# Patient Record
Sex: FEMALE | Race: WHITE | NOT HISPANIC OR LATINO | Employment: FULL TIME | URBAN - METROPOLITAN AREA
[De-identification: names, ages, dates, MRNs, and addresses within clinical notes are randomized per-mention and may not be internally consistent; named-entity substitution may affect disease eponyms.]

---

## 2017-01-31 ENCOUNTER — ALLSCRIPTS OFFICE VISIT (OUTPATIENT)
Dept: OTHER | Facility: OTHER | Age: 42
End: 2017-01-31

## 2017-10-06 ENCOUNTER — GENERIC CONVERSION - ENCOUNTER (OUTPATIENT)
Dept: OTHER | Facility: OTHER | Age: 42
End: 2017-10-06

## 2017-11-08 ENCOUNTER — GENERIC CONVERSION - ENCOUNTER (OUTPATIENT)
Dept: OTHER | Facility: OTHER | Age: 42
End: 2017-11-08

## 2017-11-10 ENCOUNTER — APPOINTMENT (OUTPATIENT)
Dept: LAB | Facility: MEDICAL CENTER | Age: 42
End: 2017-11-10
Attending: FAMILY MEDICINE

## 2017-11-10 ENCOUNTER — TRANSCRIBE ORDERS (OUTPATIENT)
Dept: URGENT CARE | Facility: MEDICAL CENTER | Age: 42
End: 2017-11-10

## 2017-11-10 ENCOUNTER — APPOINTMENT (OUTPATIENT)
Dept: URGENT CARE | Facility: MEDICAL CENTER | Age: 42
End: 2017-11-10

## 2017-11-10 DIAGNOSIS — Z02.1 PHYSICAL EXAM, PRE-EMPLOYMENT: ICD-10-CM

## 2017-11-10 DIAGNOSIS — Z02.1 PHYSICAL EXAM, PRE-EMPLOYMENT: Primary | ICD-10-CM

## 2017-11-10 PROCEDURE — 86762 RUBELLA ANTIBODY: CPT

## 2017-11-10 PROCEDURE — 36415 COLL VENOUS BLD VENIPUNCTURE: CPT

## 2017-11-10 PROCEDURE — 86480 TB TEST CELL IMMUN MEASURE: CPT

## 2017-11-10 PROCEDURE — 86765 RUBEOLA ANTIBODY: CPT

## 2017-11-10 PROCEDURE — 86735 MUMPS ANTIBODY: CPT

## 2017-11-10 PROCEDURE — 86787 VARICELLA-ZOSTER ANTIBODY: CPT

## 2017-11-11 LAB — RUBV IGG SERPL IA-ACNC: 73.2 IU/ML

## 2017-11-12 LAB
ANNOTATION COMMENT IMP: NORMAL
GAMMA INTERFERON BACKGROUND BLD IA-ACNC: 0.07 IU/ML
M TB IFN-G BLD-IMP: NEGATIVE
M TB IFN-G CD4+ BCKGRND COR BLD-ACNC: 0.01 IU/ML
M TB IFN-G CD4+ T-CELLS BLD-ACNC: 0.08 IU/ML
MITOGEN IGNF BLD-ACNC: >10 IU/ML
QUANTIFERON-TB GOLD IN TUBE: NORMAL
SERVICE CMNT-IMP: NORMAL

## 2017-11-14 LAB
MEV IGG SER QL: NORMAL
MUV IGG SER QL: NORMAL
VZV IGG SER IA-ACNC: NORMAL

## 2018-01-10 NOTE — PROGRESS NOTES
Assessment   1  Encounter for preventive health examination (V70 0) (Z00 00)  2  Never a smoker    Plan  Health Maintenance    · Start: Melatonin 10 MG Oral Capsule; uses 1 at bedtime  Migraine headache    · (1) COMPREHENSIVE METABOLIC PANEL; Status:Active; Requested for:[id number];    · *1 - [facility] Physician Referral  consult re migraines  Status: Active  Requested  for: [id number]  () Care Summary provided  : Yes    Discussion/Summary  health maintenance visit cervical cancer screening is current Breast cancer screening: mammogram is current  She has been feeling well since last here  She is up-to-date with her immunizations although declines flu shot today - reconsider getting (rcvd TDaP 2010)    - she does get headaches as before- no change in pattern - waqas perimenstrual - uses Imitrex which helps  Discussed using trial Topamax or Elavil BP may not support using CaChBlkr/ BBlkr - can get on list to see Neurology  After discussion - will use as is  --She was seen at urgent care back in November with bronchitis, if this recurs I feel she should try an inhaler such as Pro Air with chest tightness  Try to avoid antibiotics    -- uses Xanax sparingly - discussed- try to avoid    -- see gyn as is Continue w exercise Watch healthy diet    do fasting BW    Check here at least yearly  Chief Complaint  PHYSICAL      History of Present Illness  HPI: She is in today for regular check, she relates overall she has been feeling well  She does continue with migraine headaches routinely especially perimenstrual with the same pattern as before  She does use Imitrex with relief, averages approximately 4 pills a month  Excedrin also gives mild relief  She continues to exercise routinely, tries to watch a healthy diet      Does use Xanax on occasion sparingly for acute anxiety      Review of Systems    Constitutional: no fever, not feeling poorly, no recent weight gain, no chills, not feeling tired and no recent weight loss  Eyes: no eyesight problems  ENT: no complaints of earache, no loss of hearing, no nose bleeds, no nasal discharge, no sore throat, no hoarseness  Cardiovascular: No complaints of slow heart rate, no fast heart rate, no chest pain, no palpitations, no leg claudication, no lower extremity edema  Respiratory: No complaints of shortness of breath, no wheezing, no cough, no SOB on exertion, no orthopnea, no PND  Gastrointestinal: No complaints of abdominal pain, no constipation, no nausea or vomiting, no diarrhea, no bloody stools  Genitourinary: sees gyn, but no dysuria  Musculoskeletal: No complaints of arthralgias, no myalgias, no joint swelling or stiffness, no limb pain or swelling  Integumentary: No complaints of skin rash or lesions, no itching, no skin wounds, no breast pain or lump  Psychiatric: Not suicidal, no sleep disturbance, no anxiety or depression, no change in personality, no emotional problems  Endocrine: No complaints of proptosis, no hot flashes, no muscle weakness, no deepening of the voice, no feelings of weakness  Hematologic/Lymphatic: No complaints of swollen glands, no swollen glands in the neck, does not bleed easily, does not bruise easily  Active Problems   1  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 (795 01) (R87 610)  2  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)  3  Migraine headache (346 90) (G43 909)  4   Situational anxiety (300 09) (F41 8)    Past Medical History    · History of hypercholesterolemia (V12 29) (Z86 39)    Surgical History    · History of Elbow Arthroplasty   · History of Primary Repair Of Ruptured Achilles Tendon    Family History  Mother    · Family history of Pure Hypercholesterolemia  Father    · Family history of Aortic Aneurysm   · Family history of Osteoarthritis (V17 7)  Paternal Grandfather    · Family history of Cancer   · Family history of Parkinson Disease    Social History    · Alcohol Use (History)   · socially beer and wine   · Full-time employment   · Never a smoker   ·  (V61 03) (Z63 5)    Current Meds  1  ALPRAZolam 0 25 MG Oral Tablet; TAKE 1/2 to 1 TABLET  2 or 3 times a day as need for   anxiety -try to avoid; Therapy: [id number] to (Evaluate:[id number]); Last Rx:10Aug2016 Ordered  2  SUMAtriptan Succinate 50 MG Oral Tablet; TAKE 1 TABLET FOR MIGRAINE RELIEF    MAY REPEAT ONCE  in 2 HOURS  Requested for: 13Sep2016; Last Rx:13Sep2016   Ordered    Allergies   1  No Known Drug Allergies    Vitals   Recorded: 95RIN0224 02:35PM   Heart Rate 68   Systolic 022   Diastolic 70   Height 5 ft 0 6 in   Weight 119 lb    BMI Calculated 22 78   BSA Calculated 1 51     Physical Exam    Constitutional   General appearance: No acute distress, well appearing and well nourished  Head and Face   Head and face: Normal     Eyes   Conjunctiva and lids: No swelling, erythema or discharge  Ears, Nose, Mouth, and Throat   Otoscopic examination: Tympanic membranes translucent with normal light reflex  Canals patent without erythema  Lips, teeth, and gums: Normal, good dentition  Oropharynx: Normal with no erythema, edema, exudate or lesions  Neck   Thyroid: Normal, no thyromegaly  Pulmonary   Auscultation of lungs: Clear to auscultation  Cardiovascular   Auscultation of heart: Normal rate and rhythm, normal S1 and S2, no murmurs  Examination of extremities for edema and/or varicosities: Normal     Lymphatic   Palpation of lymph nodes in neck: No lymphadenopathy  Neurologic   Cortical function: Normal mental status  Coordination: Normal finger to nose and heel to shin  Psychiatric   Judgment and insight: Normal     Orientation to person, place, and time: Normal     Recent and remote memory: Intact      Mood and affect: Normal        Signatures   Electronically signed by : Misty Kanner, DO; Dec 16 2016  4:42PM EST                       (Author)

## 2018-01-13 VITALS
TEMPERATURE: 98.6 F | SYSTOLIC BLOOD PRESSURE: 116 MMHG | BODY MASS INDEX: 22.7 KG/M2 | HEIGHT: 61 IN | WEIGHT: 120.25 LBS | DIASTOLIC BLOOD PRESSURE: 62 MMHG

## 2018-01-13 NOTE — RESULT NOTES
Verified Results  (1) COMPREHENSIVE METABOLIC PANEL 22TIM7575 70:71IJ Gina Machado Order Number: [id number]     Test Name Result Flag Reference   GLUCOSE,RANDM 90 mg/dL     If the patient is fasting, the ADA then defines impaired fasting glucose as > 100 mg/dL and diabetes as > or equal to 123 mg/dL  SODIUM 140 mmol/L  136-145   POTASSIUM 4 4 mmol/L  3 5-5 3   CHLORIDE 106 mmol/L  100-108   CARBON DIOXIDE 29 mmol/L  21-32   ANION GAP (CALC) 5 mmol/L  4-13   BLOOD UREA NITROGEN 11 mg/dL  5-25   CREATININE 0 85 mg/dL  0 60-1 30   Standardized to IDMS reference method   CALCIUM 8 9 mg/dL  8 3-10 1   BILI, TOTAL 0 56 mg/dL  0 20-1 00   ALK PHOSPHATAS 40 U/L L    ALT (SGPT) 16 U/L  12-78   AST(SGOT) 12 U/L  5-45   ALBUMIN 3 9 g/dL  3 5-5 0   TOTAL PROTEIN 7 5 g/dL  6 4-8 2   eGFR Non-African American      >60 0 ml/min/1 73sq [facility] Disease Education Program recommendations are as follows:  GFR calculation is accurate only with a steady state creatinine  Chronic Kidney disease less than 60 ml/min/1 73 sq  meters  Kidney failure less than 15 ml/min/1 73 sq  meters

## 2018-01-22 VITALS
RESPIRATION RATE: 12 BRPM | WEIGHT: 123.31 LBS | HEIGHT: 61 IN | BODY MASS INDEX: 23.28 KG/M2 | SYSTOLIC BLOOD PRESSURE: 111 MMHG | DIASTOLIC BLOOD PRESSURE: 64 MMHG | HEART RATE: 57 BPM

## 2018-02-02 ENCOUNTER — OFFICE VISIT (OUTPATIENT)
Dept: FAMILY MEDICINE CLINIC | Facility: CLINIC | Age: 43
End: 2018-02-02
Payer: COMMERCIAL

## 2018-02-02 VITALS
TEMPERATURE: 98.5 F | DIASTOLIC BLOOD PRESSURE: 74 MMHG | WEIGHT: 122.6 LBS | HEIGHT: 61 IN | SYSTOLIC BLOOD PRESSURE: 106 MMHG | HEART RATE: 74 BPM | BODY MASS INDEX: 23.15 KG/M2

## 2018-02-02 DIAGNOSIS — F41.9 ANXIETY: Primary | ICD-10-CM

## 2018-02-02 DIAGNOSIS — L72.3 SEBACEOUS CYST: ICD-10-CM

## 2018-02-02 PROCEDURE — 99213 OFFICE O/P EST LOW 20 MIN: CPT | Performed by: FAMILY MEDICINE

## 2018-02-02 RX ORDER — ALBUTEROL SULFATE 90 UG/1
1-2 AEROSOL, METERED RESPIRATORY (INHALATION)
COMMUNITY
Start: 2017-01-31 | End: 2018-02-02 | Stop reason: ALTCHOICE

## 2018-02-02 RX ORDER — ALPRAZOLAM 0.25 MG/1
TABLET ORAL
COMMUNITY
Start: 2014-07-11 | End: 2018-02-02 | Stop reason: SDUPTHER

## 2018-02-02 RX ORDER — ALPRAZOLAM 0.25 MG/1
TABLET ORAL
Qty: 20 TABLET | Refills: 0 | Status: SHIPPED | OUTPATIENT
Start: 2018-02-02 | End: 2018-09-14 | Stop reason: SDUPTHER

## 2018-02-02 RX ORDER — SUMATRIPTAN 50 MG/1
1 TABLET, FILM COATED ORAL
COMMUNITY
End: 2018-05-10 | Stop reason: SDUPTHER

## 2018-02-02 NOTE — PROGRESS NOTES
FAMILY PRACTICE OFFICE VISIT       NAME: Álvaro Noriega  AGE: 36 y o  SEX: female       : 1975        MRN: [id number]    DATE: 2018  TIME: 10:06 AM    Assessment and Plan     Problem List Items Addressed This Visit     None      Visit Diagnoses     Anxiety    -  Primary    Relevant Medications    ALPRAZolam (XANAX) 0 25 mg tablet    Sebaceous cyst                Patient Instructions    We discussed sebaceous cyst right suboccipital / posterior neck  With small size and minimal inflammation today I would not do incision and drainage, just observe, if this would enlarge with fluctuance we can do I and D, she also does see Dermatology on occasion, can discuss this with them     She uses Xanax very sparingly , 1/2 pill every few weeks, I did refill 20 pills for her, previously received 20 pills in September  Also - continue to see Neurology as is regarding headaches          Chief Complaint     Chief Complaint   Patient presents with    Cyst     back of the neck more inflamed in the last week or so        History of Present Illness   Roxanne Stoner is a 36y o -year-old female who  Has noted about 1 week of inflamed cyst sub acceptable on the right, she did express thick white material from it, recurred  Cyst has been present for quite some time but enlarged, now back down  Otherwise she has been feeling well, she is seeing Neurology regarding her headaches  She does use Xanax very sparingly, 1/2 pill every few weeks  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     Review of Systems   Review of Systems   Constitutional: Negative for appetite change, chills, diaphoresis, fatigue, fever and unexpected weight change  HENT: Negative for congestion, ear pain, hearing loss, mouth sores, postnasal drip, sinus pain, sinus pressure, sore throat and voice change  Eyes: Negative for pain and visual disturbance     Respiratory: Negative for apnea, cough, chest tightness, shortness of breath and wheezing (Had bronchitis last year, used rescue inhaler few times, no need since, no history of asthma)  Cardiovascular: Negative for chest pain, palpitations and leg swelling  Gastrointestinal: Negative for abdominal distention, abdominal pain, blood in stool, constipation, diarrhea, nausea and vomiting  Endocrine: Negative for polydipsia and polyuria  Genitourinary: Negative for difficulty urinating, dysuria and hematuria  Musculoskeletal: Negative for arthralgias, joint swelling and myalgias  Skin: Negative for rash and wound  Neurological: Negative for dizziness, tremors, seizures, syncope, speech difficulty, weakness, light-headedness and headaches (Sees Neurology)  Hematological: Negative for adenopathy  Does not bruise/bleed easily  Psychiatric/Behavioral: Negative for behavioral problems, confusion and sleep disturbance         Active Problem List     Patient Active Problem List   Diagnosis    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    HPV in female    Menstrual migraine    Migraine headache    Situational anxiety       Past Medical History:  Past Medical History:   Diagnosis Date    Hypercholesterolemia     200/75/119       Past Surgical History:  Past Surgical History:   Procedure Laterality Date    ACHILLES TENDON REPAIR Left     Repair of Ruptured achilles tendon left    ELBOW ARTHROPLASTY Left        Family History:  Family History   Problem Relation Age of Onset    Hyperlipidemia Mother      Pure hypocholesterolemia    Aortic aneurysm Father     Hypertension Father     Osteoarthritis Father     Cancer Paternal Grandfather     Parkinsonism Paternal Grandfather        Social History:  Social History     Social History    Marital status: /Civil Union     Spouse name: N/A    Number of children: 3    Years of education: N/A     Occupational History    Full-time employment      Social History Main Topics  Smoking status: Never Smoker    Smokeless tobacco: Not on file    Alcohol use Yes      Comment: socially beer and wine    Drug use: No    Sexual activity: Not on file     Other Topics Concern    Not on file     Social History Narrative    Caffeine use               Objective     Vitals:    02/02/18 0942   BP: 106/74   Pulse: 74   Temp: 98 5 °F (36 9 °C)   Weight: 55 6 kg (122 lb 9 6 oz)   Height: 5' 0 6" (1 539 m)     Wt Readings from Last 3 Encounters:   02/02/18 55 6 kg (122 lb 9 6 oz)   11/08/17 55 9 kg (123 lb 4 9 oz)   01/31/17 54 5 kg (120 lb 4 oz)       Physical Exam   Constitutional: She appears well-developed and well-nourished  Neck: Normal range of motion  Neck supple  No thyromegaly present  Has small cystic minimally tender mass right suboccipital, no fluctuance  No redness   Lymphadenopathy:     She has no cervical adenopathy  Pertinent Laboratory/Diagnostic Studies:          ALLERGIES:  No Known Allergies    Current Medications     Current Outpatient Prescriptions   Medication Sig Dispense Refill    ALPRAZolam (XANAX) 0 25 mg tablet Use 1/2 pill daily as needed, use sparingly 20 tablet 0    SUMAtriptan (IMITREX) 50 mg tablet Take 1 tablet by mouth       No current facility-administered medications for this visit            Health Maintenance       Edwina Dasilva DO

## 2018-05-10 DIAGNOSIS — G43.709 CHRONIC MIGRAINE WITHOUT AURA WITHOUT STATUS MIGRAINOSUS, NOT INTRACTABLE: Primary | ICD-10-CM

## 2018-05-11 RX ORDER — SUMATRIPTAN 50 MG/1
50 TABLET, FILM COATED ORAL ONCE AS NEEDED
Qty: 9 TABLET | Refills: 0 | Status: SHIPPED | OUTPATIENT
Start: 2018-05-11 | End: 2018-07-27 | Stop reason: SDUPTHER

## 2018-06-06 ENCOUNTER — OFFICE VISIT (OUTPATIENT)
Dept: FAMILY MEDICINE CLINIC | Facility: CLINIC | Age: 43
End: 2018-06-06
Payer: COMMERCIAL

## 2018-06-06 VITALS
WEIGHT: 121.5 LBS | HEIGHT: 61 IN | BODY MASS INDEX: 22.94 KG/M2 | TEMPERATURE: 98 F | OXYGEN SATURATION: 99 % | DIASTOLIC BLOOD PRESSURE: 70 MMHG | HEART RATE: 68 BPM | SYSTOLIC BLOOD PRESSURE: 98 MMHG

## 2018-06-06 DIAGNOSIS — J06.9 VIRAL UPPER RESPIRATORY TRACT INFECTION: Primary | ICD-10-CM

## 2018-06-06 PROCEDURE — 99213 OFFICE O/P EST LOW 20 MIN: CPT | Performed by: FAMILY MEDICINE

## 2018-06-06 RX ORDER — ALBUTEROL SULFATE 90 UG/1
2 AEROSOL, METERED RESPIRATORY (INHALATION) EVERY 6 HOURS PRN
Qty: 1 INHALER | Refills: 0 | Status: SHIPPED | OUTPATIENT
Start: 2018-06-06 | End: 2019-10-04 | Stop reason: SDUPTHER

## 2018-06-06 NOTE — PROGRESS NOTES
Assessment/Plan:     Diagnoses and all orders for this visit:    Viral upper respiratory tract infection  -     albuterol (PROVENTIL HFA,VENTOLIN HFA) 90 mcg/act inhaler; Inhale 2 puffs every 6 (six) hours as needed for wheezing          Subjective:      Patient ID: Jeovanny Pool is a 36 y o  female  Cold sx began 5 days ago  She had eye drainage right side now left  Also laryngitis  Cough and chest tightness at night  She had inhaler in past which helped      URI    This is a new problem  The current episode started in the past 7 days  There has been no fever  Associated symptoms include congestion, coughing and a sore throat  The following portions of the patient's history were reviewed and updated as appropriate: allergies, current medications, past family history, past medical history, past social history, past surgical history and problem list     Review of Systems   Constitutional: Positive for fatigue  Negative for fever  HENT: Positive for congestion and sore throat  Respiratory: Positive for cough  Objective:      BP 98/70   Pulse 68   Temp 98 °F (36 7 °C)   Ht 5' 0 6" (1 539 m)   Wt 55 1 kg (121 lb 8 oz)   SpO2 99%   BMI 23 26 kg/m²          Physical Exam   Constitutional: She appears well-developed and well-nourished  HENT:   Head: Normocephalic and atraumatic  Right Ear: External ear normal    Left Ear: External ear normal    Nose congested   Eyes: Conjunctivae are normal  Pupils are equal, round, and reactive to light  Mild conj injection right eye   Neck: Normal range of motion  Neck supple  No thyromegaly present  Cardiovascular: Normal rate, regular rhythm and normal heart sounds  Pulmonary/Chest: Effort normal and breath sounds normal    Lymphadenopathy:     She has no cervical adenopathy  Skin: Skin is warm and dry  Psychiatric: She has a normal mood and affect  Her behavior is normal    Nursing note and vitals reviewed

## 2018-06-06 NOTE — LETTER
June 6, 2018     Patient: Olga Lidia Pradhan   YOB: 1975   Date of Visit: 6/6/2018       To Whom it May Concern:    Nano Winters is under my professional care  She was seen in my office on 6/6/2018  She may return to work on 06/11/2018  Patient has been out of work since 6/4/18    If you have any questions or concerns, please don't hesitate to call           Sincerely,          Marielos Boss MD        CC: No Recipients

## 2018-06-09 ENCOUNTER — OFFICE VISIT (OUTPATIENT)
Dept: URGENT CARE | Age: 43
End: 2018-06-09
Payer: COMMERCIAL

## 2018-06-09 VITALS
SYSTOLIC BLOOD PRESSURE: 127 MMHG | HEIGHT: 62 IN | BODY MASS INDEX: 22.08 KG/M2 | RESPIRATION RATE: 16 BRPM | WEIGHT: 120 LBS | DIASTOLIC BLOOD PRESSURE: 76 MMHG | OXYGEN SATURATION: 100 % | HEART RATE: 56 BPM | TEMPERATURE: 97.5 F

## 2018-06-09 DIAGNOSIS — J01.90 ACUTE NON-RECURRENT SINUSITIS, UNSPECIFIED LOCATION: Primary | ICD-10-CM

## 2018-06-09 PROCEDURE — 99213 OFFICE O/P EST LOW 20 MIN: CPT | Performed by: PHYSICIAN ASSISTANT

## 2018-06-09 PROCEDURE — S9088 SERVICES PROVIDED IN URGENT: HCPCS | Performed by: PHYSICIAN ASSISTANT

## 2018-06-09 RX ORDER — BENZONATATE 100 MG/1
100 CAPSULE ORAL 3 TIMES DAILY PRN
Qty: 15 CAPSULE | Refills: 0 | Status: SHIPPED | OUTPATIENT
Start: 2018-06-09 | End: 2018-09-14 | Stop reason: ALTCHOICE

## 2018-06-09 RX ORDER — LORATADINE 10 MG/1
10 TABLET ORAL DAILY
Qty: 30 TABLET | Refills: 0 | Status: SHIPPED | OUTPATIENT
Start: 2018-06-09 | End: 2018-12-28

## 2018-06-09 RX ORDER — AMOXICILLIN AND CLAVULANATE POTASSIUM 875; 125 MG/1; MG/1
1 TABLET, FILM COATED ORAL EVERY 12 HOURS SCHEDULED
Qty: 14 TABLET | Refills: 0 | Status: SHIPPED | OUTPATIENT
Start: 2018-06-09 | End: 2018-06-16

## 2018-06-09 RX ORDER — FLUTICASONE PROPIONATE 50 MCG
2 SPRAY, SUSPENSION (ML) NASAL DAILY
Qty: 16 G | Refills: 0 | Status: SHIPPED | OUTPATIENT
Start: 2018-06-09 | End: 2019-03-01 | Stop reason: SDUPTHER

## 2018-06-09 NOTE — PATIENT INSTRUCTIONS
Follow up with PCP in 3-5 days  Proceed to  ER if symptoms worsen  Sinusitis   AMBULATORY CARE:   Sinusitis  is inflammation or infection of your sinuses  It is most often caused by a virus  Acute sinusitis may last up to 12 weeks  Chronic sinusitis lasts longer than 12 weeks  Recurrent sinusitis means you have 4 or more times in 1 year  Common symptoms include the following:   · Fever    · Pain, pressure, redness, or swelling around the forehead, cheeks, or eyes    · Thick yellow or green discharge from your nose    · Tenderness when you touch your face over your sinuses    · Dry cough that happens mostly at night or when you lie down    · Headache and face pain that is worse when you lean forward    · Tooth pain, or pain when you chew  Seek care immediately if:   · Your eye and eyelid are red, swollen, and painful  · You cannot open your eye  · You have vision changes, such as double vision  · Your eyeball bulges out or you cannot move your eye  · You are more sleepy than normal, or you notice changes in your ability to think, move, or talk  · You have a stiff neck, a fever, or a bad headache  · You have swelling of your forehead or scalp  Contact your healthcare provider if:   · Your symptoms do not improve after 3 days  · Your symptoms do not go away after 10 days  · You have nausea and are vomiting  · Your nose is bleeding  · You have questions or concerns about your condition or care  Treatment for sinusitis:  Your symptoms may go away on their own  Your healthcare provider may recommend watchful waiting for up to 10 days before starting antibiotics  You may  need any of the following:  · Acetaminophen  decreases pain and fever  It is available without a doctor's order  Ask how much to take and how often to take it  Follow directions   Read the labels of all other medicines you are using to see if they also contain acetaminophen, or ask your doctor or pharmacist  Acetaminophen can cause liver damage if not taken correctly  Do not use more than 4 grams (4,000 milligrams) total of acetaminophen in one day  · NSAIDs , such as ibuprofen, help decrease swelling, pain, and fever  This medicine is available with or without a doctor's order  NSAIDs can cause stomach bleeding or kidney problems in certain people  If you take blood thinner medicine, always ask your healthcare provider if NSAIDs are safe for you  Always read the medicine label and follow directions  · Nasal steroid sprays  may help decrease inflammation in your nose and sinuses  · Decongestants  help reduce swelling and drain mucus in the nose and sinuses  They may help you breathe easier  · Antihistamines  help dry mucus in the nose and relieve sneezing  · Antibiotics  help treat or prevent a bacterial infection  · Take your medicine as directed  Contact your healthcare provider if you think your medicine is not helping or if you have side effects  Tell him or her if you are allergic to any medicine  Keep a list of the medicines, vitamins, and herbs you take  Include the amounts, and when and why you take them  Bring the list or the pill bottles to follow-up visits  Carry your medicine list with you in case of an emergency  Self-care:   · Rinse your sinuses  Use a sinus rinse device to rinse your nasal passages with a saline (salt water) solution or distilled water  Do not use tap water  This will help thin the mucus in your nose and rinse away pollen and dirt  It will also help reduce swelling so you can breathe normally  Ask your healthcare provider how often to do this  · Breathe in steam   Heat a bowl of water until you see steam  Lean over the bowl and make a tent over your head with a large towel  Breathe deeply for about 20 minutes  Be careful not to get too close to the steam or burn yourself  Do this 3 times a day  You can also breathe deeply when you take a hot shower       · Sleep with your head elevated  Place an extra pillow under your head before you go to sleep to help your sinuses drain  · Drink liquids as directed  Ask your healthcare provider how much liquid to drink each day and which liquids are best for you  Liquids will thin the mucus in your nose and help it drain  Avoid drinks that contain alcohol or caffeine  · Do not smoke, and avoid secondhand smoke  Nicotine and other chemicals in cigarettes and cigars can make your symptoms worse  Ask your healthcare provider for information if you currently smoke and need help to quit  E-cigarettes or smokeless tobacco still contain nicotine  Talk to your healthcare provider before you use these products  Prevent the spread of germs that cause sinusitis:  Wash your hands often with soap and water  Wash your hands after you use the bathroom, change a child's diaper, or sneeze  Wash your hands before you prepare or eat food  Follow up with your healthcare provider as directed: You may be referred to an ear, nose, and throat specialist  Write down your questions so you remember to ask them during your visits  © 2017 [facility] Information is for End User's use only and may not be sold, redistributed or otherwise used for commercial purposes  All illustrations and images included in CareNotes® are the copyrighted property of A D [employer]  or [employer]  The above information is an  only  It is not intended as medical advice for individual conditions or treatments  Talk to your doctor, nurse or pharmacist before following any medical regimen to see if it is safe and effective for you

## 2018-06-09 NOTE — PROGRESS NOTES
[address]        NAME: Moshe Jones is a 36 y o  female  : 1975    MRN: [id number]  DATE: 2018  TIME: 10:41 AM    Assessment and Plan   Acute non-recurrent sinusitis, unspecified location [J01 90]  1  Acute non-recurrent sinusitis, unspecified location  amoxicillin-clavulanate (AUGMENTIN) 875-125 mg per tablet    fluticasone (FLONASE) 50 mcg/act nasal spray    benzonatate (TESSALON PERLES) 100 mg capsule    loratadine (CLARITIN) 10 mg tablet         Patient Instructions       Follow up with PCP in 3-5 days  Proceed to  ER if symptoms worsen  Chief Complaint     Chief Complaint   Patient presents with    Facial Pain     for 8 day with cough and chest congestion  would like something for cough; keeping her awake         History of Present Illness       For the past 8 days sinus congestion, cough, chest congestion  Been using OTC medications without the relief      URI    This is a new problem  The current episode started 1 to 4 weeks ago  The problem has been waxing and waning  There has been no fever  Associated symptoms include congestion, coughing, rhinorrhea, sinus pain and a sore throat  Pertinent negatives include no abdominal pain, chest pain, diarrhea, dysuria, ear pain, joint pain, joint swelling, neck pain, swollen glands, vomiting or wheezing  She has tried nothing for the symptoms  Review of Systems   Review of Systems   Constitutional: Negative  HENT: Positive for congestion, rhinorrhea, sinus pain and sore throat  Negative for ear pain  Eyes: Negative  Respiratory: Positive for cough  Negative for wheezing  Cardiovascular: Negative  Negative for chest pain  Gastrointestinal: Negative  Negative for abdominal pain, diarrhea and vomiting  Genitourinary: Negative for dysuria  Musculoskeletal: Negative  Negative for joint pain and neck pain           Current Medications       Current Outpatient Prescriptions:     albuterol (PROVENTIL HFA,VENTOLIN HFA) 90 mcg/act inhaler, Inhale 2 puffs every 6 (six) hours as needed for wheezing, Disp: 1 Inhaler, Rfl: 0    ALPRAZolam (XANAX) 0 25 mg tablet, Use 1/2 pill daily as needed, use sparingly, Disp: 20 tablet, Rfl: 0    SUMAtriptan (IMITREX) 50 mg tablet, Take 1 tablet (50 mg total) by mouth once as needed for migraine for up to 1 dose, Disp: 9 tablet, Rfl: 0    amoxicillin-clavulanate (AUGMENTIN) 875-125 mg per tablet, Take 1 tablet by mouth every 12 (twelve) hours for 7 days, Disp: 14 tablet, Rfl: 0    benzonatate (TESSALON PERLES) 100 mg capsule, Take 1 capsule (100 mg total) by mouth 3 (three) times a day as needed for cough, Disp: 15 capsule, Rfl: 0    fluticasone (FLONASE) 50 mcg/act nasal spray, 2 sprays into each nostril daily, Disp: 16 g, Rfl: 0    loratadine (CLARITIN) 10 mg tablet, Take 1 tablet (10 mg total) by mouth daily, Disp: 30 tablet, Rfl: 0    Current Allergies     Allergies as of 06/09/2018    (No Known Allergies)            The following portions of the patient's history were reviewed and updated as appropriate: allergies, current medications, past family history, past medical history, past social history, past surgical history and problem list      Past Medical History:   Diagnosis Date    Hypercholesterolemia     200/75/119       Past Surgical History:   Procedure Laterality Date    ACHILLES TENDON REPAIR Left     Repair of Ruptured achilles tendon left    ELBOW ARTHROPLASTY Left        Family History   Problem Relation Age of Onset    Hyperlipidemia Mother      Pure hypocholesterolemia    Aortic aneurysm Father     Hypertension Father     Osteoarthritis Father     Cancer Paternal Grandfather     Parkinsonism Paternal Grandfather          Medications have been verified          Objective   /76   Pulse 56   Temp 97 5 °F (36 4 °C)   Resp 16   Ht 5' 2" (1 575 m)   Wt 54 4 kg (120 lb)   SpO2 100%   BMI 21 95 kg/m²        Physical Exam     Physical Exam Constitutional: She is oriented to person, place, and time  She appears well-developed and well-nourished  No distress  HENT:   Head: Normocephalic and atraumatic  Right Ear: External ear normal    Left Ear: External ear normal    Nose: Right sinus exhibits maxillary sinus tenderness and frontal sinus tenderness  Left sinus exhibits maxillary sinus tenderness and frontal sinus tenderness  Mouth/Throat: Oropharynx is clear and moist  No oropharyngeal exudate  Eyes: Conjunctivae are normal    Neck: Normal range of motion  Neck supple  Cardiovascular: Normal rate, regular rhythm, normal heart sounds and intact distal pulses  No murmur heard  Pulmonary/Chest: Effort normal and breath sounds normal  No respiratory distress  She has no decreased breath sounds  She has no wheezes  She has no rhonchi  She has no rales  Abdominal: Soft  Bowel sounds are normal  There is no tenderness  Musculoskeletal: Normal range of motion  Lymphadenopathy:     She has no cervical adenopathy  Neurological: She is alert and oriented to person, place, and time  Skin: Skin is warm and dry  Psychiatric: She has a normal mood and affect  Nursing note and vitals reviewed

## 2018-06-12 ENCOUNTER — TELEPHONE (OUTPATIENT)
Dept: FAMILY MEDICINE CLINIC | Facility: CLINIC | Age: 43
End: 2018-06-12

## 2018-06-12 DIAGNOSIS — R05.9 COUGH: Primary | ICD-10-CM

## 2018-06-12 RX ORDER — BENZONATATE 100 MG/1
100 CAPSULE ORAL 3 TIMES DAILY PRN
Qty: 30 CAPSULE | Refills: 0 | Status: SHIPPED | OUTPATIENT
Start: 2018-06-12 | End: 2018-09-14 | Stop reason: ALTCHOICE

## 2018-06-12 NOTE — TELEPHONE ENCOUNTER
Pt called stating that she went to urgent care over the weekend because her URI was not getting any better  Pt was given tessalon perles and will be going out of town this week  Pt would like to know if we can send a refill for her to last her through the weekend

## 2018-07-27 DIAGNOSIS — G43.709 CHRONIC MIGRAINE WITHOUT AURA WITHOUT STATUS MIGRAINOSUS, NOT INTRACTABLE: ICD-10-CM

## 2018-07-27 RX ORDER — SUMATRIPTAN 50 MG/1
50 TABLET, FILM COATED ORAL ONCE AS NEEDED
Qty: 9 TABLET | Refills: 0 | Status: SHIPPED | OUTPATIENT
Start: 2018-07-27 | End: 2018-09-14 | Stop reason: SDUPTHER

## 2018-08-03 ENCOUNTER — APPOINTMENT (OUTPATIENT)
Dept: LAB | Facility: CLINIC | Age: 43
End: 2018-08-03

## 2018-08-03 ENCOUNTER — TRANSCRIBE ORDERS (OUTPATIENT)
Dept: LAB | Facility: CLINIC | Age: 43
End: 2018-08-03

## 2018-08-03 DIAGNOSIS — Z00.8 HEALTH EXAMINATION IN POPULATION SURVEY: ICD-10-CM

## 2018-08-03 DIAGNOSIS — Z00.8 HEALTH EXAMINATION IN POPULATION SURVEY: Primary | ICD-10-CM

## 2018-08-03 LAB
CHOLEST SERPL-MCNC: 179 MG/DL (ref 50–200)
EST. AVERAGE GLUCOSE BLD GHB EST-MCNC: 114 MG/DL
HBA1C MFR BLD: 5.6 % (ref 4.2–6.3)
HDLC SERPL-MCNC: 87 MG/DL (ref 40–60)
LDLC SERPL CALC-MCNC: 87 MG/DL (ref 0–100)
NONHDLC SERPL-MCNC: 92 MG/DL
TRIGL SERPL-MCNC: 27 MG/DL

## 2018-08-03 PROCEDURE — 80061 LIPID PANEL: CPT

## 2018-08-03 PROCEDURE — 83036 HEMOGLOBIN GLYCOSYLATED A1C: CPT

## 2018-08-03 PROCEDURE — 36415 COLL VENOUS BLD VENIPUNCTURE: CPT

## 2018-09-13 PROCEDURE — 88305 TISSUE EXAM BY PATHOLOGIST: CPT | Performed by: PATHOLOGY

## 2018-09-14 ENCOUNTER — OFFICE VISIT (OUTPATIENT)
Dept: INTERNAL MEDICINE CLINIC | Facility: CLINIC | Age: 43
End: 2018-09-14
Payer: COMMERCIAL

## 2018-09-14 VITALS
DIASTOLIC BLOOD PRESSURE: 64 MMHG | OXYGEN SATURATION: 98 % | RESPIRATION RATE: 18 BRPM | WEIGHT: 120.9 LBS | SYSTOLIC BLOOD PRESSURE: 110 MMHG | HEIGHT: 62 IN | HEART RATE: 57 BPM | BODY MASS INDEX: 22.25 KG/M2 | TEMPERATURE: 97.9 F

## 2018-09-14 DIAGNOSIS — Z00.00 HEALTH MAINTENANCE EXAMINATION: ICD-10-CM

## 2018-09-14 DIAGNOSIS — J30.89 ALLERGIC RHINITIS DUE TO OTHER ALLERGIC TRIGGER, UNSPECIFIED SEASONALITY: Primary | ICD-10-CM

## 2018-09-14 DIAGNOSIS — Z12.31 ENCOUNTER FOR SCREENING MAMMOGRAM FOR BREAST CANCER: ICD-10-CM

## 2018-09-14 DIAGNOSIS — R73.03 PREDIABETES: ICD-10-CM

## 2018-09-14 DIAGNOSIS — G43.709 CHRONIC MIGRAINE WITHOUT AURA WITHOUT STATUS MIGRAINOSUS, NOT INTRACTABLE: ICD-10-CM

## 2018-09-14 DIAGNOSIS — F41.9 ANXIETY: ICD-10-CM

## 2018-09-14 DIAGNOSIS — Z71.9 HEALTH COUNSELING: ICD-10-CM

## 2018-09-14 DIAGNOSIS — F41.8 SITUATIONAL ANXIETY: ICD-10-CM

## 2018-09-14 PROBLEM — J30.9 ALLERGIC RHINITIS: Status: ACTIVE | Noted: 2018-09-14

## 2018-09-14 PROCEDURE — 1036F TOBACCO NON-USER: CPT | Performed by: INTERNAL MEDICINE

## 2018-09-14 PROCEDURE — 3008F BODY MASS INDEX DOCD: CPT | Performed by: INTERNAL MEDICINE

## 2018-09-14 PROCEDURE — 99214 OFFICE O/P EST MOD 30 MIN: CPT | Performed by: INTERNAL MEDICINE

## 2018-09-14 RX ORDER — SUMATRIPTAN 50 MG/1
TABLET, FILM COATED ORAL
Qty: 9 TABLET | Refills: 1 | Status: SHIPPED | OUTPATIENT
Start: 2018-09-14 | End: 2019-03-01 | Stop reason: SDUPTHER

## 2018-09-14 RX ORDER — ALPRAZOLAM 0.25 MG/1
TABLET ORAL
Qty: 20 TABLET | Refills: 0 | Status: SHIPPED | OUTPATIENT
Start: 2018-09-14 | End: 2019-05-31 | Stop reason: SDUPTHER

## 2018-09-14 NOTE — PROGRESS NOTES
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  PAPs due, refer to gynecology  Situational anxiety  Takes alprazolam rarely, as needed  PDMP reviewed  Prediabetes  Discussed low carb diet, limit junk food, sweet  Diagnoses and all orders for this visit:    Allergic rhinitis due to other allergic trigger, unspecified seasonality    Chronic migraine without aura without status migrainosus, not intractable  -     SUMAtriptan (IMITREX) 50 mg tablet; Take 1/2 to 1 tablet as needed for headache    Anxiety  -     ALPRAZolam (XANAX) 0 25 mg tablet; Use 1/2 pill daily as needed, use sparingly    Health maintenance examination  -     Ambulatory referral to Obstetrics / Gynecology; Future    Prediabetes    Situational anxiety    Health counseling  Comments:  Mammogram and PAPs due  Flu vaccine at work  Encounter for screening mammogram for breast cancer  -     Mammo screening bilateral w cad; Future      Follow up in 1 year or as needed  Subjective:      Patient ID: Cathleen Barraza is a 36 y o  female  Moris Kajla complains of frequent sinus pressure  She reports that symptoms worse recently, complains of sinus pain and pressure occasionally  She denies any postnasal drip, sore throat, cough or wheezing  She has Flonase which she does not use regularly  She is also worried about her health, took about a month for her to recover from a cold  She was given a steroid inhaler to use then  She suffers from migraine headaches, saw Neurology  She takes Imitrex as needed  She would take this about 4 to 8 times a month, 25 mg at a time    She does have a mouth guard which she does not use at night, she clenches her jaw frequently  She reports that if she does not eat or sleep well, this can trigger a headache  Migraine headaches would occur about 3 to 4 times a month  The following portions of the patient's history were reviewed and updated as appropriate: allergies, current medications, past family history, past medical history, past social history, past surgical history and problem list     Review of Systems   Constitutional: Negative for appetite change and fatigue  HENT: Positive for congestion, postnasal drip, rhinorrhea and sinus pressure  Negative for ear pain  Eyes: Negative for visual disturbance  Respiratory: Negative for cough and shortness of breath  Cardiovascular: Negative for chest pain and leg swelling  Gastrointestinal: Negative for abdominal pain, constipation and diarrhea  Genitourinary: Negative for dysuria, frequency and urgency  Musculoskeletal: Negative for arthralgias and myalgias  Skin: Negative for rash and wound  Neurological: Negative for dizziness, numbness and headaches  Hematological: Does not bruise/bleed easily  Psychiatric/Behavioral: Negative for confusion  The patient is not nervous/anxious  Objective:      /64   Pulse 57   Temp 97 9 °F (36 6 °C) (Oral)   Resp 18   Ht 5' 1 5" (1 562 m)   Wt 54 8 kg (120 lb 14 4 oz)   SpO2 98%   BMI 22 47 kg/m²          Physical Exam   Constitutional: She is oriented to person, place, and time  She appears well-developed and well-nourished  HENT:   Head: Normocephalic and atraumatic  Right Ear: Tympanic membrane, external ear and ear canal normal  No decreased hearing is noted  Left Ear: Tympanic membrane, external ear and ear canal normal  No decreased hearing is noted  Nose: Rhinorrhea present  No mucosal edema  Right sinus exhibits no maxillary sinus tenderness and no frontal sinus tenderness   Left sinus exhibits no maxillary sinus tenderness and no frontal sinus tenderness  Mouth/Throat: Oropharynx is clear and moist and mucous membranes are normal    Eyes: Conjunctivae are normal  Pupils are equal, round, and reactive to light  Neck: Neck supple  Cardiovascular: Normal rate, regular rhythm and normal heart sounds  No edema  Pulmonary/Chest: Effort normal and breath sounds normal  She has no wheezes  She has no rales  Abdominal: Soft  Bowel sounds are normal    Neurological: She is alert and oriented to person, place, and time  Skin: Skin is warm  No rash noted  Psychiatric: She has a normal mood and affect  Her behavior is normal    Nursing note and vitals reviewed  Reviewed available records

## 2018-09-14 NOTE — ASSESSMENT & PLAN NOTE
Takes Imitrex as needed  If sinus symptoms do not improve and headaches continue, recommend to keep appointment with neurology

## 2018-09-14 NOTE — PATIENT INSTRUCTIONS
Use steroid nasal spray every morning, saline spray as needed  Start saline rinse at least every other day  Limit use of decongestant  You can start taking magnesium and B2 supplements to help with migraine headaches

## 2018-09-14 NOTE — ASSESSMENT & PLAN NOTE
Start steroid nasal spray every morning, use saline spray as needed  Can start saline rinse at least every other day

## 2018-09-17 ENCOUNTER — LAB REQUISITION (OUTPATIENT)
Dept: LAB | Facility: HOSPITAL | Age: 43
End: 2018-09-17
Payer: COMMERCIAL

## 2018-09-17 DIAGNOSIS — D48.5 NEOPLASM OF UNCERTAIN BEHAVIOR OF SKIN: ICD-10-CM

## 2018-10-18 ENCOUNTER — TELEPHONE (OUTPATIENT)
Dept: NEUROLOGY | Facility: CLINIC | Age: 43
End: 2018-10-18

## 2018-12-28 ENCOUNTER — OFFICE VISIT (OUTPATIENT)
Dept: OBGYN CLINIC | Facility: CLINIC | Age: 43
End: 2018-12-28
Payer: COMMERCIAL

## 2018-12-28 VITALS
BODY MASS INDEX: 22.77 KG/M2 | WEIGHT: 120.6 LBS | DIASTOLIC BLOOD PRESSURE: 64 MMHG | HEIGHT: 61 IN | SYSTOLIC BLOOD PRESSURE: 108 MMHG

## 2018-12-28 DIAGNOSIS — Z12.4 CERVICAL CANCER SCREENING: ICD-10-CM

## 2018-12-28 DIAGNOSIS — Z01.419 WELL WOMAN EXAM WITH ROUTINE GYNECOLOGICAL EXAM: Primary | ICD-10-CM

## 2018-12-28 DIAGNOSIS — Z11.51 SCREENING FOR HPV (HUMAN PAPILLOMAVIRUS): ICD-10-CM

## 2018-12-28 DIAGNOSIS — Z00.00 HEALTH MAINTENANCE EXAMINATION: ICD-10-CM

## 2018-12-28 PROCEDURE — 87624 HPV HI-RISK TYP POOLED RSLT: CPT | Performed by: OBSTETRICS & GYNECOLOGY

## 2018-12-28 PROCEDURE — 99386 PREV VISIT NEW AGE 40-64: CPT | Performed by: OBSTETRICS & GYNECOLOGY

## 2018-12-28 PROCEDURE — G0145 SCR C/V CYTO,THINLAYER,RESCR: HCPCS | Performed by: OBSTETRICS & GYNECOLOGY

## 2018-12-28 NOTE — PROGRESS NOTES
ASSESSMENT & PLAN: Barak Bocanegra is a 39 y o  [id number] with normal gynecologic exam     1   Routine well woman exam done today  2   Pap and HPV:Pap with HPV was done today  Current ASCCP Guidelines reviewed  3   Mammogram ordered  Recommend yearly mammography  4   The patient declined STD testing  No testing performed  Safe sex practices have been discussed  5  The patient is sexually active  She relies on her partners vasectomy contraception and options have been discussed  6  The following were reviewed in today's visit: breast self exam, mammography screening ordered, exercise, healthy diet and HPV  7  Patient to return to office in 12 months for annual exam      All questions have been answered to her satisfaction  CC:  Annual Gynecologic Examination    HPI: Barak Bocanegra is a 39 y o  [id number] who presents for annual gynecologic examination  She has the following concerns:  No concerns    Health Maintenance:    She exercises 3 days per week  She wears her seatbelt routinely  She does not perform regular monthly self breast exams  She feels safe at home  Patients does try to follow a balanced diet  Past Medical History:   Diagnosis Date    Allergic     Anxiety     Hypercholesterolemia     200/75/119       Past Surgical History:   Procedure Laterality Date    ACHILLES TENDON REPAIR Left     Repair of Ruptured achilles tendon left    ACHILLES TENDON REPAIR Left     teen    ELBOW ARTHROPLASTY Left     ELBOW SURGERY      teen       Past OB/Gyn History:  Period Cycle (Days): 28  Period Duration (Days): 4-5  Period Pattern: Regular  Menstrual Flow: Moderate  Dysmenorrhea: NonePatient's last menstrual period was 2018 (exact date)  Menstrual History:  OB History      Para Term  AB Living    3 3 3     3    SAB TAB Ectopic Multiple Live Births            3           Patient's last menstrual period was 2018 (exact date)    Period Cycle (Days): 28  Period Duration (Days): 4-5  Period Pattern: Regular  Menstrual Flow: Moderate  Dysmenorrhea: None      History of sexually transmitted infection: hpv  Patient is currently sexually active: heterosexual  Birth control: vasectomy  Pap history - abnormal 2016 --> colpo  Normal pap in 2017    Family History:  Family History   Problem Relation Age of Onset    Hyperlipidemia Mother         Pure hypocholesterolemia    Aortic aneurysm Father     Hypertension Father     Osteoarthritis Father     Cancer Paternal Grandfather     Parkinsonism Paternal Grandfather     Hypertension Paternal Grandfather     Hypertension Paternal Grandmother        Social History:  Social History     Social History    Marital status: /Civil Union     Spouse name: N/A    Number of children: 3    Years of education: N/A     Occupational History    Full-time employment      Social History Main Topics    Smoking status: Never Smoker    Smokeless tobacco: Never Used    Alcohol use Yes      Comment: socially beer and wine    Drug use: No    Sexual activity: Yes     Partners: Male     Birth control/ protection: Male Sterilization     Other Topics Concern    Not on file     Social History Narrative    Caffeine use 2-3 cups/day        3 children 12, 23, 24    Working - OT assistant for [employer]         Presently lives with self  Patient is monogamous relationship  Patient is currently employed      Allergies:  No Known Allergies    Medications:    Current Outpatient Prescriptions:     albuterol (PROVENTIL HFA,VENTOLIN HFA) 90 mcg/act inhaler, Inhale 2 puffs every 6 (six) hours as needed for wheezing, Disp: 1 Inhaler, Rfl: 0    ALPRAZolam (XANAX) 0 25 mg tablet, Use 1/2 pill daily as needed, use sparingly, Disp: 20 tablet, Rfl: 0    fluticasone (FLONASE) 50 mcg/act nasal spray, 2 sprays into each nostril daily, Disp: 16 g, Rfl: 0    SUMAtriptan (IMITREX) 50 mg tablet, Take 1/2 to 1 tablet as needed for headache, Disp: 9 tablet, Rfl: 1    Review of Systems:  Review of Systems   Constitutional: Negative for activity change, appetite change, chills, fatigue, fever and unexpected weight change  HENT: Negative for hearing loss, mouth sores and nosebleeds  Eyes: Negative for visual disturbance  Respiratory: Negative for chest tightness, shortness of breath and wheezing  Cardiovascular: Negative for chest pain, palpitations and leg swelling  Gastrointestinal: Negative for abdominal distention, abdominal pain, blood in stool, constipation, diarrhea, nausea and vomiting  Endocrine: Negative for cold intolerance and heat intolerance  Genitourinary: Negative for difficulty urinating, dyspareunia, dysuria, flank pain, genital sores, hematuria, menstrual problem, pelvic pain, urgency, vaginal bleeding, vaginal discharge and vaginal pain  Musculoskeletal: Negative for back pain, myalgias and neck pain  Allergic/Immunologic: Negative for immunocompromised state  Neurological: Negative for dizziness, seizures, syncope, weakness, light-headedness and headaches  Hematological: Negative for adenopathy  Does not bruise/bleed easily  Psychiatric/Behavioral: Negative for agitation, behavioral problems, confusion, self-injury, sleep disturbance and suicidal ideas  The patient is not nervous/anxious  Physical Exam:  /64   Ht 5' 0 5" (1 537 m)   Wt 54 7 kg (120 lb 9 6 oz)   LMP 12/20/2018 (Exact Date)   Breastfeeding? No   BMI 23 17 kg/m²      Physical Exam   Constitutional: Vital signs are normal  She appears well-developed and well-nourished  Genitourinary: Vagina normal and uterus normal  Pelvic exam was performed with patient supine  There is no rash, tenderness or lesion on the right labia  There is no rash, tenderness or lesion on the left labia  Vagina exhibits rugosity  No tenderness or bleeding in the vagina  No vaginal discharge found   Right adnexum does not display mass, does not display tenderness and does not display fullness  Left adnexum does not display mass, does not display tenderness and does not display fullness  Cervix is parous  Cervix does not exhibit motion tenderness, lesion, discharge or polyp  Uterus is mobile and anteverted  Uterus is not enlarged or irregular (is regular)  HENT:   Head: Normocephalic  Neck: No thyromegaly present  Cardiovascular: Normal rate, regular rhythm and normal heart sounds  Pulmonary/Chest: Effort normal and breath sounds normal  No respiratory distress  She has no wheezes  Right breast exhibits no inverted nipple, no mass, no nipple discharge, no skin change and no tenderness  Left breast exhibits no inverted nipple, no mass, no nipple discharge, no skin change and no tenderness  Abdominal: Soft  She exhibits no distension  There is no tenderness  There is no guarding  Neurological: She is alert  Skin: Skin is warm and dry  Psychiatric: She has a normal mood and affect  Her behavior is normal    Vitals reviewed

## 2019-01-01 LAB
HPV HR 12 DNA CVX QL NAA+PROBE: NEGATIVE
HPV16 DNA CVX QL NAA+PROBE: NEGATIVE
HPV18 DNA CVX QL NAA+PROBE: NEGATIVE

## 2019-01-02 LAB
LAB AP GYN PRIMARY INTERPRETATION: NORMAL
Lab: NORMAL

## 2019-03-01 DIAGNOSIS — J01.90 ACUTE NON-RECURRENT SINUSITIS, UNSPECIFIED LOCATION: ICD-10-CM

## 2019-03-01 DIAGNOSIS — G43.709 CHRONIC MIGRAINE WITHOUT AURA WITHOUT STATUS MIGRAINOSUS, NOT INTRACTABLE: ICD-10-CM

## 2019-03-01 RX ORDER — FLUTICASONE PROPIONATE 50 MCG
2 SPRAY, SUSPENSION (ML) NASAL DAILY
Qty: 16 G | Refills: 0 | Status: SHIPPED | OUTPATIENT
Start: 2019-03-01 | End: 2019-04-01 | Stop reason: SDUPTHER

## 2019-03-01 RX ORDER — SUMATRIPTAN 50 MG/1
TABLET, FILM COATED ORAL
Qty: 9 TABLET | Refills: 1 | Status: SHIPPED | OUTPATIENT
Start: 2019-03-01 | End: 2019-04-11 | Stop reason: SDUPTHER

## 2019-04-01 DIAGNOSIS — J01.90 ACUTE NON-RECURRENT SINUSITIS, UNSPECIFIED LOCATION: ICD-10-CM

## 2019-04-01 RX ORDER — FLUTICASONE PROPIONATE 50 MCG
2 SPRAY, SUSPENSION (ML) NASAL DAILY
Qty: 16 G | Refills: 0 | Status: SHIPPED | OUTPATIENT
Start: 2019-04-01 | End: 2019-04-11 | Stop reason: SDUPTHER

## 2019-04-09 ENCOUNTER — TELEPHONE (OUTPATIENT)
Dept: INTERNAL MEDICINE CLINIC | Facility: CLINIC | Age: 44
End: 2019-04-09

## 2019-04-09 DIAGNOSIS — J01.90 ACUTE NON-RECURRENT SINUSITIS, UNSPECIFIED LOCATION: ICD-10-CM

## 2019-04-11 DIAGNOSIS — J01.90 ACUTE NON-RECURRENT SINUSITIS, UNSPECIFIED LOCATION: ICD-10-CM

## 2019-04-11 DIAGNOSIS — G43.709 CHRONIC MIGRAINE WITHOUT AURA WITHOUT STATUS MIGRAINOSUS, NOT INTRACTABLE: ICD-10-CM

## 2019-04-11 RX ORDER — FLUTICASONE PROPIONATE 50 MCG
2 SPRAY, SUSPENSION (ML) NASAL DAILY
Qty: 16 G | Refills: 0 | Status: SHIPPED | OUTPATIENT
Start: 2019-04-11 | End: 2019-10-04 | Stop reason: SDUPTHER

## 2019-04-11 RX ORDER — SUMATRIPTAN 50 MG/1
TABLET, FILM COATED ORAL
Qty: 9 TABLET | Refills: 0 | Status: SHIPPED | OUTPATIENT
Start: 2019-04-11 | End: 2019-06-07 | Stop reason: SDUPTHER

## 2019-04-11 RX ORDER — FLUTICASONE PROPIONATE 50 MCG
2 SPRAY, SUSPENSION (ML) NASAL DAILY
Qty: 16 G | Refills: 0 | Status: CANCELLED | OUTPATIENT
Start: 2019-04-11

## 2019-05-06 ENCOUNTER — OFFICE VISIT (OUTPATIENT)
Dept: URGENT CARE | Age: 44
End: 2019-05-06
Payer: COMMERCIAL

## 2019-05-06 ENCOUNTER — OFFICE VISIT (OUTPATIENT)
Dept: NEUROLOGY | Facility: CLINIC | Age: 44
End: 2019-05-06
Payer: COMMERCIAL

## 2019-05-06 VITALS
WEIGHT: 119 LBS | HEART RATE: 57 BPM | BODY MASS INDEX: 21.9 KG/M2 | DIASTOLIC BLOOD PRESSURE: 68 MMHG | HEIGHT: 62 IN | TEMPERATURE: 98.4 F | RESPIRATION RATE: 18 BRPM | OXYGEN SATURATION: 100 % | SYSTOLIC BLOOD PRESSURE: 139 MMHG

## 2019-05-06 VITALS
DIASTOLIC BLOOD PRESSURE: 68 MMHG | HEIGHT: 61 IN | BODY MASS INDEX: 22.54 KG/M2 | WEIGHT: 119.4 LBS | SYSTOLIC BLOOD PRESSURE: 100 MMHG | HEART RATE: 74 BPM

## 2019-05-06 DIAGNOSIS — G43.709 CHRONIC MIGRAINE WITHOUT AURA WITHOUT STATUS MIGRAINOSUS, NOT INTRACTABLE: Primary | ICD-10-CM

## 2019-05-06 DIAGNOSIS — S86.911A MUSCLE STRAIN OF RIGHT LOWER LEG, INITIAL ENCOUNTER: Primary | ICD-10-CM

## 2019-05-06 DIAGNOSIS — G43.829 MENSTRUAL MIGRAINE WITHOUT STATUS MIGRAINOSUS, NOT INTRACTABLE: ICD-10-CM

## 2019-05-06 PROCEDURE — 99214 OFFICE O/P EST MOD 30 MIN: CPT | Performed by: PHYSICIAN ASSISTANT

## 2019-05-06 PROCEDURE — S9088 SERVICES PROVIDED IN URGENT: HCPCS | Performed by: FAMILY MEDICINE

## 2019-05-06 PROCEDURE — 99213 OFFICE O/P EST LOW 20 MIN: CPT | Performed by: FAMILY MEDICINE

## 2019-05-06 RX ORDER — IBUPROFEN 200 MG
400 TABLET ORAL EVERY 6 HOURS PRN
COMMUNITY

## 2019-05-31 DIAGNOSIS — F41.9 ANXIETY: ICD-10-CM

## 2019-05-31 RX ORDER — ALPRAZOLAM 0.25 MG/1
TABLET ORAL
Qty: 10 TABLET | Refills: 0 | Status: SHIPPED | OUTPATIENT
Start: 2019-05-31 | End: 2019-07-19 | Stop reason: SDUPTHER

## 2019-06-07 DIAGNOSIS — G43.709 CHRONIC MIGRAINE WITHOUT AURA WITHOUT STATUS MIGRAINOSUS, NOT INTRACTABLE: ICD-10-CM

## 2019-06-07 RX ORDER — SUMATRIPTAN 50 MG/1
TABLET, FILM COATED ORAL
Qty: 9 TABLET | Refills: 0 | Status: SHIPPED | OUTPATIENT
Start: 2019-06-07 | End: 2019-07-19 | Stop reason: SDUPTHER

## 2019-06-14 ENCOUNTER — EVALUATION (OUTPATIENT)
Dept: PHYSICAL THERAPY | Age: 44
End: 2019-06-14
Payer: COMMERCIAL

## 2019-06-14 DIAGNOSIS — S86.112D GASTROCNEMIUS MUSCLE STRAIN, LEFT, SUBSEQUENT ENCOUNTER: Primary | ICD-10-CM

## 2019-06-14 PROCEDURE — 97161 PT EVAL LOW COMPLEX 20 MIN: CPT | Performed by: PHYSICAL THERAPIST

## 2019-06-28 ENCOUNTER — OFFICE VISIT (OUTPATIENT)
Dept: PHYSICAL THERAPY | Age: 44
End: 2019-06-28
Payer: COMMERCIAL

## 2019-06-28 DIAGNOSIS — S86.112D GASTROCNEMIUS MUSCLE STRAIN, LEFT, SUBSEQUENT ENCOUNTER: Primary | ICD-10-CM

## 2019-06-28 PROCEDURE — 97110 THERAPEUTIC EXERCISES: CPT | Performed by: PHYSICAL THERAPIST

## 2019-06-28 PROCEDURE — 97140 MANUAL THERAPY 1/> REGIONS: CPT | Performed by: PHYSICAL THERAPIST

## 2019-07-09 ENCOUNTER — OFFICE VISIT (OUTPATIENT)
Dept: PHYSICAL THERAPY | Age: 44
End: 2019-07-09
Payer: COMMERCIAL

## 2019-07-09 DIAGNOSIS — S86.112D GASTROCNEMIUS MUSCLE STRAIN, LEFT, SUBSEQUENT ENCOUNTER: Primary | ICD-10-CM

## 2019-07-09 PROCEDURE — 97140 MANUAL THERAPY 1/> REGIONS: CPT | Performed by: PHYSICAL THERAPIST

## 2019-07-09 PROCEDURE — 97110 THERAPEUTIC EXERCISES: CPT | Performed by: PHYSICAL THERAPIST

## 2019-07-09 NOTE — PROGRESS NOTES
Daily Note     Today's date: 2019  Patient name: George Crandall  : 1975  MRN: [id number]  Referring provider: Supriya Jordan, PT  Dx:   Encounter Diagnosis     ICD-10-CM    1  Gastrocnemius muscle strain, left, subsequent encounter S86 112D                   Subjective: Some modest gains noted  Objective: See treatment diary below      Assessment: Tolerated treatment well  Patient would benefit from continued PT      Plan: Continue per plan of care  Precautions: None      Manual              LC 5 minutes            Manual DF stretching 5 reps hold 30 sec              MRE's DF,inver, ever 2x10            Graston/STM 15 minutes                             Exercise Diary                           Cybex single leg press 2 sets - 70#            Cybex eccentric PF 3 sets - 70#            Biodex - DF/PF 3 sets                                                                                                                                                                                                                                Modalities

## 2019-07-12 ENCOUNTER — OFFICE VISIT (OUTPATIENT)
Dept: PHYSICAL THERAPY | Age: 44
End: 2019-07-12
Payer: COMMERCIAL

## 2019-07-12 DIAGNOSIS — S86.112D GASTROCNEMIUS MUSCLE STRAIN, LEFT, SUBSEQUENT ENCOUNTER: Primary | ICD-10-CM

## 2019-07-12 PROCEDURE — 97110 THERAPEUTIC EXERCISES: CPT | Performed by: PHYSICAL THERAPIST

## 2019-07-12 PROCEDURE — 97140 MANUAL THERAPY 1/> REGIONS: CPT | Performed by: PHYSICAL THERAPIST

## 2019-07-12 NOTE — PROGRESS NOTES
Daily Note     Today's date: 2019  Patient name: Re Walden  : 1975  MRN: [id number]  Referring provider: Kajal Myers, PT  Dx:   Encounter Diagnosis     ICD-10-CM    1  Gastrocnemius muscle strain, left, subsequent encounter S86 112D                   Subjective: Better - modest gains noted  Objective: See treatment diary below      Assessment: Tolerated treatment well  Patient would benefit from continued PT      Plan: Continue per plan of care  Precautions: None      Manual             LC 5 minutes 10 minutes           Manual DF stretching 5 reps hold 30 sec  x5 reps hold 30 sec             MRE's DF,inver, ever 2x10 2x10           Graston/STM 15 minutes 15 minutes                            Exercise Diary                           Cybex single leg press 2 sets - 70# 2 sets - 70#           Cybex eccentric PF 3 sets - 70# 3 sets - 70#           Biodex - DF/PF 3 sets 3 sets                                                                                                                                                                                                                               Modalities

## 2019-07-16 ENCOUNTER — OFFICE VISIT (OUTPATIENT)
Dept: PHYSICAL THERAPY | Age: 44
End: 2019-07-16
Payer: COMMERCIAL

## 2019-07-16 DIAGNOSIS — S86.112D GASTROCNEMIUS MUSCLE STRAIN, LEFT, SUBSEQUENT ENCOUNTER: Primary | ICD-10-CM

## 2019-07-16 PROCEDURE — 97140 MANUAL THERAPY 1/> REGIONS: CPT | Performed by: PHYSICAL THERAPIST

## 2019-07-16 PROCEDURE — 97110 THERAPEUTIC EXERCISES: CPT | Performed by: PHYSICAL THERAPIST

## 2019-07-16 NOTE — PROGRESS NOTES
Daily Note     Today's date: 2019  Patient name: Nial Oconnor  : 1975  MRN: [id number]  Referring provider: Andres Quinones, PT  Dx:   Encounter Diagnosis     ICD-10-CM    1  Gastrocnemius muscle strain, left, subsequent encounter S86 214D                   Subjective: Steady functional gains noted  Objective: See treatment diary below      Assessment: Tolerated treatment well  Patient would benefit from continued PT      Plan: Continue per plan of care  Precautions: None      Manual            LC 5 minutes 10 minutes 10 minutes          Manual DF stretching 5 reps hold 30 sec  x5 reps hold 30 sec  x5 reps hold 30 sec            MRE's DF,inver, ever 2x10 2x10 2x12          Graston/STM 15 minutes 15 minutes 15 minutes                           Exercise Diary                           Cybex single leg press 2 sets - 70# 2 sets - 70# 2 sets          Cybex eccentric PF 3 sets - 70# 3 sets - 70# 2 sets          Biodex - DF/PF 3 sets 3 sets 3x1 min             squats on  roll - 2x15             SLS R on blue foam with L hip TB movement                                                                                                                                                                                                    Modalities

## 2019-07-19 ENCOUNTER — OFFICE VISIT (OUTPATIENT)
Dept: PHYSICAL THERAPY | Age: 44
End: 2019-07-19
Payer: COMMERCIAL

## 2019-07-19 DIAGNOSIS — G43.709 CHRONIC MIGRAINE WITHOUT AURA WITHOUT STATUS MIGRAINOSUS, NOT INTRACTABLE: ICD-10-CM

## 2019-07-19 DIAGNOSIS — F41.9 ANXIETY: ICD-10-CM

## 2019-07-19 DIAGNOSIS — S86.112D GASTROCNEMIUS MUSCLE STRAIN, LEFT, SUBSEQUENT ENCOUNTER: Primary | ICD-10-CM

## 2019-07-19 PROCEDURE — 97110 THERAPEUTIC EXERCISES: CPT | Performed by: PHYSICAL THERAPIST

## 2019-07-19 PROCEDURE — 97140 MANUAL THERAPY 1/> REGIONS: CPT | Performed by: PHYSICAL THERAPIST

## 2019-07-19 RX ORDER — SUMATRIPTAN 50 MG/1
TABLET, FILM COATED ORAL
Qty: 9 TABLET | Refills: 0 | Status: SHIPPED | OUTPATIENT
Start: 2019-07-19 | End: 2019-09-27 | Stop reason: SDUPTHER

## 2019-07-19 RX ORDER — ALPRAZOLAM 0.25 MG/1
TABLET ORAL
Qty: 10 TABLET | Refills: 0 | Status: SHIPPED | OUTPATIENT
Start: 2019-07-19 | End: 2019-11-08 | Stop reason: SDUPTHER

## 2019-07-19 NOTE — PROGRESS NOTES
Daily Note     Today's date: 2019  Patient name: Shanelle Gonzales  : 1975  MRN: [id number]  Referring provider: Julia Tapia, PT  Dx:   Encounter Diagnosis     ICD-10-CM    1  Gastrocnemius muscle strain, left, subsequent encounter S86 112D                   Subjective: Steady progress      Objective: See treatment diary below      Assessment: Tolerated treatment well  Patient would benefit from continued PT      Plan: Continue per plan of care  Precautions: None      Manual           LC 5 minutes 10 minutes 10 minutes 10 minutes         Manual DF stretching 5 reps hold 30 sec  x5 reps hold 30 sec  x5 reps hold 30 sec   x5         MRE's DF,inver, ever 2x10 2x10 2x12 2x12         Graston/STM 15 minutes 15 minutes 15 minutes 15 minutes                          Exercise Diary                           Cybex single leg press 2 sets - 70# 2 sets - 70# 2 sets 2 sets         Cybex eccentric PF 3 sets - 70# 3 sets - 70# 2 sets 2 sets         Biodex - DF/PF 3 sets 3 sets 3x1 min 3x1 min            squats on  roll - 2x15 2x15            SLS R on blue foam with L hip TB movement completed                                                                                                                                                                                                   Modalities

## 2019-07-23 ENCOUNTER — OFFICE VISIT (OUTPATIENT)
Dept: PHYSICAL THERAPY | Age: 44
End: 2019-07-23
Payer: COMMERCIAL

## 2019-07-23 DIAGNOSIS — S86.112D GASTROCNEMIUS MUSCLE STRAIN, LEFT, SUBSEQUENT ENCOUNTER: Primary | ICD-10-CM

## 2019-07-23 PROCEDURE — 97140 MANUAL THERAPY 1/> REGIONS: CPT | Performed by: PHYSICAL THERAPIST

## 2019-07-23 PROCEDURE — 97110 THERAPEUTIC EXERCISES: CPT | Performed by: PHYSICAL THERAPIST

## 2019-07-23 NOTE — PROGRESS NOTES
Daily Note     Today's date: 2019  Patient name: Mario Parker  : 1975  MRN: [id number]  Referring provider: Jeremi Patton, PT  Dx:   Encounter Diagnosis     ICD-10-CM    1  Gastrocnemius muscle strain, left, subsequent encounter S86 661D                   Subjective: Ambulating down stairs is only issue at this point - minor  Objective: See treatment diary below      Assessment: Tolerated treatment well  Patient would benefit from continued PT      Plan: Continue per plan of care  Precautions: None      Manual          LC 5 minutes 10 minutes 10 minutes 10 minutes 10 minutes        Manual DF stretching 5 reps hold 30 sec  x5 reps hold 30 sec   x5 reps hold 30 sec  x5 x5        MRE's DF,inver, ever 2x10 2x10 2x12 2x12 2x12        Graston/STM 15 minutes 15 minutes 15 minutes 15 minutes 15 minutes                         Exercise Diary                           Cybex single leg press 2 sets - 70# 2 sets - 70# 2 sets 2 sets competed        Cybex eccentric PF 3 sets - 70# 3 sets - 70# 2 sets 2 sets completed        Biodex - DF/PF 3 sets 3 sets 3x1 min 3x1 min [id number]           squats on  roll - 2x15 2x15 2x15           SLS R on blue foam with L hip TB movement completed completed                                                                                                                                                                                                  Modalities

## 2019-09-27 DIAGNOSIS — G43.709 CHRONIC MIGRAINE WITHOUT AURA WITHOUT STATUS MIGRAINOSUS, NOT INTRACTABLE: ICD-10-CM

## 2019-09-27 RX ORDER — SUMATRIPTAN 50 MG/1
TABLET, FILM COATED ORAL
Qty: 9 TABLET | Refills: 0 | Status: SHIPPED | OUTPATIENT
Start: 2019-09-27 | End: 2019-11-27 | Stop reason: SDUPTHER

## 2019-10-04 ENCOUNTER — OFFICE VISIT (OUTPATIENT)
Dept: INTERNAL MEDICINE CLINIC | Facility: CLINIC | Age: 44
End: 2019-10-04
Payer: COMMERCIAL

## 2019-10-04 ENCOUNTER — APPOINTMENT (OUTPATIENT)
Dept: LAB | Facility: CLINIC | Age: 44
End: 2019-10-04
Payer: COMMERCIAL

## 2019-10-04 ENCOUNTER — TRANSCRIBE ORDERS (OUTPATIENT)
Dept: LAB | Facility: CLINIC | Age: 44
End: 2019-10-04

## 2019-10-04 ENCOUNTER — TELEPHONE (OUTPATIENT)
Dept: INTERNAL MEDICINE CLINIC | Facility: CLINIC | Age: 44
End: 2019-10-04

## 2019-10-04 VITALS
HEIGHT: 62 IN | DIASTOLIC BLOOD PRESSURE: 74 MMHG | TEMPERATURE: 98.1 F | BODY MASS INDEX: 22.45 KG/M2 | WEIGHT: 122 LBS | RESPIRATION RATE: 16 BRPM | OXYGEN SATURATION: 97 % | SYSTOLIC BLOOD PRESSURE: 110 MMHG | HEART RATE: 64 BPM

## 2019-10-04 DIAGNOSIS — Z00.00 LABORATORY EXAMINATION ORDERED AS PART OF A ROUTINE GENERAL MEDICAL EXAMINATION: ICD-10-CM

## 2019-10-04 DIAGNOSIS — F41.8 SITUATIONAL ANXIETY: ICD-10-CM

## 2019-10-04 DIAGNOSIS — J06.9 VIRAL UPPER RESPIRATORY TRACT INFECTION: ICD-10-CM

## 2019-10-04 DIAGNOSIS — J01.90 ACUTE NON-RECURRENT SINUSITIS, UNSPECIFIED LOCATION: ICD-10-CM

## 2019-10-04 DIAGNOSIS — R87.610 ATYPICAL SQUAMOUS CELL OF UNDETERMINED SIGNIFICANCE OF CERVIX: ICD-10-CM

## 2019-10-04 DIAGNOSIS — J30.89 ALLERGIC RHINITIS DUE TO OTHER ALLERGIC TRIGGER, UNSPECIFIED SEASONALITY: ICD-10-CM

## 2019-10-04 DIAGNOSIS — Z00.00 HEALTH MAINTENANCE EXAMINATION: ICD-10-CM

## 2019-10-04 DIAGNOSIS — Z00.00 ANNUAL PHYSICAL EXAM: ICD-10-CM

## 2019-10-04 DIAGNOSIS — Z12.31 ENCOUNTER FOR SCREENING MAMMOGRAM FOR BREAST CANCER: ICD-10-CM

## 2019-10-04 DIAGNOSIS — R73.03 PREDIABETES: ICD-10-CM

## 2019-10-04 DIAGNOSIS — G43.709 CHRONIC MIGRAINE WITHOUT AURA WITHOUT STATUS MIGRAINOSUS, NOT INTRACTABLE: ICD-10-CM

## 2019-10-04 LAB
ALBUMIN SERPL BCP-MCNC: 3.9 G/DL (ref 3.5–5)
ALP SERPL-CCNC: 42 U/L (ref 46–116)
ALT SERPL W P-5'-P-CCNC: 17 U/L (ref 12–78)
ANION GAP SERPL CALCULATED.3IONS-SCNC: 6 MMOL/L (ref 4–13)
AST SERPL W P-5'-P-CCNC: 13 U/L (ref 5–45)
BASOPHILS # BLD AUTO: 0.03 THOUSANDS/ΜL (ref 0–0.1)
BASOPHILS NFR BLD AUTO: 1 % (ref 0–1)
BILIRUB SERPL-MCNC: 0.4 MG/DL (ref 0.2–1)
BUN SERPL-MCNC: 11 MG/DL (ref 5–25)
CALCIUM SERPL-MCNC: 9 MG/DL (ref 8.3–10.1)
CHLORIDE SERPL-SCNC: 104 MMOL/L (ref 100–108)
CO2 SERPL-SCNC: 28 MMOL/L (ref 21–32)
CREAT SERPL-MCNC: 0.82 MG/DL (ref 0.6–1.3)
EOSINOPHIL # BLD AUTO: 0.1 THOUSAND/ΜL (ref 0–0.61)
EOSINOPHIL NFR BLD AUTO: 2 % (ref 0–6)
ERYTHROCYTE [DISTWIDTH] IN BLOOD BY AUTOMATED COUNT: 12.5 % (ref 11.6–15.1)
EST. AVERAGE GLUCOSE BLD GHB EST-MCNC: 114 MG/DL
GFR SERPL CREATININE-BSD FRML MDRD: 87 ML/MIN/1.73SQ M
GLUCOSE P FAST SERPL-MCNC: 73 MG/DL (ref 65–99)
HBA1C MFR BLD: 5.6 % (ref 4.2–6.3)
HCT VFR BLD AUTO: 39.7 % (ref 34.8–46.1)
HGB BLD-MCNC: 12.9 G/DL (ref 11.5–15.4)
IMM GRANULOCYTES # BLD AUTO: 0.02 THOUSAND/UL (ref 0–0.2)
IMM GRANULOCYTES NFR BLD AUTO: 0 % (ref 0–2)
LYMPHOCYTES # BLD AUTO: 1.72 THOUSANDS/ΜL (ref 0.6–4.47)
LYMPHOCYTES NFR BLD AUTO: 36 % (ref 14–44)
MCH RBC QN AUTO: 31.1 PG (ref 26.8–34.3)
MCHC RBC AUTO-ENTMCNC: 32.5 G/DL (ref 31.4–37.4)
MCV RBC AUTO: 96 FL (ref 82–98)
MONOCYTES # BLD AUTO: 0.46 THOUSAND/ΜL (ref 0.17–1.22)
MONOCYTES NFR BLD AUTO: 10 % (ref 4–12)
NEUTROPHILS # BLD AUTO: 2.45 THOUSANDS/ΜL (ref 1.85–7.62)
NEUTS SEG NFR BLD AUTO: 51 % (ref 43–75)
NRBC BLD AUTO-RTO: 0 /100 WBCS
PLATELET # BLD AUTO: 205 THOUSANDS/UL (ref 149–390)
PMV BLD AUTO: 10.1 FL (ref 8.9–12.7)
POTASSIUM SERPL-SCNC: 4.2 MMOL/L (ref 3.5–5.3)
PROT SERPL-MCNC: 7.4 G/DL (ref 6.4–8.2)
RBC # BLD AUTO: 4.15 MILLION/UL (ref 3.81–5.12)
SODIUM SERPL-SCNC: 138 MMOL/L (ref 136–145)
TSH SERPL DL<=0.05 MIU/L-ACNC: 2.98 UIU/ML (ref 0.36–3.74)
WBC # BLD AUTO: 4.78 THOUSAND/UL (ref 4.31–10.16)

## 2019-10-04 PROCEDURE — 83036 HEMOGLOBIN GLYCOSYLATED A1C: CPT | Performed by: INTERNAL MEDICINE

## 2019-10-04 PROCEDURE — 99396 PREV VISIT EST AGE 40-64: CPT | Performed by: INTERNAL MEDICINE

## 2019-10-04 PROCEDURE — 85025 COMPLETE CBC W/AUTO DIFF WBC: CPT | Performed by: INTERNAL MEDICINE

## 2019-10-04 PROCEDURE — 36415 COLL VENOUS BLD VENIPUNCTURE: CPT | Performed by: INTERNAL MEDICINE

## 2019-10-04 PROCEDURE — 80053 COMPREHEN METABOLIC PANEL: CPT | Performed by: INTERNAL MEDICINE

## 2019-10-04 PROCEDURE — 84443 ASSAY THYROID STIM HORMONE: CPT | Performed by: INTERNAL MEDICINE

## 2019-10-04 RX ORDER — ALBUTEROL SULFATE 90 UG/1
2 AEROSOL, METERED RESPIRATORY (INHALATION) EVERY 6 HOURS PRN
Qty: 1 INHALER | Refills: 1 | Status: SHIPPED | OUTPATIENT
Start: 2019-10-04 | End: 2020-01-15 | Stop reason: SDUPTHER

## 2019-10-04 RX ORDER — FLUTICASONE PROPIONATE 50 MCG
1 SPRAY, SUSPENSION (ML) NASAL DAILY PRN
Qty: 16 G | Refills: 2 | Status: SHIPPED | OUTPATIENT
Start: 2019-10-04 | End: 2020-02-07 | Stop reason: SDUPTHER

## 2019-10-04 NOTE — ASSESSMENT & PLAN NOTE
Instructed to use steroid nasal spray daily for the next week then as needed  May use saline spray daily

## 2019-10-04 NOTE — ASSESSMENT & PLAN NOTE
Stable, did not take vitamin supplements  Instructed to take ibuprofen and acetaminophen at headache onset  She has Imitrex to take prn

## 2019-10-04 NOTE — PROGRESS NOTES
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     Schedule PAPs in January  Other    Prediabetes    Relevant Orders    Hemoglobin A1C    Situational anxiety     Stable, rare use of alprazolam            Other Visit Diagnoses     Health maintenance examination    -  Primary    Eye exam and mammogram due, schedule PAPs next year  Encounter for screening mammogram for breast cancer        Relevant Orders    Mammo screening bilateral w cad    Acute non-recurrent sinusitis, unspecified location        Relevant Medications    fluticasone (FLONASE) 50 mcg/act nasal spray    Viral upper respiratory tract infection        Relevant Medications    albuterol (PROVENTIL HFA,VENTOLIN HFA) 90 mcg/act inhaler    Laboratory examination ordered as part of a routine general medical examination        Relevant Orders    CBC and differential    Comprehensive metabolic panel    TSH, 3rd generation with Free T4 reflex    Hemoglobin A1C          Immunizations and preventive care screenings were discussed with patient today  Appropriate education was printed on patient's after visit summary  Counseling:  Dental Health: discussed importance of regular tooth brushing, flossing, and dental visits    · Exercise: the importance of regular exercise/physical activity was discussed  Recommend exercise 3-5 times per week for at least 30 minutes  Return in about 1 year (around 10/4/2020)  Chief Complaint:     Chief Complaint   Patient presents with    Annual Exam      History of Present Illness:     Adult Annual Physical   Patient here for a comprehensive physical exam  The patient reports new no problems  She still experiences headaches, usually with stress, certain foods or during menstrual period  She takes Tylenol or ibuprofen as needed, also takes Imitrex, half tablet as needed  She did see Urology last year, did not try any vitamin supplements  She continues to experience intermittent allergy symptoms  She has been recovering from a cold  She has Flonase and saline spray which she does not use regularly  She reports occasional wheezing if she has a bad cold, requests a refill of her albuterol  She rarely takes Xanax, anxiety usually related to work  Diet and Physical Activity  · Diet/Nutrition: well balanced diet  · Exercise: no formal exercise  Depression Screening  PHQ-9 Depression Screening    PHQ-9:    Frequency of the following problems over the past two weeks:            General Health  · Sleep: sleeps well  · Hearing: normal - bilateral   · Vision: most recent eye exam >1 year ago  · Dental: regular dental visits  /GYN Health  · Patient is: Connie ArcherJimmy · Last menstrual period:   · Contraceptive method:   [facility] Review of Systems:     Review of Systems   Constitutional: Negative for appetite change and fatigue  HENT: Positive for congestion and postnasal drip  Negative for ear pain  Eyes: Positive for visual disturbance  Respiratory: Negative for cough, shortness of breath and wheezing  Cardiovascular: Negative for chest pain and leg swelling  Gastrointestinal: Negative for abdominal pain, constipation and diarrhea     Genitourinary: Negative for dysuria, frequency, urgency and vaginal discharge  Musculoskeletal: Negative for arthralgias and myalgias  Skin: Negative for rash and wound  Neurological: Positive for headaches  Negative for dizziness and numbness  Psychiatric/Behavioral: Negative for sleep disturbance  The patient is not nervous/anxious         Past Medical History:     Past Medical History:   Diagnosis Date    Allergic     Anxiety     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     Bronchitis     HPV in female     Hypercholesterolemia     200/75/119    Menstrual migraine     Migraines       Past Surgical History:     Past Surgical History:   Procedure Laterality Date    ACHILLES TENDON REPAIR Left     Repair of Ruptured achilles tendon left    ACHILLES TENDON REPAIR Left     teen    ELBOW ARTHROPLASTY Left     ELBOW SURGERY      teen      Social History:     Social History     Socioeconomic History    Marital status: /Civil Union     Spouse name: None    Number of children: 3    Years of education: None    Highest education level: None   Occupational History    Occupation: Full-time employment   Social Needs    Financial resource strain: None    Food insecurity:     Worry: None     Inability: None    Transportation needs:     Medical: None     Non-medical: None   Tobacco Use    Smoking status: Never Smoker    Smokeless tobacco: Never Used   Substance and Sexual Activity    Alcohol use: Yes     Comment: socially beer and wine    Drug use: No    Sexual activity: Yes     Partners: Male     Birth control/protection: Male Sterilization   Lifestyle    Physical activity:     Days per week: None     Minutes per session: None    Stress: None   Relationships    Social connections:     Talks on phone: None     Gets together: None     Attends Christian service: None     Active member of club or organization: None     Attends meetings of clubs or organizations: None     Relationship status: None    Intimate partner violence: Fear of current or ex partner: None     Emotionally abused: None     Physically abused: None     Forced sexual activity: None   Other Topics Concern    None   Social History Narrative    Caffeine use 2-3 cups/day        3 children 12, 23, 24    Working - OT assistant for [employer]      Family History:     Family History   Problem Relation Age of Onset    Hyperlipidemia Mother         Pure hypocholesterolemia    Aortic aneurysm Father     Hypertension Father     Osteoarthritis Father     Cancer Paternal Grandfather     Parkinsonism Paternal Grandfather     Hypertension Paternal Grandfather     Hypertension Paternal Grandmother       Current Medications:     Current Outpatient Medications   Medication Sig Dispense Refill    albuterol (PROVENTIL HFA,VENTOLIN HFA) 90 mcg/act inhaler Inhale 2 puffs every 6 (six) hours as needed for wheezing 1 Inhaler 1    ALPRAZolam (XANAX) 0 25 mg tablet Use 1/2 pill daily as needed, use sparingly 10 tablet 0    fluticasone (FLONASE) 50 mcg/act nasal spray 1 spray into each nostril daily as needed for rhinitis 16 g 2    ibuprofen (MOTRIN) 200 mg tablet Take 400 mg by mouth every 6 (six) hours as needed for mild pain      SUMAtriptan (IMITREX) 50 mg tablet Take 1/2 to 1 tablet as needed for headache 9 tablet 0     No current facility-administered medications for this visit  Allergies:     No Known Allergies   Physical Exam:     /74   Pulse 64   Temp 98 1 °F (36 7 °C)   Resp 16   Ht 5' 2" (1 575 m)   Wt 55 3 kg (122 lb)   SpO2 97%   BMI 22 31 kg/m²     Physical Exam   Constitutional: She is oriented to person, place, and time  She appears well-developed and well-nourished  HENT:   Head: Normocephalic and atraumatic  Right Ear: Tympanic membrane, external ear and ear canal normal    Left Ear: Tympanic membrane, external ear and ear canal normal    Nose: Mucosal edema present  No rhinorrhea  Right sinus exhibits no maxillary sinus tenderness   Left sinus exhibits no maxillary sinus tenderness  Eyes: Pupils are equal, round, and reactive to light  Conjunctivae are normal    Neck: Neck supple  Cardiovascular: Normal rate, regular rhythm and normal heart sounds  Pulmonary/Chest: Effort normal and breath sounds normal  She has no wheezes  She has no rales  Abdominal: Soft  Bowel sounds are normal    Musculoskeletal: She exhibits no edema  Lymphadenopathy:     She has no cervical adenopathy  Neurological: She is alert and oriented to person, place, and time  Skin: Skin is warm  No rash noted  Psychiatric: She has a normal mood and affect  Her behavior is normal    Nursing note and vitals reviewed        Aiden Duran MD  [address] [facility] INTERNAL MEDICINE

## 2019-10-04 NOTE — PATIENT INSTRUCTIONS

## 2019-10-04 NOTE — LETTER
Herber Noriega  [address] [employer] [facility]  [place] [id number]    Limit your carbs, just decrease the amount you would normal eat  Basic Carbohydrate Counting   AMBULATORY CARE:     Carbohydrate counting  is a way to plan your meals by counting the amount of carbohydrate in foods  Carbohydrates are the sugars, starches, and fiber found in fruit, grains, vegetables, and milk products  Carbohydrates increase your blood sugar levels  Carbohydrate counting can help you eat the right amount of carbohydrate to keep your blood sugar levels under control  What you need to know about planning meals using carbohydrate counting:  · A dietitian or healthcare provider will help you develop a healthy meal plan that works best for you  You will be taught how much carbohydrate to eat or drink for each meal and snack  Your meal plan will be based on your age, weight, usual food intake, and physical activity level  If you have diabetes, it will also include your blood sugar levels and diabetes medicine  Once you know how much carbohydrate you should eat, you can decide what type of food you want to eat  · You will need to know what foods contain carbohydrate and how much they contain  Keep track of the amount of carbohydrate in meals and snacks in order to follow your meal plan  Do not avoid carbohydrates or skip meals  Your blood sugar may fall too low if you do not eat enough carbohydrate or you skip meals  Foods that contain carbohydrate:   · Breads:  Each serving of food listed below contains about 15 g of carbohydrate   ¨ 1 slice of bread (1 ounce) or 1 flour or corn tortilla (6 inch)    ¨ ½ of a hamburger bun or ¼ of a large bagel (about 1 ounce)    ¨ 1 pancake (about 4 inches across and ¼ inch thick)    · Cereals and grains:  Serving sizes of ready-to-eat cereals vary  Look at the serving size and the total carbohydrate amount listed on the food label   Each serving of food listed below contains about 15 g of carbohydrate   ¨ ¾ cup of dry, unsweetened, ready-to-eat cereal or ¼ cup of low-fat granola     ¨ ½ cup of oatmeal or other cooked cereal     ¨ ? cup of cooked rice or pasta    · Starchy vegetables and beans:  Each serving of food listed below contains about 15 g of carbohydrate   ¨ ½ cup of corn, green peas, sweet potatoes, or mashed potatoes    ¨ ¼ of a large baked potato    ¨ ½ cup of beans, lentils, and peas (garbanzo, stevens, kidney, white, split, black-eyed)    · Crackers and snacks:  Each serving of food listed below contains about 15 g of carbohydrate   ¨ 3 lyndsey cracker squares or 8 animal crackers     ¨ 6 saltine-type crackers    ¨ 3 cups of popcorn or ¾ ounce of pretzels, potato chips, or tortilla chips    · Fruit:  Each serving of food listed below contains about 15 g of carbohydrate   ¨ 1 small (4 ounce) piece of fresh fruit or ¾ to 1 cup of fresh fruit    ¨ ½ cup of canned or frozen fruit, packed in natural juice    ¨ ½ cup (4 ounces) of unsweetened fruit juice    ¨ 2 tablespoons of dried fruit    · Desserts or sugary foods:  Each serving of food listed below contains about 15 g of carbohydrate   ¨ 2-inch square unfrosted cake or brownie     ¨ 2 small cookies    ¨ ½ cup of ice cream, frozen yogurt, or nondairy frozen yogurt    ¨ ¼ cup of sherbet or sorbet    ¨ 1 tablespoon of regular syrup, jam, or jelly    ¨ 2 tablespoons of light syrup    · Milk and yogurt:  Foods from the milk group contain about 12 g of carbohydrate per serving  ¨ 1 cup of fat-free or low-fat milk    ¨ 1 cup of soy milk    ¨ ? cup of fat-free, yogurt sweetened with artificial sweetener    · Non-starchy vegetables:  Each serving contains about 5 g of carbohydrate   Three servings of non-starch vegetables count as 1 carbohydrate serving  ¨ ½ cup of cooked vegetables or 1 cup of raw vegetables   This includes beets, broccoli, cabbage, cauliflower, cucumber, mushrooms, tomatoes, and zucchini    ¨ ½ cup of vegetable juice    How to use carbohydrate counting to plan meals:   · Count carbohydrate amounts using serving sizes:      ¨ Pasta dinner example: You plan to have pasta, tossed salad, and an 8-ounce glass of milk  Your healthcare provider tells you that you may have 4 carbohydrate servings for dinner  One carbohydrate serving of pasta is ? cup  One cup of pasta will equal 3 carbohydrate servings  An 8-ounce glass of milk will count as 1 carbohydrate serving  These amounts of food would equal 4 carbohydrate servings  One cup of tossed salad does not count toward your carbohydrate servings  · Count carbohydrate amounts using food labels:  Find the total amount of carbohydrate in a packaged food by reading the food label  Food labels tell you the serving size of the food and the total carbohydrate amount in each serving  Find the serving size on the food label and then decide how many servings you will eat  Multiply the number of servings you plan to eat by the carbohydrate amount per serving  ¨ Granola bar snack example: Your meal plan allows you to have 2 carbohydrate servings (30 grams) of carbohydrate for a snack  You plan to eat 1 package of granola bars, which contains 2 bars  According to the food label, the serving size of food in this package is 1 bar  Each serving (1 bar) contains 25 grams of carbohydrate  The total amount of carbohydrate in this package of granola bars would be 50 g  Based on your meal plan, you should eat only 1 bar  Follow up with your healthcare provider as directed:  Write down your questions so you remember to ask them during your visits

## 2019-10-25 ENCOUNTER — HOSPITAL ENCOUNTER (OUTPATIENT)
Dept: MAMMOGRAPHY | Facility: HOSPITAL | Age: 44
Discharge: HOME | End: 2019-10-25
Payer: COMMERCIAL

## 2019-10-25 VITALS — BODY MASS INDEX: 22.45 KG/M2 | HEIGHT: 62 IN | WEIGHT: 122 LBS

## 2019-10-25 DIAGNOSIS — Z12.31 ENCOUNTER FOR SCREENING MAMMOGRAM FOR BREAST CANCER: ICD-10-CM

## 2019-10-25 PROCEDURE — 77067 SCR MAMMO BI INCL CAD: CPT

## 2019-11-08 DIAGNOSIS — F41.9 ANXIETY: ICD-10-CM

## 2019-11-08 RX ORDER — ALPRAZOLAM 0.25 MG/1
TABLET ORAL
Qty: 10 TABLET | Refills: 0 | Status: SHIPPED | OUTPATIENT
Start: 2019-11-08 | End: 2020-03-13 | Stop reason: SDUPTHER

## 2019-11-27 DIAGNOSIS — G43.709 CHRONIC MIGRAINE WITHOUT AURA WITHOUT STATUS MIGRAINOSUS, NOT INTRACTABLE: ICD-10-CM

## 2019-11-27 RX ORDER — SUMATRIPTAN 50 MG/1
TABLET, FILM COATED ORAL
Qty: 9 TABLET | Refills: 0 | Status: SHIPPED | OUTPATIENT
Start: 2019-11-27 | End: 2020-01-15 | Stop reason: SDUPTHER

## 2020-01-15 DIAGNOSIS — G43.709 CHRONIC MIGRAINE WITHOUT AURA WITHOUT STATUS MIGRAINOSUS, NOT INTRACTABLE: ICD-10-CM

## 2020-01-15 DIAGNOSIS — J06.9 VIRAL UPPER RESPIRATORY TRACT INFECTION: ICD-10-CM

## 2020-01-15 RX ORDER — ALBUTEROL SULFATE 90 UG/1
2 AEROSOL, METERED RESPIRATORY (INHALATION) EVERY 6 HOURS PRN
Qty: 1 INHALER | Refills: 1 | Status: SHIPPED | OUTPATIENT
Start: 2020-01-15 | End: 2021-11-10 | Stop reason: SDUPTHER

## 2020-01-15 RX ORDER — SUMATRIPTAN 50 MG/1
TABLET, FILM COATED ORAL
Qty: 9 TABLET | Refills: 0 | Status: SHIPPED | OUTPATIENT
Start: 2020-01-15 | End: 2020-03-13 | Stop reason: SDUPTHER

## 2020-02-07 DIAGNOSIS — J01.90 ACUTE NON-RECURRENT SINUSITIS, UNSPECIFIED LOCATION: ICD-10-CM

## 2020-02-07 RX ORDER — FLUTICASONE PROPIONATE 50 MCG
1 SPRAY, SUSPENSION (ML) NASAL DAILY PRN
Qty: 16 G | Refills: 2 | Status: SHIPPED | OUTPATIENT
Start: 2020-02-07 | End: 2020-04-13 | Stop reason: SDUPTHER

## 2020-03-13 ENCOUNTER — OFFICE VISIT (OUTPATIENT)
Dept: PHYSICAL THERAPY | Facility: REHABILITATION | Age: 45
End: 2020-03-13
Payer: COMMERCIAL

## 2020-03-13 DIAGNOSIS — G43.709 CHRONIC MIGRAINE WITHOUT AURA WITHOUT STATUS MIGRAINOSUS, NOT INTRACTABLE: ICD-10-CM

## 2020-03-13 DIAGNOSIS — G89.29 CHRONIC NECK PAIN: Primary | ICD-10-CM

## 2020-03-13 DIAGNOSIS — M54.2 CHRONIC NECK PAIN: Primary | ICD-10-CM

## 2020-03-13 DIAGNOSIS — F41.9 ANXIETY: ICD-10-CM

## 2020-03-13 PROCEDURE — 97112 NEUROMUSCULAR REEDUCATION: CPT | Performed by: PHYSICAL THERAPIST

## 2020-03-13 PROCEDURE — 97140 MANUAL THERAPY 1/> REGIONS: CPT | Performed by: PHYSICAL THERAPIST

## 2020-03-13 PROCEDURE — 97162 PT EVAL MOD COMPLEX 30 MIN: CPT | Performed by: PHYSICAL THERAPIST

## 2020-03-13 RX ORDER — SUMATRIPTAN 50 MG/1
TABLET, FILM COATED ORAL
Qty: 9 TABLET | Refills: 0 | Status: SHIPPED | OUTPATIENT
Start: 2020-03-13 | End: 2020-05-08 | Stop reason: SDUPTHER

## 2020-03-13 RX ORDER — ALPRAZOLAM 0.25 MG/1
TABLET ORAL
Qty: 10 TABLET | Refills: 0 | Status: SHIPPED | OUTPATIENT
Start: 2020-03-13 | End: 2020-06-23 | Stop reason: SDUPTHER

## 2020-03-13 NOTE — PROGRESS NOTES
PT Evaluation     Today's date: 3/13/2020  Patient name: Kriss Desai  : 1975  MRN: [id number]  Referring provider: Juany Bass PT  Dx:   Encounter Diagnosis     ICD-10-CM    1  Chronic neck pain M54 2     G89 29        Start Time:   Stop Time: 820  Total time in clinic (min): 45 minutes    Assessment  Assessment details: Kriss Desai is a 38 y o  female presenting to outpatient physical therapy on 20 with referral from [place] [address] for chronic neck pain with headaches  MSIS consisted with CS rotation syndrome with B/L scapular ABD/Depression syndrome  Upon evaluation, Khang Helton demonstrates impaired posture, underlying upper CS and upper TS mobility deficits, weakness in scapular stabilizers  The listed impairments and functional limitation are effecting Khang Helton ability to function at prior level  They can continue to benefit from physical therapy services at this times in order to address the above discussed impairments and functional limitation in order to allow for a return to premorbid status     Pt  Tolerated tx well, no adverse resp to [facility] distraction as she reported feeling well post session    Impairments: abnormal muscle firing, abnormal muscle tone, abnormal or restricted ROM, abnormal movement, activity intolerance, impaired physical strength and pain with function  Understanding of Dx/Px/POC: good   Prognosis: good    Plan  Patient would benefit from: skilled PT  Planned modality interventions: thermotherapy: hydrocollator packs  Planned therapy interventions: joint mobilization, manual therapy, ADL training, neuromuscular re-education, home exercise program, therapeutic exercise, therapeutic activities, strengthening, patient education and functional ROM exercises  Frequency: 2x week  Duration in weeks: 4  Plan of Care beginning date: 2020  Treatment plan discussed with: patient        Subjective Evaluation    History of Present Illness  Mechanism of injury: Khang Helton is a 38 y o  female presenting to physical therapy on 20 with referral from [facility] for chronic neck pain that has been ongoing over the last few months  She denies any radicular symptoms or pain  She reports headaches up to once a week, may last 2-3 days  She reports stiffness in the mornings  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    Denies 5 Ds, 3 Ns  Pain  Current pain ratin  At worst pain ratin  Location: [place] [facility], [facility]      Diagnostic Tests  No diagnostic tests performed  Treatments  Previous treatment: massage  Patient Goals  Patient goals for therapy: decreased pain and increased strength      STG:  Pt  Will be independent with HEP  Pt  Will report 2 weeks w/o head aches  Pt  Will reported 50% reduction in pain  Pt  Will improve scapular strength (MT/LT/serratus) by 1/2 grade    LTG:  Pt  Will improve FOTO to > goal  Pt  Will have resolved headaches  Pt  Will report 2/10 neck pain or less with 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     Objective     Posture: Forward head - increased upper CS extension, B/L scapular ABD with anterior humerus  Myotomes (L/R): unremarkable  Dermatome: (pinprick- L/R): Normal UE            Reflexes:  (L/R) C5-6: 3+   C5-6: 3+     C7: 3+      [place]:  + left      Supinator Sign:  +              Scapular position:  Abduction Syndrome: B/L     Downward Rotation Syndrome:  Right    Depression Syndrome: B/L    Ligamentous Testing:  Alar Ligament: normal  Transverse Ligament/Sharp Julissa: normal  VBI: neg    Palpation: Increased tone with TTP sub occipitals      Cervical  % of normal   Flex  WNL   Extn  WNL   SB Left WNL   SB Right ERP*   ROT Left ERP*   ROT Right ERP*   *Pull into upper trap        MMT         AROM          PROM    Shoulder       L       R        L           R      L     R   Flex  Abd  IR          ER                    Upper Trap         Mid Trap 3+ 3+       Low Trap 3+ 3       Serratus  4 4+           Neuro Dynamic Testing:  NT     Seated DNF Strength = Fair   Supine DNF endurance: 15"  Passive shoulder girdle elevation test: negative  (+) lat tightness    Rib Assessment:  Rib 1: Elevated:  neg  Hypomobile: no       Segmental mobility:   OAJ=   B/L LPS restriction  AAJ= normal    Mid CS= hypomobile C5-6 on R into L side slide      CTJ=  hypomobile  TS=hypomobile       Flowsheet Rows      Most Recent Value   PT/OT G-Codes   Current Score  75   Projected Score  87             Precautions: Standard       Manual  3/13            Supine T1-2,T4-6 PA G5            Supine UL OAJ distraction G5-perf B/L            SOR 2'                                          Exercise Diary  3/13            Self SOR 2'            Active lat stretch at wall (robot) 10x            Postural education 2'            [place] self STM             Pec minor stretch             LT lift + lift             Wall slide + lift off             DNF-45 deg                                                                                                                                                                             Modalities

## 2020-03-20 ENCOUNTER — APPOINTMENT (OUTPATIENT)
Dept: PHYSICAL THERAPY | Facility: REHABILITATION | Age: 45
End: 2020-03-20
Payer: COMMERCIAL

## 2020-03-27 ENCOUNTER — APPOINTMENT (OUTPATIENT)
Dept: PHYSICAL THERAPY | Facility: REHABILITATION | Age: 45
End: 2020-03-27
Payer: COMMERCIAL

## 2020-03-30 ENCOUNTER — TELEMEDICINE (OUTPATIENT)
Dept: PHYSICAL THERAPY | Facility: REHABILITATION | Age: 45
End: 2020-03-30
Payer: COMMERCIAL

## 2020-03-30 DIAGNOSIS — M54.2 CHRONIC NECK PAIN: Primary | ICD-10-CM

## 2020-03-30 DIAGNOSIS — G89.29 CHRONIC NECK PAIN: Primary | ICD-10-CM

## 2020-03-30 PROCEDURE — 97112 NEUROMUSCULAR REEDUCATION: CPT | Performed by: PHYSICAL THERAPIST

## 2020-03-30 NOTE — PROGRESS NOTES
Daily Note     Today's date: 3/30/2020  Patient name: Sudeep Escobar  : 1975  MRN: [id number]  Referring provider: Luba Grace, PT  Dx:   Encounter Diagnosis     ICD-10-CM    1  Chronic neck pain M54 2     G89 29        Start Time: 08  Stop Time: 09  Total time in clinic (min): 40 minutes    Subjective: Pt  Reports no adverse response from last session  Has been continuing with exercises for home (active lat stretch and self SOR)  Objective: See treatment diary below      Assessment: Tolerated treatment well  Patient reported feeling good with all exercises "I feel like I need them " Pull felt with left CS rotation that was improved with over hed elevation as LT lifts and Ys at wall with lift off were added to HEP  visit performed via face time, patient agreeable to privacy compromise  Plan: Continue per plan of care        Precautions: Standard       Manual              Supine T1-2,T4-6 PA             Supine UL OAJ distraction             SOR                                           Exercise Diary  3/30            Self SOR review            Active lat stretch at wall (robot) 10x            Postural education             [place] self 15'STM             Pec minor stretch             LT lift + lift             Wall slide + lift off 2x5  5"            DNF-45 deg             Self rib 2/TS mob on left 10x w/ breath,10x with bridge, 10x with left arm             LT lifts RTB  10"x5  2 sets            Education 15'                                                                                                                                     Modalities

## 2020-03-30 NOTE — PROGRESS NOTES
Telemedicine consent    Patient: Burns Castleman Sobrinski  Provider: Tati Meléndez PT  Provider located at [address] [place] [address]    After connecting through [employer], the patient was identified by name and date of birth  Angelic Smith was informed that this is a telemedicine visit which may not be secure and therefore, might not be HIPAA-compliant  Other methods to assure confidentiality were taken  No one else was in the room  She acknowledged consent and understanding of privacy and security of the platform  The patient has agreed to participate and understands they can discontinue the visit at any time  Patient is aware this is a billable service

## 2020-04-03 ENCOUNTER — TELEMEDICINE (OUTPATIENT)
Dept: PHYSICAL THERAPY | Facility: REHABILITATION | Age: 45
End: 2020-04-03
Payer: COMMERCIAL

## 2020-04-03 DIAGNOSIS — M54.2 CHRONIC NECK PAIN: Primary | ICD-10-CM

## 2020-04-03 DIAGNOSIS — G89.29 CHRONIC NECK PAIN: Primary | ICD-10-CM

## 2020-04-03 PROCEDURE — 97112 NEUROMUSCULAR REEDUCATION: CPT | Performed by: PHYSICAL THERAPIST

## 2020-04-06 ENCOUNTER — TELEMEDICINE (OUTPATIENT)
Dept: PHYSICAL THERAPY | Facility: REHABILITATION | Age: 45
End: 2020-04-06
Payer: COMMERCIAL

## 2020-04-06 DIAGNOSIS — M54.2 CHRONIC NECK PAIN: Primary | ICD-10-CM

## 2020-04-06 DIAGNOSIS — G89.29 CHRONIC NECK PAIN: Primary | ICD-10-CM

## 2020-04-06 PROCEDURE — 97112 NEUROMUSCULAR REEDUCATION: CPT | Performed by: PHYSICAL THERAPIST

## 2020-04-10 ENCOUNTER — TELEMEDICINE (OUTPATIENT)
Dept: PHYSICAL THERAPY | Facility: REHABILITATION | Age: 45
End: 2020-04-10
Payer: COMMERCIAL

## 2020-04-10 DIAGNOSIS — M54.2 CHRONIC NECK PAIN: Primary | ICD-10-CM

## 2020-04-10 DIAGNOSIS — G89.29 CHRONIC NECK PAIN: Primary | ICD-10-CM

## 2020-04-10 PROCEDURE — 97112 NEUROMUSCULAR REEDUCATION: CPT | Performed by: PHYSICAL THERAPIST

## 2020-04-10 PROCEDURE — 97140 MANUAL THERAPY 1/> REGIONS: CPT | Performed by: PHYSICAL THERAPIST

## 2020-04-13 DIAGNOSIS — J01.90 ACUTE NON-RECURRENT SINUSITIS, UNSPECIFIED LOCATION: ICD-10-CM

## 2020-04-13 RX ORDER — FLUTICASONE PROPIONATE 50 MCG
1 SPRAY, SUSPENSION (ML) NASAL DAILY PRN
Qty: 16 G | Refills: 2 | Status: SHIPPED | OUTPATIENT
Start: 2020-04-13 | End: 2020-06-23 | Stop reason: SDUPTHER

## 2020-05-01 ENCOUNTER — TELEPHONE (OUTPATIENT)
Dept: NEUROLOGY | Facility: CLINIC | Age: 45
End: 2020-05-01

## 2020-05-08 ENCOUNTER — TELEMEDICINE (OUTPATIENT)
Dept: NEUROLOGY | Facility: CLINIC | Age: 45
End: 2020-05-08
Payer: COMMERCIAL

## 2020-05-08 DIAGNOSIS — E53.8 B12 DEFICIENCY: ICD-10-CM

## 2020-05-08 DIAGNOSIS — G43.709 CHRONIC MIGRAINE WITHOUT AURA WITHOUT STATUS MIGRAINOSUS, NOT INTRACTABLE: ICD-10-CM

## 2020-05-08 DIAGNOSIS — G43.009 MIGRAINE WITHOUT AURA AND WITHOUT STATUS MIGRAINOSUS, NOT INTRACTABLE: ICD-10-CM

## 2020-05-08 DIAGNOSIS — E55.9 VITAMIN D DEFICIENCY: Primary | ICD-10-CM

## 2020-05-08 PROCEDURE — 99214 OFFICE O/P EST MOD 30 MIN: CPT | Performed by: PHYSICIAN ASSISTANT

## 2020-05-08 RX ORDER — DEXAMETHASONE 1 MG
1 TABLET ORAL DAILY PRN
Qty: 5 TABLET | Refills: 0 | Status: SHIPPED | OUTPATIENT
Start: 2020-05-08 | End: 2021-12-30

## 2020-05-08 RX ORDER — SUMATRIPTAN 50 MG/1
50 TABLET, FILM COATED ORAL AS NEEDED
Qty: 9 TABLET | Refills: 1 | Status: SHIPPED | OUTPATIENT
Start: 2020-05-08 | End: 2020-06-23 | Stop reason: SDUPTHER

## 2020-06-23 DIAGNOSIS — J01.90 ACUTE NON-RECURRENT SINUSITIS, UNSPECIFIED LOCATION: ICD-10-CM

## 2020-06-23 DIAGNOSIS — F41.9 ANXIETY: ICD-10-CM

## 2020-06-23 DIAGNOSIS — G43.709 CHRONIC MIGRAINE WITHOUT AURA WITHOUT STATUS MIGRAINOSUS, NOT INTRACTABLE: ICD-10-CM

## 2020-06-24 ENCOUNTER — TELEPHONE (OUTPATIENT)
Dept: OBGYN CLINIC | Facility: CLINIC | Age: 45
End: 2020-06-24

## 2020-06-24 RX ORDER — ALPRAZOLAM 0.25 MG/1
TABLET ORAL
Qty: 20 TABLET | Refills: 0 | Status: SHIPPED | OUTPATIENT
Start: 2020-06-24 | End: 2020-12-08 | Stop reason: SDUPTHER

## 2020-06-24 RX ORDER — SUMATRIPTAN 50 MG/1
50 TABLET, FILM COATED ORAL AS NEEDED
Qty: 9 TABLET | Refills: 1 | Status: SHIPPED | OUTPATIENT
Start: 2020-06-24 | End: 2020-07-27 | Stop reason: SDUPTHER

## 2020-06-24 RX ORDER — FLUTICASONE PROPIONATE 50 MCG
1 SPRAY, SUSPENSION (ML) NASAL DAILY PRN
Qty: 16 G | Refills: 2 | Status: SHIPPED | OUTPATIENT
Start: 2020-06-24 | End: 2021-04-29

## 2020-06-25 ENCOUNTER — ANNUAL EXAM (OUTPATIENT)
Dept: OBGYN CLINIC | Facility: CLINIC | Age: 45
End: 2020-06-25
Payer: COMMERCIAL

## 2020-06-25 VITALS
SYSTOLIC BLOOD PRESSURE: 102 MMHG | WEIGHT: 119 LBS | HEIGHT: 62 IN | DIASTOLIC BLOOD PRESSURE: 62 MMHG | TEMPERATURE: 99.1 F | BODY MASS INDEX: 21.9 KG/M2

## 2020-06-25 DIAGNOSIS — Z12.11 ENCOUNTER FOR SCREENING COLONOSCOPY FOR NON-HIGH-RISK PATIENT: ICD-10-CM

## 2020-06-25 DIAGNOSIS — Z01.419 ENCOUNTER FOR WELL WOMAN EXAM WITH ROUTINE GYNECOLOGICAL EXAM: Primary | ICD-10-CM

## 2020-06-25 DIAGNOSIS — B97.7 HPV IN FEMALE: ICD-10-CM

## 2020-06-25 DIAGNOSIS — Z12.31 ENCOUNTER FOR SCREENING MAMMOGRAM FOR MALIGNANT NEOPLASM OF BREAST: ICD-10-CM

## 2020-06-25 PROCEDURE — 99396 PREV VISIT EST AGE 40-64: CPT | Performed by: OBSTETRICS & GYNECOLOGY

## 2020-07-27 DIAGNOSIS — G43.709 CHRONIC MIGRAINE WITHOUT AURA WITHOUT STATUS MIGRAINOSUS, NOT INTRACTABLE: ICD-10-CM

## 2020-07-28 RX ORDER — SUMATRIPTAN 50 MG/1
50 TABLET, FILM COATED ORAL AS NEEDED
Qty: 9 TABLET | Refills: 1 | Status: SHIPPED | OUTPATIENT
Start: 2020-07-28 | End: 2020-09-18 | Stop reason: SDUPTHER

## 2020-07-28 NOTE — TELEPHONE ENCOUNTER
Recently She has been taking it 1-2 times a week      States she tends to gets migraines with the heat and everything

## 2020-09-18 DIAGNOSIS — G43.709 CHRONIC MIGRAINE WITHOUT AURA WITHOUT STATUS MIGRAINOSUS, NOT INTRACTABLE: ICD-10-CM

## 2020-09-18 RX ORDER — SUMATRIPTAN 50 MG/1
50 TABLET, FILM COATED ORAL AS NEEDED
Qty: 9 TABLET | Refills: 1 | Status: SHIPPED | OUTPATIENT
Start: 2020-09-18 | End: 2020-11-27 | Stop reason: SDUPTHER

## 2020-09-18 NOTE — TELEPHONE ENCOUNTER
Pt requesting sumatriptan refill  Last prescribed by pcp but states that since this is for her migraine, she prefers if we take over prescribing it  Rx entered   If agreeable, pls sign off    thanks

## 2020-10-09 ENCOUNTER — OFFICE VISIT (OUTPATIENT)
Dept: INTERNAL MEDICINE CLINIC | Facility: CLINIC | Age: 45
End: 2020-10-09
Payer: COMMERCIAL

## 2020-10-09 VITALS
WEIGHT: 122 LBS | HEIGHT: 62 IN | DIASTOLIC BLOOD PRESSURE: 74 MMHG | BODY MASS INDEX: 22.45 KG/M2 | TEMPERATURE: 97.5 F | OXYGEN SATURATION: 98 % | SYSTOLIC BLOOD PRESSURE: 116 MMHG | HEART RATE: 57 BPM

## 2020-10-09 DIAGNOSIS — B35.1 ONYCHOMYCOSIS: ICD-10-CM

## 2020-10-09 DIAGNOSIS — R73.03 PREDIABETES: ICD-10-CM

## 2020-10-09 DIAGNOSIS — G43.009 MIGRAINE WITHOUT AURA AND WITHOUT STATUS MIGRAINOSUS, NOT INTRACTABLE: ICD-10-CM

## 2020-10-09 DIAGNOSIS — E78.00 PURE HYPERCHOLESTEROLEMIA: ICD-10-CM

## 2020-10-09 DIAGNOSIS — Z00.00 HEALTH MAINTENANCE EXAMINATION: Primary | ICD-10-CM

## 2020-10-09 DIAGNOSIS — J30.89 ALLERGIC RHINITIS DUE TO OTHER ALLERGIC TRIGGER, UNSPECIFIED SEASONALITY: ICD-10-CM

## 2020-10-09 DIAGNOSIS — Z00.00 LABORATORY EXAMINATION ORDERED AS PART OF A ROUTINE GENERAL MEDICAL EXAMINATION: ICD-10-CM

## 2020-10-09 PROCEDURE — 99396 PREV VISIT EST AGE 40-64: CPT | Performed by: INTERNAL MEDICINE

## 2020-10-09 RX ORDER — TERBINAFINE HYDROCHLORIDE 250 MG/1
250 TABLET ORAL DAILY
Qty: 84 TABLET | Refills: 0 | Status: SHIPPED | OUTPATIENT
Start: 2020-10-09 | End: 2021-01-01

## 2020-10-20 ENCOUNTER — TELEPHONE (OUTPATIENT)
Dept: NEUROLOGY | Facility: CLINIC | Age: 45
End: 2020-10-20

## 2020-10-23 ENCOUNTER — LAB (OUTPATIENT)
Dept: LAB | Facility: CLINIC | Age: 45
End: 2020-10-23
Payer: COMMERCIAL

## 2020-10-23 ENCOUNTER — TELEPHONE (OUTPATIENT)
Dept: INTERNAL MEDICINE CLINIC | Facility: CLINIC | Age: 45
End: 2020-10-23

## 2020-10-23 ENCOUNTER — TELEPHONE (OUTPATIENT)
Dept: NEUROLOGY | Facility: CLINIC | Age: 45
End: 2020-10-23

## 2020-10-23 DIAGNOSIS — E55.9 VITAMIN D DEFICIENCY: ICD-10-CM

## 2020-10-23 DIAGNOSIS — E55.9 VITAMIN D DEFICIENCY: Primary | ICD-10-CM

## 2020-10-23 DIAGNOSIS — R79.9 ABNORMAL BLOOD CHEMISTRY: Primary | ICD-10-CM

## 2020-10-23 DIAGNOSIS — E53.8 B12 DEFICIENCY: ICD-10-CM

## 2020-10-23 LAB
25(OH)D3 SERPL-MCNC: 23.9 NG/ML (ref 30–100)
ALBUMIN SERPL BCP-MCNC: 4.1 G/DL (ref 3.5–5)
ALP SERPL-CCNC: 39 U/L (ref 46–116)
ALT SERPL W P-5'-P-CCNC: 17 U/L (ref 12–78)
ANION GAP SERPL CALCULATED.3IONS-SCNC: 4 MMOL/L (ref 4–13)
AST SERPL W P-5'-P-CCNC: 17 U/L (ref 5–45)
BILIRUB SERPL-MCNC: 0.52 MG/DL (ref 0.2–1)
BUN SERPL-MCNC: 13 MG/DL (ref 5–25)
CALCIUM SERPL-MCNC: 9.6 MG/DL (ref 8.3–10.1)
CHLORIDE SERPL-SCNC: 107 MMOL/L (ref 100–108)
CHOLEST SERPL-MCNC: 193 MG/DL (ref 50–200)
CO2 SERPL-SCNC: 28 MMOL/L (ref 21–32)
CREAT SERPL-MCNC: 0.97 MG/DL (ref 0.6–1.3)
ERYTHROCYTE [DISTWIDTH] IN BLOOD BY AUTOMATED COUNT: 12.2 % (ref 11.6–15.1)
EST. AVERAGE GLUCOSE BLD GHB EST-MCNC: 108 MG/DL
GFR SERPL CREATININE-BSD FRML MDRD: 70 ML/MIN/1.73SQ M
GLUCOSE P FAST SERPL-MCNC: 78 MG/DL (ref 65–99)
HBA1C MFR BLD: 5.4 %
HCT VFR BLD AUTO: 41.9 % (ref 34.8–46.1)
HDLC SERPL-MCNC: 103 MG/DL
HGB BLD-MCNC: 13.6 G/DL (ref 11.5–15.4)
LDLC SERPL CALC-MCNC: 83 MG/DL (ref 0–100)
MCH RBC QN AUTO: 31.2 PG (ref 26.8–34.3)
MCHC RBC AUTO-ENTMCNC: 32.5 G/DL (ref 31.4–37.4)
MCV RBC AUTO: 96 FL (ref 82–98)
NONHDLC SERPL-MCNC: 90 MG/DL
PMV BLD AUTO: 11.8 FL (ref 8.9–12.7)
POTASSIUM SERPL-SCNC: 4.4 MMOL/L (ref 3.5–5.3)
PROT SERPL-MCNC: 7.9 G/DL (ref 6.4–8.2)
RBC # BLD AUTO: 4.36 MILLION/UL (ref 3.81–5.12)
SODIUM SERPL-SCNC: 139 MMOL/L (ref 136–145)
TRIGL SERPL-MCNC: 37 MG/DL
TSH SERPL DL<=0.05 MIU/L-ACNC: 2.75 UIU/ML (ref 0.36–3.74)
VIT B12 SERPL-MCNC: 1123 PG/ML (ref 100–900)
WBC # BLD AUTO: 3.5 THOUSAND/UL (ref 4.31–10.16)

## 2020-10-23 PROCEDURE — 85025 COMPLETE CBC W/AUTO DIFF WBC: CPT | Performed by: INTERNAL MEDICINE

## 2020-10-23 PROCEDURE — 83036 HEMOGLOBIN GLYCOSYLATED A1C: CPT | Performed by: INTERNAL MEDICINE

## 2020-10-23 PROCEDURE — 80061 LIPID PANEL: CPT | Performed by: INTERNAL MEDICINE

## 2020-10-23 PROCEDURE — 82306 VITAMIN D 25 HYDROXY: CPT

## 2020-10-23 PROCEDURE — 82607 VITAMIN B-12: CPT

## 2020-10-23 PROCEDURE — 84443 ASSAY THYROID STIM HORMONE: CPT | Performed by: INTERNAL MEDICINE

## 2020-10-23 PROCEDURE — 80053 COMPREHEN METABOLIC PANEL: CPT | Performed by: INTERNAL MEDICINE

## 2020-10-23 PROCEDURE — 36415 COLL VENOUS BLD VENIPUNCTURE: CPT | Performed by: INTERNAL MEDICINE

## 2020-10-23 RX ORDER — ERGOCALCIFEROL 1.25 MG/1
50000 CAPSULE ORAL WEEKLY
Qty: 8 CAPSULE | Refills: 0 | Status: SHIPPED | OUTPATIENT
Start: 2020-10-23 | End: 2021-10-15 | Stop reason: ALTCHOICE

## 2020-11-03 ENCOUNTER — TELEPHONE (OUTPATIENT)
Dept: INTERNAL MEDICINE CLINIC | Facility: CLINIC | Age: 45
End: 2020-11-03

## 2020-11-03 ENCOUNTER — TELEMEDICINE (OUTPATIENT)
Dept: NEUROLOGY | Facility: CLINIC | Age: 45
End: 2020-11-03
Payer: COMMERCIAL

## 2020-11-03 ENCOUNTER — TELEPHONE (OUTPATIENT)
Dept: NEUROLOGY | Facility: CLINIC | Age: 45
End: 2020-11-03

## 2020-11-03 DIAGNOSIS — E55.9 VITAMIN D DEFICIENCY: ICD-10-CM

## 2020-11-03 DIAGNOSIS — G43.009 MIGRAINE WITHOUT AURA AND WITHOUT STATUS MIGRAINOSUS, NOT INTRACTABLE: Primary | ICD-10-CM

## 2020-11-03 DIAGNOSIS — G44.099 TRIGEMINAL AUTONOMIC CEPHALGIAS: ICD-10-CM

## 2020-11-03 DIAGNOSIS — G43.829 MENSTRUAL MIGRAINE WITHOUT STATUS MIGRAINOSUS, NOT INTRACTABLE: ICD-10-CM

## 2020-11-03 PROCEDURE — 99214 OFFICE O/P EST MOD 30 MIN: CPT | Performed by: PSYCHIATRY & NEUROLOGY

## 2020-11-27 DIAGNOSIS — G43.709 CHRONIC MIGRAINE WITHOUT AURA WITHOUT STATUS MIGRAINOSUS, NOT INTRACTABLE: ICD-10-CM

## 2020-11-27 RX ORDER — SUMATRIPTAN 50 MG/1
50 TABLET, FILM COATED ORAL AS NEEDED
Qty: 9 TABLET | Refills: 4 | Status: SHIPPED | OUTPATIENT
Start: 2020-11-27 | End: 2021-09-10 | Stop reason: SDUPTHER

## 2020-12-08 DIAGNOSIS — F41.9 ANXIETY: ICD-10-CM

## 2020-12-08 RX ORDER — ALPRAZOLAM 0.25 MG/1
TABLET ORAL
Qty: 20 TABLET | Refills: 0 | Status: SHIPPED | OUTPATIENT
Start: 2020-12-08 | End: 2021-04-12 | Stop reason: SDUPTHER

## 2020-12-21 ENCOUNTER — IMMUNIZATIONS (OUTPATIENT)
Dept: FAMILY MEDICINE CLINIC | Facility: HOSPITAL | Age: 45
End: 2020-12-21
Payer: COMMERCIAL

## 2020-12-21 DIAGNOSIS — Z23 ENCOUNTER FOR IMMUNIZATION: ICD-10-CM

## 2020-12-21 PROCEDURE — 91300 SARS-COV-2 / COVID-19 MRNA VACCINE (PFIZER-BIONTECH) 30 MCG: CPT

## 2020-12-21 PROCEDURE — 0001A SARS-COV-2 / COVID-19 MRNA VACCINE (PFIZER-BIONTECH) 30 MCG: CPT

## 2020-12-31 ENCOUNTER — APPOINTMENT (OUTPATIENT)
Dept: LAB | Facility: CLINIC | Age: 45
End: 2020-12-31
Payer: COMMERCIAL

## 2020-12-31 ENCOUNTER — TELEPHONE (OUTPATIENT)
Dept: NEUROLOGY | Facility: CLINIC | Age: 45
End: 2020-12-31

## 2020-12-31 DIAGNOSIS — R79.9 ABNORMAL BLOOD CHEMISTRY: ICD-10-CM

## 2020-12-31 LAB
BASOPHILS # BLD AUTO: 0.04 THOUSANDS/ΜL (ref 0–0.1)
BASOPHILS NFR BLD AUTO: 1 % (ref 0–1)
EOSINOPHIL # BLD AUTO: 0.11 THOUSAND/ΜL (ref 0–0.61)
EOSINOPHIL NFR BLD AUTO: 2 % (ref 0–6)
ERYTHROCYTE [DISTWIDTH] IN BLOOD BY AUTOMATED COUNT: 13 % (ref 11.6–15.1)
HCT VFR BLD AUTO: 37.6 % (ref 34.8–46.1)
HGB BLD-MCNC: 11.9 G/DL (ref 11.5–15.4)
IMM GRANULOCYTES # BLD AUTO: 0.01 THOUSAND/UL (ref 0–0.2)
IMM GRANULOCYTES NFR BLD AUTO: 0 % (ref 0–2)
LYMPHOCYTES # BLD AUTO: 1.71 THOUSANDS/ΜL (ref 0.6–4.47)
LYMPHOCYTES NFR BLD AUTO: 33 % (ref 14–44)
MCH RBC QN AUTO: 30.2 PG (ref 26.8–34.3)
MCHC RBC AUTO-ENTMCNC: 31.6 G/DL (ref 31.4–37.4)
MCV RBC AUTO: 95 FL (ref 82–98)
MONOCYTES # BLD AUTO: 0.39 THOUSAND/ΜL (ref 0.17–1.22)
MONOCYTES NFR BLD AUTO: 7 % (ref 4–12)
NEUTROPHILS # BLD AUTO: 2.99 THOUSANDS/ΜL (ref 1.85–7.62)
NEUTS SEG NFR BLD AUTO: 57 % (ref 43–75)
NRBC BLD AUTO-RTO: 0 /100 WBCS
PLATELET # BLD AUTO: 225 THOUSANDS/UL (ref 149–390)
PMV BLD AUTO: 10.5 FL (ref 8.9–12.7)
RBC # BLD AUTO: 3.94 MILLION/UL (ref 3.81–5.12)
WBC # BLD AUTO: 5.25 THOUSAND/UL (ref 4.31–10.16)

## 2020-12-31 PROCEDURE — 36415 COLL VENOUS BLD VENIPUNCTURE: CPT

## 2020-12-31 PROCEDURE — 85025 COMPLETE CBC W/AUTO DIFF WBC: CPT

## 2021-01-10 ENCOUNTER — IMMUNIZATIONS (OUTPATIENT)
Dept: FAMILY MEDICINE CLINIC | Facility: HOSPITAL | Age: 46
End: 2021-01-10

## 2021-01-10 DIAGNOSIS — Z23 ENCOUNTER FOR IMMUNIZATION: ICD-10-CM

## 2021-01-10 PROCEDURE — 0002A SARS-COV-2 / COVID-19 MRNA VACCINE (PFIZER-BIONTECH) 30 MCG: CPT

## 2021-01-10 PROCEDURE — 91300 SARS-COV-2 / COVID-19 MRNA VACCINE (PFIZER-BIONTECH) 30 MCG: CPT

## 2021-01-21 ENCOUNTER — TELEPHONE (OUTPATIENT)
Dept: NEUROLOGY | Facility: CLINIC | Age: 46
End: 2021-01-21

## 2021-01-21 NOTE — TELEPHONE ENCOUNTER
Pt called in for a sumatriptan refill  Made here aware that there were additional refills that were sent on last script in November 2020  [facility]

## 2021-01-22 ENCOUNTER — TELEPHONE (OUTPATIENT)
Dept: NEUROLOGY | Facility: CLINIC | Age: 46
End: 2021-01-22

## 2021-01-26 ENCOUNTER — HOSPITAL ENCOUNTER (OUTPATIENT)
Dept: MAMMOGRAPHY | Facility: MEDICAL CENTER | Age: 46
Discharge: HOME | End: 2021-01-26
Payer: COMMERCIAL

## 2021-01-26 VITALS — BODY MASS INDEX: 22.45 KG/M2 | WEIGHT: 122 LBS | HEIGHT: 62 IN

## 2021-01-26 DIAGNOSIS — Z12.31 ENCOUNTER FOR SCREENING MAMMOGRAM FOR MALIGNANT NEOPLASM OF BREAST: ICD-10-CM

## 2021-01-26 PROCEDURE — 77063 BREAST TOMOSYNTHESIS BI: CPT

## 2021-01-26 PROCEDURE — 77067 SCR MAMMO BI INCL CAD: CPT

## 2021-02-12 ENCOUNTER — TELEPHONE (OUTPATIENT)
Dept: NEUROLOGY | Facility: CLINIC | Age: 46
End: 2021-02-12

## 2021-02-12 NOTE — TELEPHONE ENCOUNTER
Called pt to confirm upcoming apt in 2 weeks on 2/26/21 with  Dr Yana Salas    Asked pt if any new/worsening symptoms to report? Nothing to report at this time    Asked pt if they are unable to keep apt or interested in virtual apt to please call office, also advised of no show fee  Advised we will call closer to day of apt for COVID screening

## 2021-02-21 ENCOUNTER — APPOINTMENT (EMERGENCY)
Dept: RADIOLOGY | Facility: HOSPITAL | Age: 46
End: 2021-02-21
Payer: COMMERCIAL

## 2021-02-21 ENCOUNTER — HOSPITAL ENCOUNTER (EMERGENCY)
Facility: HOSPITAL | Age: 46
Discharge: HOME | End: 2021-02-21
Attending: EMERGENCY MEDICINE
Payer: COMMERCIAL

## 2021-02-21 VITALS
TEMPERATURE: 98.1 F | DIASTOLIC BLOOD PRESSURE: 87 MMHG | WEIGHT: 120 LBS | RESPIRATION RATE: 16 BRPM | HEIGHT: 62 IN | OXYGEN SATURATION: 100 % | HEART RATE: 96 BPM | SYSTOLIC BLOOD PRESSURE: 142 MMHG | BODY MASS INDEX: 22.08 KG/M2

## 2021-02-21 DIAGNOSIS — S83.91XA RIGHT KNEE SPRAIN: Primary | ICD-10-CM

## 2021-02-21 PROCEDURE — 99283 EMERGENCY DEPT VISIT LOW MDM: CPT

## 2021-02-21 PROCEDURE — 99284 EMERGENCY DEPT VISIT MOD MDM: CPT | Performed by: EMERGENCY MEDICINE

## 2021-02-21 PROCEDURE — 73564 X-RAY EXAM KNEE 4 OR MORE: CPT

## 2021-02-21 NOTE — DISCHARGE INSTRUCTIONS
Your x-rays in the ED were negative for fractures or dislocations  Information for our orthopedic specialists was included in your discharge paperwork, please follow up with them

## 2021-02-21 NOTE — ED ATTENDING ATTESTATION
Final Diagnosis:  1  Right knee sprain           ICharlotte MD, saw and evaluated the patient  All available labs and X-rays were ordered by me or the resident and have been reviewed by myself  I discussed the patient with the resident / non-physician and agree with the resident's / non-physician practitioner's findings and plan as documented in the resident's / non-physician practicitioner's note, except where noted  At this point, I agree with the current assessment done in the ED  I was present during key portions of all procedures performed unless otherwise stated  Chief Complaint   Patient presents with    Knee Pain     Pt was skiing and fell forward twisting her right knee  Pt states "the knee doesnt even really hurt, it just feels very unstable "       This is a 54 y o  female presenting for evaluation of RIGHT knee pain  She was skiing, and twisted it somehow, she doesn't know how  No LOC/head trauma  Has been having RIGHT knee pain since then  She feels her knee is unstable and hasn't tried to walk since then  No medications used  +ICE     PMH:   has a past medical history of Allergic, Anxiety, 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, Bronchitis, HPV in female, Hypercholesterolemia, Menstrual migraine, and Migraines  PSH:   has a past surgical history that includes Elbow Arthroplasty (Left); Achilles tendon repair (Left); Achilles tendon repair (Left); Elbow surgery; and [place] tooth extraction      Social:  Social History     Substance and Sexual Activity   Alcohol Use Yes    Comment: socially beer and wine     Social History     Tobacco Use   Smoking Status Never Smoker   Smokeless Tobacco Never Used     Social History     Substance and Sexual Activity   Drug Use No     PE:  Vitals:    02/21/21 1705   BP: 142/87   BP Location: Left arm   Pulse: 96   Resp: 16   Temp: 98 1 °F (36 7 °C)   TempSrc: Oral   SpO2: 100%   Weight: 54 4 kg (120 lb)   Height: 5' 2" (1 575 m)   General: VS reviewed  Appears in NAD  awake, alert  Well-nourished, well-developed  Appears stated age  Speaking normally in full sentences  Head: Normocephalic, atraumatic  Eyes: EOM-I  No diplopia  No hyphema  No subconjunctival hemorrhages  Symmetrical lids  ENT: Atraumatic external nose and ears  MMM  No malocclusion  No stridor  Normal phonation  No drooling  Normal swallowing  Neck: No JVD  CV: No pallor noted  Lungs:   No tachypnea  No respiratory distress  MSK:   FROM spontaneously on LEFT  Crutches at bedside  RIGHT:  There is no swelling what so ever  EHL/FHL/PF/DF/KF/KE/HF/HE 5/5  No saddle anesthesia  2+ patellar reflexes  Knee: AD/PD/Varus/Valgus/Lachman 5/5 without demonstration of joint laxity  No tenderness of the 5th metatarsal, no tenderness of fibular head, no cog-sensation with rotation along joint of LisFranc  I don't feel any joint instability  She doesn't really have any pain elicited but subjectively feels her knee will give out  Skin: Dry, intact  Neuro: Awake, alert, GCS15, CN II-XII grossly intact  Motor grossly intact  Psychiatric/Behavioral: Appropriate mood and affect   Exam: deferred  A:  - Knee pain  P:  - XR  - Knee sprain? Ligament tear? Likely the former but will treat as the latter  - Knee immbolizer  - offered NSAIDs    - 13 point ROS was performed and all are normal unless stated in the history above  - Nursing note reviewed  Vitals reviewed  - Orders placed by myself and/or advanced practitioner / resident     - Previous chart was reviewed  - No language barrier    - History obtained from patient  - There are no limitations to the history obtained  - Critical care time: Not applicable for this patient  Code Status: No Order  Advance Directive and Living Will:      Power of :    POLST:      Medications - No data to display  XR knee 4+ views Right injury   ED Interpretation   No fx        Final Result      No acute osseous abnormality  Workstation performed: [id number]           Orders Placed This Encounter   Procedures    XR knee 4+ views Right injury     Labs Reviewed - No data to display  Time reflects when diagnosis was documented in both MDM as applicable and the Disposition within this note     Time User Action Codes Description Comment    2/21/2021  6:39 PM Harper Astudillo [[id number]] Right knee sprain       ED Disposition     ED Disposition Condition Date/Time Comment    Discharge Stable Sun Feb 21, 2021  6:41 PM Nat[address] discharge to home/self care              Follow-up Information     Follow up With Specialties Details Why Contact Info Additional Information    [facility] Orthopedic Surgery  For Emergency Department Follow-up [place] 10 [address] [facility], [address] [facility]        Discharge Medication List as of 2/21/2021  6:48 PM      CONTINUE these medications which have NOT CHANGED    Details   albuterol (PROVENTIL HFA,VENTOLIN HFA) 90 mcg/act inhaler Inhale 2 puffs every 6 (six) hours as needed for wheezing, Starting Wed 1/15/2020, Normal      ALPRAZolam (XANAX) 0 25 mg tablet Use 1/2 pill daily as needed, use sparingly, Normal      dexamethasone (DECADRON) 1 mg tablet Take 1 tablet (1 mg total) by mouth daily as needed (for migraine on day 2), Starting Fri 5/8/2020, Normal      ergocalciferol (VITAMIN D2) 50,000 units Take 1 capsule (50,000 Units total) by mouth once a week, Starting Fri 10/23/2020, Normal      fluticasone (FLONASE) 50 mcg/act nasal spray 1 spray into each nostril daily as needed for rhinitis, Starting Wed 6/24/2020, Normal      ibuprofen (MOTRIN) 200 mg tablet Take 400 mg by mouth every 6 (six) hours as needed for mild pain, Historical Med      SUMAtriptan (IMITREX) 50 mg tablet Take 1 tablet (50 mg total) by mouth as needed for migraine May repeat in 2 hours, Starting Fri 2020, Normal           No discharge procedures on file  Prior to Admission Medications   Prescriptions Last Dose Informant Patient Reported? Taking? ALPRAZolam (XANAX) 0 25 mg tablet   No No   Sig: Use 1/2 pill daily as needed, use sparingly   SUMAtriptan (IMITREX) 50 mg tablet   No No   Sig: Take 1 tablet (50 mg total) by mouth as needed for migraine May repeat in 2 hours   albuterol (PROVENTIL HFA,VENTOLIN HFA) 90 mcg/act inhaler  Self No No   Sig: Inhale 2 puffs every 6 (six) hours as needed for wheezing   dexamethasone (DECADRON) 1 mg tablet  Self No No   Sig: Take 1 tablet (1 mg total) by mouth daily as needed (for migraine on day 2)   Patient not taking: Reported on 10/16/2020   ergocalciferol (VITAMIN D2) 50,000 units   No No   Sig: Take 1 capsule (50,000 Units total) by mouth once a week   fluticasone (FLONASE) 50 mcg/act nasal spray  Self No No   Si spray into each nostril daily as needed for rhinitis   ibuprofen (MOTRIN) 200 mg tablet  Self Yes No   Sig: Take 400 mg by mouth every 6 (six) hours as needed for mild pain      Facility-Administered Medications: None       Portions of the record may have been created with voice recognition software  Occasional wrong word or "sound a like" substitutions may have occurred due to the inherent limitations of voice recognition software  Read the chart carefully and recognize, using context, where substitutions have occurred      Electronically signed by:  Heidi Nelson

## 2021-02-21 NOTE — ED PROVIDER NOTES
History  Chief Complaint   Patient presents with    Knee Pain     Pt was skiing and fell forward twisting her right knee  Pt states "the knee doesnt even really hurt, it just feels very unstable "       HPI    67-year-old female presenting for evaluation of right knee pain  Patient was skiing earlier today, fell forward, twisted her right knee  Pain in the knee is minor but states that the knee feels very unstable and she is unwilling to put weight on that extremity  Has been taking acetaminophen and ibuprofen and using ice on the knee  States she heard a pop but unsure if that was her knee or if was her ski coming off  Denies head strike, taking blood thinners, loss of consciousness, injury elsewhere  Denies weakness or numbness in the knee  Prior to Admission Medications   Prescriptions Last Dose Informant Patient Reported? Taking?    ALPRAZolam (XANAX) 0 25 mg tablet   No No   Sig: Use 1/2 pill daily as needed, use sparingly   SUMAtriptan (IMITREX) 50 mg tablet   No No   Sig: Take 1 tablet (50 mg total) by mouth as needed for migraine May repeat in 2 hours   albuterol (PROVENTIL HFA,VENTOLIN HFA) 90 mcg/act inhaler  Self No No   Sig: Inhale 2 puffs every 6 (six) hours as needed for wheezing   dexamethasone (DECADRON) 1 mg tablet  Self No No   Sig: Take 1 tablet (1 mg total) by mouth daily as needed (for migraine on day 2)   Patient not taking: Reported on 10/16/2020   ergocalciferol (VITAMIN D2) 50,000 units   No No   Sig: Take 1 capsule (50,000 Units total) by mouth once a week   fluticasone (FLONASE) 50 mcg/act nasal spray  Self No No   Si spray into each nostril daily as needed for rhinitis   ibuprofen (MOTRIN) 200 mg tablet  Self Yes No   Sig: Take 400 mg by mouth every 6 (six) hours as needed for mild pain      Facility-Administered Medications: None       Past Medical History:   Diagnosis Date    Allergic     Anxiety     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     Bronchitis     HPV in female     Hypercholesterolemia     200/75/119    Menstrual migraine     Migraines        Past Surgical History:   Procedure Laterality Date    ACHILLES TENDON REPAIR Left     Repair of Ruptured achilles tendon left    ACHILLES TENDON REPAIR Left     teen    ELBOW ARTHROPLASTY Left     ELBOW SURGERY      teen    WISDOM TOOTH EXTRACTION         Family History   Problem Relation Age of Onset    Hyperlipidemia Mother         Pure hypocholesterolemia    Aortic aneurysm Father     Hypertension Father     Osteoarthritis Father     Cancer Paternal Grandfather     Parkinsonism Paternal Grandfather     Hypertension Paternal Grandfather     Hypertension Paternal Grandmother     No Known Problems Sister     No Known Problems Daughter     Cancer Maternal Grandfather         unknown type or dx age   Piero Bones No Known Problems Maternal Aunt     Leukemia Cousin 20     I have reviewed and agree with the history as documented  E-Cigarette/Vaping    E-Cigarette Use Never User      E-Cigarette/Vaping Substances    Nicotine No     THC No     CBD No     Flavoring No     Other No     Unknown No      Social History     Tobacco Use    Smoking status: Never Smoker    Smokeless tobacco: Never Used   Substance Use Topics    Alcohol use: Yes     Comment: socially beer and wine    Drug use: No        Review of Systems   Constitutional: Negative for chills and fever  Eyes: Negative for pain and visual disturbance  Cardiovascular: Negative for chest pain  Gastrointestinal: Negative for abdominal pain  Genitourinary: Negative for flank pain  Musculoskeletal: Positive for arthralgias and gait problem  Negative for back pain and neck pain  Skin: Negative for wound  Neurological: Negative for dizziness, syncope, weakness, light-headedness, numbness and headaches  All other systems reviewed and are negative        Physical Exam  ED Triage Vitals [02/21/21 1705]   Temperature Pulse Respirations Blood Pressure SpO2   98 1 °F (36 7 °C) 96 16 142/87 100 %      Temp Source Heart Rate Source Patient Position - Orthostatic VS BP Location FiO2 (%)   Oral Monitor Sitting Left arm --      Pain Score       No Pain             Orthostatic Vital Signs  Vitals:    02/21/21 1705   BP: 142/87   Pulse: 96   Patient Position - Orthostatic VS: Sitting       Physical Exam  Vitals signs reviewed  Constitutional:       General: She is not in acute distress  Appearance: Normal appearance  She is not ill-appearing or toxic-appearing  HENT:      Head: Normocephalic and atraumatic  Right Ear: External ear normal       Left Ear: External ear normal       Nose: Nose normal       Mouth/Throat:      Mouth: Mucous membranes are moist       Pharynx: Oropharynx is clear  Eyes:      Extraocular Movements: Extraocular movements intact  Conjunctiva/sclera: Conjunctivae normal       Pupils: Pupils are equal, round, and reactive to light  Cardiovascular:      Rate and Rhythm: Normal rate  Pulses:           Dorsalis pedis pulses are 2+ on the right side and 2+ on the left side  Posterior tibial pulses are 2+ on the right side and 2+ on the left side  Pulmonary:      Effort: Pulmonary effort is normal  No respiratory distress  Abdominal:      Palpations: Abdomen is soft  Tenderness: There is no abdominal tenderness  Musculoskeletal:         General: Swelling (Mild swelling to R knee  ) present  No tenderness or deformity  Comments: Negative anterior and posterior drawer tests  Negative Lachman's test   No laxity appreciated on valgus or varus stress  Skin:     General: Skin is warm  Capillary Refill: Capillary refill takes less than 2 seconds  Neurological:      General: No focal deficit present  Mental Status: She is alert and oriented to person, place, and time  Sensory: No sensory deficit           ED Medications  Medications - No data to display    Diagnostic Studies  Results Reviewed     None                 XR knee 4+ views Right injury   ED Interpretation by Lorraine Trujillo MD (02/21 1819)   No fx  Final Result by Robert Schneider MD (02/22 5688)      No acute osseous abnormality  Workstation performed: [id number]               Procedures  Procedures      ED Course                                       MDM  Number of Diagnoses or Management Options  Right knee sprain:   Diagnosis management comments: 69-year-old female presenting for evaluation of right knee pain after skiing accident  Patient states that she twisted her knee, heard pop, has felt that the knee joint has been on stable since the incident  Patient has not attempted to put weight on that lower extremity  Physical exam is significant for mild right knee swelling, all neurovascularly intact, negative anterior and posterior drawer test, negative Lachman test, no laxity appreciated on valgus and varus stress  My differential diagnosis includes but is not limited to knee sprain, tibial plateau fracture, ligamentous injury  Workup: Will get knee x-ray, patient deferred pain medications at this time  Dispo:  Pending negative x-ray, will place patient in a knee immobilizer and provide ortho follow-up  Knee x-ray negative for any fractures or dislocations on my interpretation  Nurse placed patient in knee immobilizer  Knee immobilizer was placed by nurse  Examined by me post splint placement  Splint is in good position and neurovascular exam intact after splint placement  I reviewed all testing with the patient: xray  I gave oral return precautions for what to return for in addition to the written return precautions  The patient (and any family present: n/a) verbalized understanding of the discharge instructions and warnings that would necessitate return to the Emergency Department    I specifically highlighted areas of special concern regarding the written and verbal discharge instructions and return precautions  All questions were answered prior to discharge  Disposition  Final diagnoses:   Right knee sprain     Time reflects when diagnosis was documented in both MDM as applicable and the Disposition within this note     Time User Action Codes Description Comment    2/21/2021  6:39 PM Ollie Olguin Add [[id number]] Right knee sprain       ED Disposition     ED Disposition Condition Date/Time Comment    Discharge Stable Sun Feb 21, 2021  6:41 PM [address] discharge to home/self care              Follow-up Information     Follow up With Specialties Details Why Contact Info Additional Information    [facility] Orthopedic Surgery  For Emergency Department Follow-up [place] 10 [address] [facility], [address] [facility]          Discharge Medication List as of 2/21/2021  6:48 PM      CONTINUE these medications which have NOT CHANGED    Details   albuterol (PROVENTIL HFA,VENTOLIN HFA) 90 mcg/act inhaler Inhale 2 puffs every 6 (six) hours as needed for wheezing, Starting Wed 1/15/2020, Normal      ALPRAZolam (XANAX) 0 25 mg tablet Use 1/2 pill daily as needed, use sparingly, Normal      dexamethasone (DECADRON) 1 mg tablet Take 1 tablet (1 mg total) by mouth daily as needed (for migraine on day 2), Starting Fri 5/8/2020, Normal      ergocalciferol (VITAMIN D2) 50,000 units Take 1 capsule (50,000 Units total) by mouth once a week, Starting Fri 10/23/2020, Normal      fluticasone (FLONASE) 50 mcg/act nasal spray 1 spray into each nostril daily as needed for rhinitis, Starting Wed 6/24/2020, Normal      ibuprofen (MOTRIN) 200 mg tablet Take 400 mg by mouth every 6 (six) hours as needed for mild pain, Historical Med      SUMAtriptan (IMITREX) 50 mg tablet Take 1 tablet (50 mg total) by mouth as needed for migraine May repeat in 2 hours, Starting Fri 11/27/2020, Normal           No discharge procedures on file  PDMP Review       Value Time User    PDMP Reviewed  Yes 12/8/2020  3:57 PM Earnest Escobedo MD           ED Provider  Attending physically available and evaluated Chana Medrano  I managed the patient along with the ED Attending      Electronically Signed by         Daniel North DO  02/22/21 1111

## 2021-02-22 ENCOUNTER — OFFICE VISIT (OUTPATIENT)
Dept: OBGYN CLINIC | Facility: OTHER | Age: 46
End: 2021-02-22
Payer: COMMERCIAL

## 2021-02-22 ENCOUNTER — TELEPHONE (OUTPATIENT)
Dept: NEUROLOGY | Facility: CLINIC | Age: 46
End: 2021-02-22

## 2021-02-22 ENCOUNTER — TELEPHONE (OUTPATIENT)
Dept: OBGYN CLINIC | Facility: HOSPITAL | Age: 46
End: 2021-02-22

## 2021-02-22 VITALS
WEIGHT: 123.8 LBS | BODY MASS INDEX: 22.78 KG/M2 | DIASTOLIC BLOOD PRESSURE: 69 MMHG | HEIGHT: 62 IN | SYSTOLIC BLOOD PRESSURE: 131 MMHG | HEART RATE: 86 BPM

## 2021-02-22 DIAGNOSIS — R29.898 DIFFICULTY IN WEIGHT BEARING: ICD-10-CM

## 2021-02-22 DIAGNOSIS — M25.361 KNEE INSTABILITY, RIGHT: ICD-10-CM

## 2021-02-22 DIAGNOSIS — M25.461 EFFUSION OF RIGHT KNEE: Primary | ICD-10-CM

## 2021-02-22 DIAGNOSIS — M25.561 ACUTE PAIN OF RIGHT KNEE: ICD-10-CM

## 2021-02-22 PROCEDURE — 99243 OFF/OP CNSLTJ NEW/EST LOW 30: CPT | Performed by: ORTHOPAEDIC SURGERY

## 2021-02-22 NOTE — TELEPHONE ENCOUNTER
Called patient to reschedule her 02/26/21 appointment due to Dr Adrianna Rosas not being in the office, but she stated that she did not want to reschedule since she already has an appointment with Garcia Obregno in April and is doing okay withy her headaches

## 2021-02-22 NOTE — TELEPHONE ENCOUNTER
Patient is calling requesting a work note  She is wondering if she is having one for being out of work today and tomorrow so she can rest, and another letting her employer know she needs to use the crutches

## 2021-02-22 NOTE — PROGRESS NOTES
Assessment:       1  Effusion of right knee    2  Knee instability, right    3  Acute pain of right knee    4  Difficulty in weight bearing          Plan:          I explained my current clinical findings and reviewed radiological findings with Lj Brar today  I was suspicion for potential ACL tear of her right knee along with an MCL sprain  At this time I have advised her to discontinue her knee immobilizer brace and she is encouraged to do active range of motion exercises as tolerated  She may weightbear and ambulate with the help of axillary crutches if tolerated  She may continue to take oral nonsteroidal anti-inflammatory medication and do some local ice application for comfort  I will request an MRI of her right knee for further assessment in this regard and follow up with her in the office subsequent to the   Right knee MRI  Subjective:     Patient ID: Angelic Smith is a 54 y o  female  Chief Complaint: right knee pain    HPI   Lj Brar is a 59-year-old lady who is here today for evaluation of right knee pain  She had a ski related injury yesterday when her foot got stuck and she fell forwards leading to a hyper extension of the right knee  Subsequently she had difficulty weight-bearing with instability  She has thereafter developed some gradual swelling of the right knee  She was seen at the [facility] emergency room and a plain radiograph of the right knee did not reveal any acute osseous injury  She was placed in a right knee immobilizer and is currently nonweightbearing using axillary crutches  Pain intensity is moderate present diffusely in the right knee and nonradiating  She denies any previous history of right knee injuries       Social History     Occupational History    Occupation: Full-time employment   Tobacco Use    Smoking status: Never Smoker    Smokeless tobacco: Never Used   Substance and Sexual Activity    Alcohol use: Yes     Comment: socially beer and wine  Drug use: No    Sexual activity: Yes     Partners: Male     Birth control/protection: Male Sterilization      Review of Systems   Constitutional: Negative  HENT: Negative  Eyes: Negative  Respiratory: Negative  Cardiovascular: Negative  Gastrointestinal: Negative  Endocrine: Negative  Genitourinary: Negative  Skin: Negative  Allergic/Immunologic: Negative  Neurological: Negative  Hematological: Negative  Psychiatric/Behavioral: Negative  Objective:     Ortho ExamPhysical Exam  Vitals signs and nursing note reviewed  Constitutional:       Appearance: She is well-developed  HENT:      Head: Normocephalic and atraumatic  Eyes:      Conjunctiva/sclera: Conjunctivae normal       Pupils: Pupils are equal, round, and reactive to light  Cardiovascular:      Rate and Rhythm: Normal rate and regular rhythm  Pulmonary:      Effort: Pulmonary effort is normal  No respiratory distress  Skin:     General: Skin is warm and dry  Findings: No erythema  Neurological:      Mental Status: She is alert and oriented to person, place, and time  Cranial Nerves: No cranial nerve deficit  Psychiatric:         Behavior: Behavior normal          Thought Content: Thought content normal          Judgment: Judgment normal            Right knee exam:   moderate effusion noted  Tender to palpation over the medial joint line  Range of motion is from full extension to about 100 of knee flexion with discomfort  Positive Lachman  Positive anterior drawer test   Negative posterior drawer test   Some discomfort with valgus stress testing  Further special tests were deferred due to patient's discomfort  Clinically intact distal neurovascular status  I have personally reviewed pertinent films in PACS and my interpretation is As noted in the HPI  Donna Nguyen

## 2021-02-24 NOTE — TELEPHONE ENCOUNTER
Patient is not sure if Dr Jovi Verma wants her going back to work but if she can, she needs the note stating that she can return to work on 2/25/21  It does need to state that she is using crutches & list all restrictions  Patient would like this in my chart & she would like us to call her when it is ready at [phone]

## 2021-02-25 NOTE — TELEPHONE ENCOUNTER
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

## 2021-02-25 NOTE — TELEPHONE ENCOUNTER
Patient is calling back in regards to her work letter,  It need states that she is able to return to work, and any restrictions  Please advise       CB# [phone]

## 2021-02-26 ENCOUNTER — TELEPHONE (OUTPATIENT)
Dept: OBGYN CLINIC | Facility: HOSPITAL | Age: 46
End: 2021-02-26

## 2021-02-26 ENCOUNTER — HOSPITAL ENCOUNTER (OUTPATIENT)
Dept: RADIOLOGY | Facility: HOSPITAL | Age: 46
Discharge: HOME | End: 2021-02-26
Attending: ORTHOPAEDIC SURGERY
Payer: COMMERCIAL

## 2021-02-26 DIAGNOSIS — M25.461 EFFUSION OF RIGHT KNEE: ICD-10-CM

## 2021-02-26 DIAGNOSIS — S83.511A RUPTURE OF ANTERIOR CRUCIATE LIGAMENT OF RIGHT KNEE, INITIAL ENCOUNTER: Primary | ICD-10-CM

## 2021-02-26 DIAGNOSIS — M25.361 KNEE INSTABILITY, RIGHT: ICD-10-CM

## 2021-02-26 DIAGNOSIS — M25.561 ACUTE PAIN OF RIGHT KNEE: ICD-10-CM

## 2021-02-26 DIAGNOSIS — R29.898 DIFFICULTY IN WEIGHT BEARING: ICD-10-CM

## 2021-02-26 PROCEDURE — G1004 CDSM NDSC: HCPCS

## 2021-02-26 PROCEDURE — 73721 MRI JNT OF LWR EXTRE W/O DYE: CPT

## 2021-02-26 NOTE — TELEPHONE ENCOUNTER
Patient sees Dr Willem Solitario    Patient received her results from her MRI and she states there's a tear in her knee  Patient wants to know if Dr Willem Solitario still wants to see the patient on 3/2 or if he should refer her to a surgeon instead  Not sure if Dr Willem Solitario is still in the office today  Patient would like a call back with either the answer or if the Dr is out of the office so she knows not to expect a call today      Call back # [phone]

## 2021-03-04 ENCOUNTER — OFFICE VISIT (OUTPATIENT)
Dept: OBGYN CLINIC | Facility: OTHER | Age: 46
End: 2021-03-04
Payer: COMMERCIAL

## 2021-03-04 VITALS
SYSTOLIC BLOOD PRESSURE: 145 MMHG | WEIGHT: 123 LBS | HEIGHT: 62 IN | DIASTOLIC BLOOD PRESSURE: 83 MMHG | BODY MASS INDEX: 22.63 KG/M2 | HEART RATE: 84 BPM

## 2021-03-04 DIAGNOSIS — S83.281A TEAR OF LATERAL MENISCUS OF RIGHT KNEE, CURRENT, UNSPECIFIED TEAR TYPE, INITIAL ENCOUNTER: ICD-10-CM

## 2021-03-04 DIAGNOSIS — S83.511A RUPTURE OF ANTERIOR CRUCIATE LIGAMENT OF RIGHT KNEE, INITIAL ENCOUNTER: Primary | ICD-10-CM

## 2021-03-04 DIAGNOSIS — S83.411A SPRAIN OF MEDIAL COLLATERAL LIGAMENT OF RIGHT KNEE, INITIAL ENCOUNTER: ICD-10-CM

## 2021-03-04 PROCEDURE — 99203 OFFICE O/P NEW LOW 30 MIN: CPT | Performed by: ORTHOPAEDIC SURGERY

## 2021-03-04 NOTE — PROGRESS NOTES
Assessment    Partial rupture of anterior cruciate ligament of right knee, initial encounter    Sprain of medial collateral ligament of right knee, initial encounter    Tear of lateral meniscus of right knee, current, unspecified tear type, initial encounter    -Physical Therapy ordered  -Hinge knee brace ordered: wear when weight bearing    Discussion and Plan:     I did discuss with the patient that she has a partial ACL tear with grade 2 MCL sprain and lateral meniscus tear of the anterior horn  Patient may continue with conservative treatment options at this time given the acute nature of the injury and the good endpoint to her ACL on exam today  Patient should work with physical therapy and progress as tolerated to functional activity  If the patient is unable to perform activities or be as active as she would like she may return for possible discussion of surgical intervention including ACL reconstruction and possible [partial lateral meniscectomy  Patient should wear a hinged knee brace when weight-bearing and will transition to a functional ACL brace  If needed in the future  I did let the patient know that regardless of surgical intervention or conservative treatment because of her injury osteoarthritis is likely within the knee  Patient understood this and would like to continue with conservative treatment which includes bracing and physical therapy at this time  Subjective:   Patient ID: Terence Schlatter is a 54 y o  female      The patient presents with a chief complaint of right knee pain  The pain began 1 week(s) ago and is associated with an acute injury  She was skiing and hyperextended the knee and felt a pop she believes  She did not have a lot of immediate pain and denies any swelling  The patient describes the pain as dull and 5 out of 10 in intensity  It is intermittent in timing, and localizes the pain to the medial joint line, lateral joint line, MCL      The pain is worse with walking and pivoting on the knee and relieved with rest  She admits to mechanical symptoms such as locking and catching  She admits to instability of the knee  Patient has not had treatment  The following portions of the patient's history were reviewed and updated as appropriate: allergies, current medications, past family history, past medical history, past social history, past surgical history and problem list     Review of Systems   Constitutional: Negative  HENT: Negative  Eyes: Negative  Respiratory: Negative  Cardiovascular: Negative  Gastrointestinal: Negative  Endocrine: Negative  Genitourinary: Negative  Musculoskeletal: Negative  Skin: Negative  Allergic/Immunologic: Negative  Neurological: Negative  Hematological: Negative  Psychiatric/Behavioral: Negative  Objective:  /83   Pulse 84   Ht 5' 2" (1 575 m)   Wt 55 8 kg (123 lb)   BMI 22 50 kg/m²       Right Knee Exam     Muscle Strength   The patient has normal right knee strength  Tenderness   The patient is experiencing tenderness in the lateral joint line, MCL and medial joint line  Range of Motion   The patient has normal right knee ROM  Tests   Víctor:  Lateral - positive  Varus: negative Valgus: positive  Lachman:  Anterior - positive    Posterior - negative  Drawer:  Anterior - negative    Posterior - negative  Pivot shift: negative  Patellar apprehension: negative    Other   Erythema: absent  Scars: absent  Sensation: normal  Pulse: present  Swelling: none  Effusion: no effusion present      Left Knee Exam   Left knee exam is normal     Tests   Víctor:  Medial - negative Lateral - negative  Varus: negative Valgus: negative  Lachman:  Anterior - negative    Posterior - negative  Drawer:  Anterior - negative     Posterior - negative            Physical Exam  Musculoskeletal:      Right knee: She exhibits no effusion             I have personally reviewed pertinent films in PACS and my interpretation is as follows  MRI of the right knee demonstrates high-grade partial mid substance ACL rupture  There are posterior lateral tibial plateau bone contusions as noted without evidence of fracture  There is a anterior horn lateral meniscus tear with partial articular surface tear of MCL      Scribe Attestation    I,:  Janette Hurtado am acting as a scribe while in the presence of the attending physician :       I,:  Inessa Landaverde MD personally performed the services described in this documentation    as scribed in my presence :

## 2021-03-04 NOTE — LETTER
March 4, 2021     Patient: Dilia Gonzales   YOB: 1975   Date of Visit: 3/4/2021       To Whom it May Concern:    Lurdes Preston is under my professional care  She was seen in my office on 3/4/2021  If you have any questions or concerns, please don't hesitate to call           Sincerely,          Krista Coleman MD        CC: No Recipients

## 2021-03-08 ENCOUNTER — EVALUATION (OUTPATIENT)
Dept: PHYSICAL THERAPY | Age: 46
End: 2021-03-08
Payer: COMMERCIAL

## 2021-03-08 DIAGNOSIS — S83.411A SPRAIN OF MEDIAL COLLATERAL LIGAMENT OF RIGHT KNEE, INITIAL ENCOUNTER: ICD-10-CM

## 2021-03-08 DIAGNOSIS — S83.511A RUPTURE OF ANTERIOR CRUCIATE LIGAMENT OF RIGHT KNEE, INITIAL ENCOUNTER: ICD-10-CM

## 2021-03-08 DIAGNOSIS — M25.561 ACUTE PAIN OF RIGHT KNEE: Primary | ICD-10-CM

## 2021-03-08 DIAGNOSIS — S83.281A TEAR OF LATERAL MENISCUS OF RIGHT KNEE, CURRENT, UNSPECIFIED TEAR TYPE, INITIAL ENCOUNTER: ICD-10-CM

## 2021-03-08 PROCEDURE — 97161 PT EVAL LOW COMPLEX 20 MIN: CPT | Performed by: PHYSICAL THERAPIST

## 2021-03-09 NOTE — PROGRESS NOTES
PT Evaluation     Today's date: 3/8/2021  Patient name: Tal Escobedo  : 1975  MRN: [id number]  Referring provider: Sasha Pruett MD  Dx:   Encounter Diagnosis     ICD-10-CM    1  Acute pain of right knee  M25 561    2  Partial rupture of anterior cruciate ligament of right knee, initial encounter  S83 511A Ambulatory referral to Physical Therapy   3  Grade 2 Sprain of medial collateral ligament of right knee, initial encounter  S83 411A Ambulatory referral to Physical Therapy   4  Tear of lateral meniscus of right knee, current, unspecified tear type, initial encounter  S83 281A Ambulatory referral to Physical Therapy                  Assessment  Assessment details: Patient with partial ACL tear, MCL tear, and Lateral meniscal tear presents with decreased AROM, PROM, pain, instability, and weakness  Patient is a good candidate for PT intervention - conservative care  Impairments: abnormal muscle firing, abnormal or restricted ROM, impaired physical strength, pain with function and weight-bearing intolerance  Understanding of Dx/Px/POC: good   Prognosis: fair    Goals  Short Term goals - 4 weeks  1  Patient will be independent HEP  2   Patient will report a 50% decrease in pain complaints  3   Increase strength 1/2 grade  4   Increase ROM 5-10 degrees  Long Term goals - 8 weeks  1  Patient will report elimination of pain complaints  2   Patient will return to all work related activities without restriction  3   Patient will return to all recreational activities without restriction  4   ROM WFL  5   Strength 5/5  Plan  Planned therapy interventions: manual therapy, neuromuscular re-education, strengthening and stretching  Frequency: 2x week  Duration in weeks: 6        Subjective Evaluation    History of Present Illness  Mechanism of injury: Patient injured knee skiing on 21  Minimal c/o pain noted - medial joint line    Pos instability and apprehension noted during evaluation and with movement patterns  Patient previously active and desires to return to this lifestyle  MD has recommended conservative care at this point and to assess ongoing  Quality of life: excellent    Pain  Current pain ratin  At best pain ratin  At worst pain ratin  Quality: throbbing, sharp and dull ache  Aggravating factors: standing and walking  Progression: improved    Patient Goals  Patient goals for therapy: increased strength, decreased pain, increased motion and independence with ADLs/IADLs          Objective     Active Range of Motion   Left Knee   Extension: -5 degrees     Right Knee   Flexion: 110 degrees with pain  Extension: 0 degrees     Strength/Myotome Testing     Right Knee   Flexion: 3  Extension: 3    Tests     Right Knee   Positive anterior Lachman  General Comments:      Knee Comments  Mild R knee joint swelling noted        Flowsheet Rows      Most Recent Value   PT/OT G-Codes   Current Score  37   Projected Score  71             Precautions: None      Manuals                                                                 Neuro Re-Ed                                                                                                        Ther Ex             LC             TKE                          Cybex knee flexion             Cybex knee ext             Cybex leg press             Seated hamstring stretch                          Mini squat             Step ups                                                                              Ther Activity                                       Gait Training                                       Modalities             NMES with ice

## 2021-03-12 ENCOUNTER — OFFICE VISIT (OUTPATIENT)
Dept: PHYSICAL THERAPY | Age: 46
End: 2021-03-12
Payer: COMMERCIAL

## 2021-03-12 DIAGNOSIS — M25.561 ACUTE PAIN OF RIGHT KNEE: Primary | ICD-10-CM

## 2021-03-12 PROCEDURE — 97112 NEUROMUSCULAR REEDUCATION: CPT | Performed by: PHYSICAL THERAPIST

## 2021-03-12 PROCEDURE — 97110 THERAPEUTIC EXERCISES: CPT | Performed by: PHYSICAL THERAPIST

## 2021-03-12 NOTE — PROGRESS NOTES
Daily Note     Today's date: 3/12/2021  Patient name: Malou Finley  : 1975  MRN: [id number]  Referring provider: Deborah Fontanez MD  Dx:   Encounter Diagnosis     ICD-10-CM    1  Acute pain of right knee  M25 561                   Subjective: No significant complaints offered      Objective: See treatment diary below      Assessment: Tolerated treatment well  Patient would benefit from continued PT  AROM and PROM look good  Plan: Continue per plan of care        Precautions: None      Manuals 3/12            LC x10 minutes                                                   Neuro Re-Ed                                                                                                        Ther Ex                          TKE 5# - 3x10            SLR flexion 2# - 2 sets            Cybex knee flexion 5# - 3 sets            Cybex knee ext nt            Cybex leg press 30# - 3 sets            Seated hamstring stretch nt                         Mini squat On / roll - 2x10            Step ups             Biodex - squat and shift 3x10                                                                Ther Activity                                       Gait Training                                       Modalities             NMES with ice x15 minutes

## 2021-03-13 DIAGNOSIS — F41.9 ANXIETY: ICD-10-CM

## 2021-03-15 ENCOUNTER — OFFICE VISIT (OUTPATIENT)
Dept: PHYSICAL THERAPY | Age: 46
End: 2021-03-15
Payer: COMMERCIAL

## 2021-03-15 ENCOUNTER — APPOINTMENT (OUTPATIENT)
Dept: PHYSICAL THERAPY | Age: 46
End: 2021-03-15
Payer: COMMERCIAL

## 2021-03-15 DIAGNOSIS — M25.561 ACUTE PAIN OF RIGHT KNEE: Primary | ICD-10-CM

## 2021-03-15 PROCEDURE — 97110 THERAPEUTIC EXERCISES: CPT | Performed by: PHYSICAL THERAPIST

## 2021-03-15 PROCEDURE — 97112 NEUROMUSCULAR REEDUCATION: CPT | Performed by: PHYSICAL THERAPIST

## 2021-03-16 NOTE — PROGRESS NOTES
Daily Note     Today's date: 3/15/2021  Patient name: Angelic Smith  : 1975  MRN: [id number]  Referring provider: Becky Corbett MD  Dx:   Encounter Diagnosis     ICD-10-CM    1  Acute pain of right knee  M25 561                   Subjective: Anterior knee pain soreness/pain persists  Objective: See treatment diary below      Assessment: Tolerated treatment well  Patient would benefit from continued PT      Plan: Continue per plan of care        Precautions: None      Manuals 3/12 3/15           LC x10 minutes 10 minutes                                                  Neuro Re-Ed                                                                                                        Ther Ex                          TKE 5# - 3x10 nt           SLR flexion 2# - 2 sets nt           Cybex knee flexion 5# - 3 sets 30#           Cybex knee ext nt nt           Cybex leg press 30# - 3 sets 30#           Seated hamstring stretch nt nt                        Mini squat On 1/2 roll - 2x10 2x10           Step ups  On BOSU           Biodex - squat and shift 3x10 1/2 star           Blue foam   Mini-squat                                                  Ther Activity                                       Gait Training                                       Modalities             NMES with ice x15 minutes

## 2021-03-17 ENCOUNTER — TELEPHONE (OUTPATIENT)
Dept: OBGYN CLINIC | Facility: HOSPITAL | Age: 46
End: 2021-03-17

## 2021-03-17 NOTE — TELEPHONE ENCOUNTER
Patient sees Dr Esther Miranda  Patient is calling because at her last office visit a hinged knee brace was ordered and she was calling to check the status to see if it has arrived          CB: [phone]

## 2021-03-19 ENCOUNTER — OFFICE VISIT (OUTPATIENT)
Dept: PHYSICAL THERAPY | Age: 46
End: 2021-03-19
Payer: COMMERCIAL

## 2021-03-19 DIAGNOSIS — M25.561 ACUTE PAIN OF RIGHT KNEE: Primary | ICD-10-CM

## 2021-03-19 PROCEDURE — 97110 THERAPEUTIC EXERCISES: CPT | Performed by: PHYSICAL THERAPIST

## 2021-03-19 PROCEDURE — 97112 NEUROMUSCULAR REEDUCATION: CPT | Performed by: PHYSICAL THERAPIST

## 2021-03-19 NOTE — PROGRESS NOTES
Daily Note     Today's date: 3/19/2021  Patient name: Janyce Ganser  : 1975  MRN: [id number]  Referring provider: Jamilah Marroquin MD  Dx:   Encounter Diagnosis     ICD-10-CM    1  Acute pain of right knee  M25 561                   Subjective: Knee felt better this week  Objective: See treatment diary below      Assessment: Tolerated treatment well  Patient would benefit from continued PT      Plan: Continue per plan of care        Precautions: None      Manuals 3/12 3/15 3/19          LC x10 minutes 10 minutes x10 minutes                                                 Neuro Re-Ed                                                                                                        Ther Ex                          TKE 5# - 3x10 nt 7 5#          SLR flexion 2# - 2 sets nt 3#          Cybex knee flexion 5# - 3 sets 30# 30#          Cybex knee ext nt nt nt          Cybex leg press 30# - 3 sets 30# nt          Seated hamstring stretch nt nt nt                       Mini squat On 1/2 roll - 2x10 2x10 3x10          Step ups  On BOSU 3x10          Biodex - squat and shift 3x10 1/2 star nt          Blue foam   Mini-squat nt          Resisted walking   backwards and sideways - x10                                    Ther Activity                                       Gait Training                                       Modalities             NMES with ice x15 minutes

## 2021-03-19 NOTE — TELEPHONE ENCOUNTER
Patient called to ask if you have an update on her knee brace    Please call [phone]  Thank you

## 2021-03-22 ENCOUNTER — OFFICE VISIT (OUTPATIENT)
Dept: PHYSICAL THERAPY | Age: 46
End: 2021-03-22
Payer: COMMERCIAL

## 2021-03-22 DIAGNOSIS — M25.561 ACUTE PAIN OF RIGHT KNEE: Primary | ICD-10-CM

## 2021-03-22 PROCEDURE — 97110 THERAPEUTIC EXERCISES: CPT | Performed by: PHYSICAL THERAPIST

## 2021-03-22 PROCEDURE — 97112 NEUROMUSCULAR REEDUCATION: CPT | Performed by: PHYSICAL THERAPIST

## 2021-03-23 RX ORDER — ALPRAZOLAM 0.25 MG/1
TABLET ORAL
Qty: 15 TABLET | Refills: 0 | OUTPATIENT
Start: 2021-03-23

## 2021-03-23 NOTE — PROGRESS NOTES
Daily Note     Today's date: 3/22/2021  Patient name: Malou Finley  : 1975  MRN: [id number]  Referring provider: Deborah Fontanez MD  Dx:   Encounter Diagnosis     ICD-10-CM    1  Acute pain of right knee  M25 561                   Subjective: Patient still with anterior/medial knee pain  Objective: See treatment diary below      Assessment: Tolerated treatment well  Patient would benefit from continued PT  Difficulty with hamstring activation  Plan: Continue per plan of care        Precautions: None      Manuals 3/12 3/15 3/19 3/22         LC x10 minutes 10 minutes x10 minutes x10 minutes                                                Neuro Re-Ed                                                                                                        Ther Ex                          TKE 5# - 3x10 nt 7 5# 7 5#         SLR flexion 2# - 2 sets nt 3# 4#         Cybex knee flexion 5# - 3 sets 30# 30# 10#         Cybex knee ext nt nt nt nt         Cybex leg press 30# - 3 sets 30# nt 50#         Seated hamstring stretch nt nt nt nt                      Mini squat On 1/2 roll - 2x10 2x10 3x10 nt         Step ups  On BOSU 3x10 2x10         Biodex - squat and shift 3x10 1/2 star nt nt         Blue foam   Mini-squat nt nt         Resisted walking   backwards and sideways - x10 x10         Physioball hamstring curls    2x10                                                                                       Ther Activity                                       Gait Training                                       Modalities             NMES with ice x15 minutes

## 2021-03-26 ENCOUNTER — OFFICE VISIT (OUTPATIENT)
Dept: PHYSICAL THERAPY | Age: 46
End: 2021-03-26
Payer: COMMERCIAL

## 2021-03-26 DIAGNOSIS — M25.561 ACUTE PAIN OF RIGHT KNEE: Primary | ICD-10-CM

## 2021-03-26 PROCEDURE — 97112 NEUROMUSCULAR REEDUCATION: CPT | Performed by: PHYSICAL THERAPIST

## 2021-03-26 PROCEDURE — 97110 THERAPEUTIC EXERCISES: CPT | Performed by: PHYSICAL THERAPIST

## 2021-03-26 NOTE — PROGRESS NOTES
Daily Note     Today's date: 3/26/2021  Patient name: Charmaine Madden  : 1975  MRN: [id number]  Referring provider: Chrystal Norton MD  Dx:   Encounter Diagnosis     ICD-10-CM    1  Acute pain of right knee  M25 561                   Subjective: Better the past couple day  Objective: See treatment diary below  Performed KT-1000   15#, 20#, and active quad <3mm difference  30# and man max >3mm difference  Assessment: Tolerated treatment well  Patient would benefit from continued PT      Plan: Continue per plan of care        Precautions: None      Manuals 3/12 3/15 3/19 3/22 3/26        LC x10 minutes 10 minutes x10 minutes x10 minutes x10 minutes                                               Neuro Re-Ed                                                                                                        Ther Ex                          TKE 5# - 3x10 nt 7 5# 7 5# 7 5#        SLR flexion 2# - 2 sets nt 3# 4# 4#        Cybex knee flexion 5# - 3 sets 30# 30# 10# 10#        Cybex knee ext nt nt nt nt nt        Cybex leg press 30# - 3 sets 30# nt 50# 50#        Seated hamstring stretch nt nt nt nt nt                     Mini squat On 1/2 roll - 2x10 2x10 3x10 nt nt        Step ups  On BOSU 3x10 2x10 3x10        Biodex - squat and shift 3x10 1/2 star nt nt 3x10        Blue foam   Mini-squat nt nt nt        Resisted walking   backwards and sideways - x10 x10 completed        Physioball hamstring curls    2x10 2x10        Physioball - ball press     3x10                                                                         Ther Activity                                       Gait Training                                       Modalities             NMES with ice x15 minutes    nt

## 2021-03-29 ENCOUNTER — OFFICE VISIT (OUTPATIENT)
Dept: PHYSICAL THERAPY | Age: 46
End: 2021-03-29
Payer: COMMERCIAL

## 2021-03-29 DIAGNOSIS — M25.561 ACUTE PAIN OF RIGHT KNEE: Primary | ICD-10-CM

## 2021-03-29 PROCEDURE — 97112 NEUROMUSCULAR REEDUCATION: CPT | Performed by: PHYSICAL THERAPIST

## 2021-03-29 PROCEDURE — 97110 THERAPEUTIC EXERCISES: CPT | Performed by: PHYSICAL THERAPIST

## 2021-03-30 NOTE — PROGRESS NOTES
Daily Note     Today's date: 3/30/2021  Patient name: Dominique Saldana  : 1975  MRN: [id number]  Referring provider: Jimenez Small MD  Dx:   Encounter Diagnosis     ICD-10-CM    1  Acute pain of right knee  M25 561                   Subjective: Patient just from second opinion with Dr Luz Donahue  Opinion feels surgical reconstruction is appropriate  Objective: See treatment diary below      Assessment: Tolerated treatment well  Patient would benefit from continued PT      Plan: Continue per plan of care        Precautions: None      Manuals 3/12 3/15 3/19 3/22 3/26 3/30       LC x10 minutes 10 minutes x10 minutes x10 minutes x10 minutes x10 minutes                                              Neuro Re-Ed                                                                                                        Ther Ex                          TKE 5# - 3x10 nt 7 5# 7 5# 7 5# 7 5#       SLR flexion 2# - 2 sets nt 3# 4# 4# 4#       Cybex knee flexion 5# - 3 sets 30# 30# 10# 10# 10# - 3 sets       Cybex knee ext nt nt nt nt nt nt       Cybex leg press 30# - 3 sets 30# nt 50# 50# 50# - 3 sets       Seated hamstring stretch nt nt nt nt nt nt                    Mini squat On 1/2 roll - 2x10 2x10 3x10 nt nt completed       Step ups  On BOSU 3x10 2x10 3x10 nt       Biodex - squat and shift 3x10 1/2 star nt nt 3x10 completed       Blue foam   Mini-squat nt nt nt        Resisted walking   backwards and sideways - x10 x10 completed With sled       Physioball hamstring curls    2x10 2x10 completed       Physioball - ball press     3x10 nt       BOSU - single leg stance with ball catch      2x10                                                           Ther Activity                                       Gait Training                                       Modalities             NMES with ice x15 minutes    nt

## 2021-04-02 ENCOUNTER — OFFICE VISIT (OUTPATIENT)
Dept: PHYSICAL THERAPY | Age: 46
End: 2021-04-02
Payer: COMMERCIAL

## 2021-04-02 DIAGNOSIS — M25.561 ACUTE PAIN OF RIGHT KNEE: Primary | ICD-10-CM

## 2021-04-02 PROCEDURE — 97112 NEUROMUSCULAR REEDUCATION: CPT | Performed by: PHYSICAL THERAPIST

## 2021-04-02 PROCEDURE — 97110 THERAPEUTIC EXERCISES: CPT | Performed by: PHYSICAL THERAPIST

## 2021-04-02 NOTE — PROGRESS NOTES
Daily Note     Today's date: 2021  Patient name: Lavinia Paz  : 1975  MRN: [id number]  Referring provider: Yu Mayer MD  Dx: No diagnosis found  Subjective:  Knee feels better      Objective: See treatment diary below      Assessment: Tolerated treatment well  Patient would benefit from continued PT      Plan: Continue per plan of care        Precautions: None      Manuals 3/12 3/15 3/19 3/22 3/26 3/30 4/2      LC x10 minutes 10 minutes x10 minutes x10 minutes x10 minutes x10 minutes x10 random level 4                                             Neuro Re-Ed                                                                                                        Ther Ex                          TKE 5# - 3x10 nt 7 5# 7 5# 7 5# 7 5# 11 5#      SLR flexion 2# - 2 sets nt 3# 4# 4# 4# 4#      Cybex knee flexion 5# - 3 sets 30# 30# 10# 10# 10# - 3 sets 10#      Cybex knee ext nt nt nt nt nt nt 15# - eccentric lowering      Cybex leg press 30# - 3 sets 30# nt 50# 50# 50# - 3 sets 55# - 3 sets      Seated hamstring stretch nt nt nt nt nt nt nt                   Mini squat On 1/2 roll - 2x10 2x10 3x10 nt nt completed nt      Step ups  On BOSU 3x10 2x10 3x10 nt nt      Biodex - squat and shift 3x10 1/2 star nt nt 3x10 completed completed      Blue foam   Mini-squat nt nt nt        Resisted walking   backwards and sideways - x10 x10 completed With sled sideways - x10 each      Physioball hamstring curls    2x10 2x10 completed nt      Physioball - ball press     3x10 nt nt      BOSU - single leg stance with ball catch      2x10 No catch      Jumping on leg press       Double leg - 55# - 3 sets                                             Ther Activity                                       Gait Training                                       Modalities             NMES with ice x15 minutes    nt

## 2021-04-09 ENCOUNTER — OFFICE VISIT (OUTPATIENT)
Dept: PHYSICAL THERAPY | Age: 46
End: 2021-04-09
Payer: COMMERCIAL

## 2021-04-09 DIAGNOSIS — M25.561 ACUTE PAIN OF RIGHT KNEE: Primary | ICD-10-CM

## 2021-04-09 PROCEDURE — 97110 THERAPEUTIC EXERCISES: CPT | Performed by: SPECIALIST/TECHNOLOGIST

## 2021-04-09 NOTE — PROGRESS NOTES
Daily Note     Today's date: 2021  Patient name: Brenda Sheridan  : 1975  MRN: [id number]  Referring provider: Nael Atkinson MD  Dx:   Encounter Diagnosis     ICD-10-CM    1  Acute pain of right knee  M25 561                   Subjective: Pt reports improved RLE strength since starting PT  Pt states she is still apprehensive with CKC twisting motions and stepping down from a curb etc      Objective: See treatment diary below    Assessment: Good CKC stability noted with step ups on unstable surface (inverted BOSU), progress can likely be attributed to improved RLE strength  Pt requires intermittent VC to achieve TKE, pt states she is apprehensive vs limited by discomfort  Tolerated treatment well  Patient demonstrated fatigue post treatment, exhibited good technique with therapeutic exercises and would benefit from continued PT to improve RLE strength and stability with dynamic activities  Plan: Continue per plan of care  Progress treatment as tolerated  Precautions: None      Manuals 3/12 3/15 3/19 3/22 3/26 3/30 4/2 4/9     LC x10 minutes 10 minutes x10 minutes x10 minutes x10 minutes x10 minutes x10 random level 4 x10 random level 4                                            Neuro Re-Ed                                                                                                        Ther Ex                          TKE 5# - 3x10 nt 7 5# 7 5# 7 5# 7 5# 11 5# 11  5#3x10     SLR flexion 2# - 2 sets nt 3# 4# 4# 4# 4# 5# 3x10     Cybex knee flexion 5# - 3 sets 30# 30# 10# 10# 10# - 3 sets 10# 20# 3x10     Cybex knee ext nt nt nt nt nt nt 15# - eccentric lowering 15# 3x10     Cybex leg press 30# - 3 sets 30# nt 50# 50# 50# - 3 sets 55# - 3 sets 55# 3 sets     Seated hamstring stretch nt nt nt nt nt nt nt                   Mini squat On  roll - 2x10 2x10 3x10 nt nt completed nt      Step ups  On BOSU 3x10 2x10 3x10 nt nt Inverted BOSU 1HHA 3x10     Biodex - squat and shift 3x10  star nt nt 3x10 completed completed      Blue foam   Mini-squat nt nt nt        Resisted walking   backwards and sideways - x10 x10 completed With sled sideways - x10 each 15# x10 R/L seda, 50# 5x sled pull     Physioball hamstring curls    2x10 2x10 completed nt      Physioball - ball press     3x10 nt nt      BOSU - single leg stance with ball catch      2x10 No catch 4x30"     Jumping on leg press       Double leg - 55# - 3 sets Double leg - 55# - 3 sets                                            Ther Activity                                       Gait Training                                       Modalities             NMES with ice x15 minutes    nt

## 2021-04-12 ENCOUNTER — APPOINTMENT (OUTPATIENT)
Dept: PHYSICAL THERAPY | Age: 46
End: 2021-04-12
Payer: COMMERCIAL

## 2021-04-12 DIAGNOSIS — F41.9 ANXIETY: ICD-10-CM

## 2021-04-13 ENCOUNTER — OFFICE VISIT (OUTPATIENT)
Dept: PHYSICAL THERAPY | Age: 46
End: 2021-04-13
Payer: COMMERCIAL

## 2021-04-13 DIAGNOSIS — M25.561 ACUTE PAIN OF RIGHT KNEE: Primary | ICD-10-CM

## 2021-04-13 PROCEDURE — 97112 NEUROMUSCULAR REEDUCATION: CPT | Performed by: PHYSICAL THERAPIST

## 2021-04-13 PROCEDURE — 97110 THERAPEUTIC EXERCISES: CPT | Performed by: PHYSICAL THERAPIST

## 2021-04-13 RX ORDER — ALPRAZOLAM 0.25 MG/1
TABLET ORAL
Qty: 20 TABLET | Refills: 0 | Status: SHIPPED | OUTPATIENT
Start: 2021-04-13 | End: 2021-06-25 | Stop reason: SDUPTHER

## 2021-04-13 NOTE — PROGRESS NOTES
Daily Note     Today's date: 2021  Patient name: Barak Bcoanegra  : 1975  MRN: [id number]  Referring provider: Mayda Nixon MD  Dx:   Encounter Diagnosis     ICD-10-CM    1  Acute pain of right knee  M25 561                   Subjective: Tightness into full extension      Objective: See treatment diary below      Assessment: Tolerated treatment well  Patient would benefit from continued PT  Mechanical block vs swelling  Plan: Continue per plan of care  Precautions: None      Manuals 3/12 3/15 3/19 3/22 3/26 3/30 4/2 4/9 4/13    LC x10 minutes 10 minutes x10 minutes x10 minutes x10 minutes x10 minutes x10 random level 4 x10 random level 4 x10 minutes                                           Neuro Re-Ed                                                                                                        Ther Ex                          TKE 5# - 3x10 nt 7 5# 7 5# 7 5# 7 5# 11 5# 11  5#3x10 nt    SLR flexion 2# - 2 sets nt 3# 4# 4# 4# 4# 5# 3x10 nt    Cybex knee flexion 5# - 3 sets 30# 30# 10# 10# 10# - 3 sets 10# 20# 3x10 20#    Cybex knee ext nt nt nt nt nt nt 15# - eccentric lowering 15# 3x10 15#    Cybex leg press 30# - 3 sets 30# nt 50# 50# 50# - 3 sets 55# - 3 sets 55# 3 sets 55#    Seated hamstring stretch nt nt nt nt nt nt nt  nt                 Mini squat On 1/2 roll - 2x10 2x10 3x10 nt nt completed nt  On 1/2 roll    Step ups  On BOSU 3x10 2x10 3x10 nt nt Inverted BOSU 1HHA 3x10 completed    Biodex - squat and shift 3x10 1/2 star nt nt 3x10 completed completed      Blue foam   Mini-squat nt nt nt        Resisted walking   backwards and sideways - x10 x10 completed With sled sideways - x10 each 15# x10 R/L seda, 50# 5x sled pull completed    Physioball hamstring curls    2x10 2x10 completed nt  nt    Physioball - ball press     3x10 nt nt  nt    BOSU - single leg stance with ball catch      2x10 No catch 4x30" nt    Jumping on leg press       Double leg - 55# - 3 sets Double leg - 55# - 3 sets Single and double                                           Ther Activity                                       Gait Training                                       Modalities             NMES with ice x15 minutes    nt

## 2021-04-16 ENCOUNTER — APPOINTMENT (OUTPATIENT)
Dept: PHYSICAL THERAPY | Age: 46
End: 2021-04-16
Payer: COMMERCIAL

## 2021-04-21 ENCOUNTER — OFFICE VISIT (OUTPATIENT)
Dept: PHYSICAL THERAPY | Age: 46
End: 2021-04-21
Payer: COMMERCIAL

## 2021-04-21 DIAGNOSIS — M25.561 ACUTE PAIN OF RIGHT KNEE: Primary | ICD-10-CM

## 2021-04-21 PROCEDURE — 97112 NEUROMUSCULAR REEDUCATION: CPT | Performed by: PHYSICAL THERAPIST

## 2021-04-21 PROCEDURE — 97110 THERAPEUTIC EXERCISES: CPT | Performed by: PHYSICAL THERAPIST

## 2021-04-22 ENCOUNTER — OFFICE VISIT (OUTPATIENT)
Dept: OBGYN CLINIC | Facility: OTHER | Age: 46
End: 2021-04-22
Payer: COMMERCIAL

## 2021-04-22 VITALS
HEIGHT: 62 IN | WEIGHT: 118.8 LBS | SYSTOLIC BLOOD PRESSURE: 111 MMHG | BODY MASS INDEX: 21.86 KG/M2 | HEART RATE: 65 BPM | DIASTOLIC BLOOD PRESSURE: 74 MMHG

## 2021-04-22 DIAGNOSIS — S83.511D RUPTURE OF ANTERIOR CRUCIATE LIGAMENT OF RIGHT KNEE, SUBSEQUENT ENCOUNTER: ICD-10-CM

## 2021-04-22 DIAGNOSIS — S83.411D SPRAIN OF MEDIAL COLLATERAL LIGAMENT OF RIGHT KNEE, SUBSEQUENT ENCOUNTER: Primary | ICD-10-CM

## 2021-04-22 DIAGNOSIS — S83.281D TEAR OF LATERAL MENISCUS OF RIGHT KNEE, CURRENT, UNSPECIFIED TEAR TYPE, SUBSEQUENT ENCOUNTER: ICD-10-CM

## 2021-04-22 PROCEDURE — 99213 OFFICE O/P EST LOW 20 MIN: CPT | Performed by: ORTHOPAEDIC SURGERY

## 2021-04-22 NOTE — PROGRESS NOTES
Daily Note     Today's date: 2021  Patient name: Melida Franz  : 1975  MRN: [id number]  Referring provider: Lazarus Hake, MD  Dx:   Encounter Diagnosis     ICD-10-CM    1  Acute pain of right knee  M25 561                   Subjective: Patient feeling good  No complaints of instability noted  Decreasing swelling  Medial knee soreness persists      Objective: See treatment diary below      Assessment: Tolerated treatment well  Patient would benefit from continued PT      Plan: Continue per plan of care  Precautions: None      Manuals 3/12 3/15 3/19 3/22 3/26 3/30 4/2 4/9 4/13 4/21   LC x10 minutes 10 minutes x10 minutes x10 minutes x10 minutes x10 minutes x10 random level 4 x10 random level 4 x10 minutes x10 minutes                                          Neuro Re-Ed                                                                                                        Ther Ex                          TKE 5# - 3x10 nt 7 5# 7 5# 7 5# 7 5# 11 5# 11  5#3x10 nt 11 5#   SLR flexion 2# - 2 sets nt 3# 4# 4# 4# 4# 5# 3x10 nt 7 5#   Cybex knee flexion 5# - 3 sets 30# 30# 10# 10# 10# - 3 sets 10# 20# 3x10 20# 20#   Cybex knee ext nt nt nt nt nt nt 15# - eccentric lowering 15# 3x10 15# 15#   Cybex leg press 30# - 3 sets 30# nt 50# 50# 50# - 3 sets 55# - 3 sets 55# 3 sets 55# Single                             Mini squat On 1/ roll - 2x10 2x10 3x10 nt nt completed nt  On /2 roll On Bosu ball   Step ups  On BOSU 3x10 2x10 3x10 nt nt Inverted BOSU 1HHA 3x10 completed    Biodex - squat and shift 3x10 1/2 star nt nt 3x10 completed completed      Blue foam   Mini-squat nt nt nt        Resisted walking   backwards and sideways - x10 x10 completed With sled sideways - x10 each 15# x10 R/L seda, 50# 5x sled pull completed x10 each   Physioball hamstring curls    2x10 2x10 completed nt  nt    Physioball - ball press     3x10 nt nt  nt    BOSU - single leg stance with ball catch      2x10 No catch 4x30" nt    Jumping on leg press       Double leg - 55# - 3 sets Double leg - 55# - 3 sets Single and double 55# - 3x10 each   Jogging on tramp         3x1min    Hex bar squats         3x10                 Ther Activity                                       Gait Training                                       Modalities             NMES with ice x15 minutes    nt

## 2021-04-22 NOTE — PROGRESS NOTES
Assessment  Diagnoses and all orders for this visit:    Sprain of medial collateral ligament of right knee, subsequent encounter    Rupture of anterior cruciate ligament of right knee, subsequent encounter    Tear of lateral meniscus of right knee, current, unspecified tear type, subsequent encounter        Discussion and Plan:     the patient appears to be recovering nicely from this injury and I do feel based on her examination today that her injury is consistent with a partial ACL tear and she is compensating for that nicely  She does not appear to be significantly symptomatic with her lateral meniscus anterior horn tear and her MCL is healed nicely  At this time I recommend she progress with functional rehabilitation and progression back to normal activities over the next 48 weeks  We are having her fitted for a functional ACL brace at that has not been performed already and feel that is a brace that may help her return to her activities with more confidence although it is not absolute required for her to return to those activities  If she develops symptoms specifically instability then we would could reconsider evaluation and ACL reconstruction but at this time I do not feel that is appropriate and the patient agrees  See her back on an as-needed basis    Subjective:   Patient ID: Tiburcio Scheuermann is a 54 y o  female      Patient returns for follow-up of her right knee partial ACL injury with MCL and anterior horn lateral meniscus tear  Patient is 2 months out from injury and is doing quite well in physical therapy, does not complain of significant instability and is about to progress towards agility  training and functional rehab  She does states she has some soreness and "tightness "over her medial aspect of the knee which is improving and is still attending therapy 2 times a week  No significant pain or locking or catching at this time            The following portions of the patient's history were reviewed and updated as appropriate: allergies, current medications, past family history, past medical history, past social history, past surgical history and problem list     Review of Systems   Constitutional: Negative for chills and fever  HENT: Negative for hearing loss  Eyes: Negative for visual disturbance  Respiratory: Negative for shortness of breath  Cardiovascular: Negative for chest pain  Gastrointestinal: Negative for abdominal pain  Musculoskeletal:        As reviewed in the HPI   Skin: Negative for rash  Neurological:        As reviewed in the HPI   Psychiatric/Behavioral: Negative for agitation  Objective:  /74   Pulse 65   Ht 5' 2" (1 575 m)   Wt 53 9 kg (118 lb 12 8 oz)   BMI 21 73 kg/m²       Right Knee Exam     Tenderness   The patient is experiencing tenderness in the lateral joint line  Range of Motion   The patient has normal right knee ROM  Tests   Víctor:  Medial - negative Lateral - negative  Varus: negative Valgus: negative  Lachman:  Anterior - trace      Drawer:  Posterior - negative  Patellar apprehension: negative    Other   Erythema: absent  Scars: absent  Sensation: normal  Pulse: present  Swelling: none  Effusion: no effusion present    Comments:   Very slightly positive Lachman on the right compared to the left translating about 2 mm     pain this gapping with valgus stress of about 1 mm equivalent to unaffected left knee            Physical Exam  Vitals signs and nursing note reviewed  Constitutional:       Appearance: She is well-developed  HENT:      Head: Normocephalic and atraumatic  Neck:      Musculoskeletal: Normal range of motion and neck supple  Cardiovascular:      Rate and Rhythm: Normal rate and regular rhythm  Pulmonary:      Effort: Pulmonary effort is normal  No respiratory distress  Abdominal:      General: There is no distension  Palpations: Abdomen is soft     Musculoskeletal:      Right knee: She exhibits no effusion  Skin:     General: Skin is warm and dry  Neurological:      Mental Status: She is alert and oriented to person, place, and time     Psychiatric:         Behavior: Behavior normal

## 2021-04-26 ENCOUNTER — OFFICE VISIT (OUTPATIENT)
Dept: PHYSICAL THERAPY | Age: 46
End: 2021-04-26
Payer: COMMERCIAL

## 2021-04-26 DIAGNOSIS — M25.561 ACUTE PAIN OF RIGHT KNEE: Primary | ICD-10-CM

## 2021-04-26 PROCEDURE — 97112 NEUROMUSCULAR REEDUCATION: CPT | Performed by: PHYSICAL THERAPIST

## 2021-04-26 PROCEDURE — 97110 THERAPEUTIC EXERCISES: CPT | Performed by: PHYSICAL THERAPIST

## 2021-04-27 NOTE — PROGRESS NOTES
Daily Note     Today's date: 2021  Patient name: Danyel Sanchez  : 1975  MRN: [id number]  Referring provider: Ava Yuan MD  Dx:   Encounter Diagnosis     ICD-10-CM    1  Acute pain of right knee  M25 561                   Subjective: Patient continues to progress  Objective: See treatment diary below      Assessment: Tolerated treatment well  Patient would benefit from continued PT      Plan: Continue per plan of care        Precautions: None      Manuals 4/26 3/15 3/19 3/22 3/26 3/30 4/2 4/9 4/13 4/21   LC x10 minutes 10 minutes x10 minutes x10 minutes x10 minutes x10 minutes x10 random level 4 x10 random level 4 x10 minutes x10 minutes                                          Neuro Re-Ed                                                                                                        Ther Ex                                                    Cybex knee flexion 30# - 3 sets 30# 30# 10# 10# 10# - 3 sets 10# 20# 3x10 20# 20#   Cybex knee ext 15# - 3 sets nt nt nt nt nt 15# - eccentric lowering 15# 3x10 15# 15#   Cybex leg press 30# - 3 sets 30# nt 50# 50# 50# - 3 sets 55# - 3 sets 55# 3 sets 55# Single                             Mini squat On / roll - 2x10 2x10 3x10 nt nt completed nt  On / roll On Bosu ball   Step ups  On BOSU 3x10 2x10 3x10 nt nt Inverted BOSU 1HHA 3x10 completed    Biodex - squat and shift nt /2 star nt nt 3x10 completed completed      Blue foam   Mini-squat nt nt nt        Resisted walking   backwards and sideways - x10 x10 completed With sled sideways - x10 each 15# x10 R/L seda, 50# 5x sled pull completed x10 each   Physioball hamstring curls nt   2x10 2x10 completed nt  nt                 BOSU - single leg stance with ball catch nt     2x10 No catch 4x30" nt    Jumping on leg press Single and Double - 3 sets      Double leg - 55# - 3 sets Double leg - 55# - 3 sets Single and double 55# - 3x10 each   Jogging on tramp Trampoline - x5         3x1min Hex bar squats 3x15        3x10                 Ther Activity                                       Gait Training                                       Modalities

## 2021-04-28 DIAGNOSIS — J01.90 ACUTE NON-RECURRENT SINUSITIS, UNSPECIFIED LOCATION: ICD-10-CM

## 2021-04-29 RX ORDER — FLUTICASONE PROPIONATE 50 MCG
SPRAY, SUSPENSION (ML) NASAL
Qty: 16 G | Refills: 0 | Status: SHIPPED | OUTPATIENT
Start: 2021-04-29 | End: 2021-06-25 | Stop reason: SDUPTHER

## 2021-04-30 ENCOUNTER — OFFICE VISIT (OUTPATIENT)
Dept: PHYSICAL THERAPY | Age: 46
End: 2021-04-30
Payer: COMMERCIAL

## 2021-04-30 DIAGNOSIS — M25.561 ACUTE PAIN OF RIGHT KNEE: Primary | ICD-10-CM

## 2021-04-30 PROCEDURE — 97112 NEUROMUSCULAR REEDUCATION: CPT | Performed by: PHYSICAL THERAPIST

## 2021-04-30 PROCEDURE — 97110 THERAPEUTIC EXERCISES: CPT | Performed by: PHYSICAL THERAPIST

## 2021-05-01 NOTE — PROGRESS NOTES
Daily Note     Today's date: 2021  Patient name: Eli Farias  : 1975  MRN: [id number]  Referring provider: Uma Barksdale MD  Dx:   Encounter Diagnosis     ICD-10-CM    1  Acute pain of right knee  M25 561                   Subjective: Nothing       Objective: See treatment diary below      Assessment: Tolerated treatment well  Patient would benefit from continued PT      Plan: Continue per plan of care        Precautions: None      Manuals 4/26 4/30 3/19 3/22 3/26 3/30 4/2 4/9 4/13 4/21   LC x10 minutes 10 minutes x10 minutes x10 minutes x10 minutes x10 minutes x10 random level 4 x10 random level 4 x10 minutes x10 minutes                                          Neuro Re-Ed                                                                                                        Ther Ex                                                    Cybex knee flexion 30# - 3 sets 30# 30# 10# 10# 10# - 3 sets 10# 20# 3x10 20# 20#   Cybex knee ext 15# - 3 sets 15# -3 sets nt nt nt nt 15# - eccentric lowering 15# 3x10 15# 15#   Cybex leg press 30# - 3 sets 30# nt 50# 50# 50# - 3 sets 55# - 3 sets 55# 3 sets 55# Single                             Mini squat On  roll - 2x10 2x10 3x10 nt nt completed nt  On  roll On Bosu ball   Step ups  On BOSU 3x10 2x10 3x10 nt nt Inverted BOSU 1HHA 3x10 completed    Biodex - squat and shift nt /2 star nt nt 3x10 completed completed      Blue foam   Mini-squat nt nt nt        Resisted walking   backwards and sideways - x10 x10 completed With sled sideways - x10 each 15# x10 R/L seda, 50# 5x sled pull completed x10 each   Physioball hamstring curls nt   2x10 2x10 completed nt  nt                 BOSU - single leg stance with ball catch nt     2x10 No catch 4x30" nt    Jumping on leg press Single and Double - 3 sets 3 sets     Double leg - 55# - 3 sets Double leg - 55# - 3 sets Single and double 55# - 3x10 each   Jogging on tramp Trampoline - x5  x5       3x1min    Hex bar squats 3x15 3x15       3x10                 Ther Activity                                       Gait Training                                       Modalities

## 2021-05-03 ENCOUNTER — OFFICE VISIT (OUTPATIENT)
Dept: PHYSICAL THERAPY | Age: 46
End: 2021-05-03
Payer: COMMERCIAL

## 2021-05-03 DIAGNOSIS — M25.561 ACUTE PAIN OF RIGHT KNEE: Primary | ICD-10-CM

## 2021-05-03 PROCEDURE — 97110 THERAPEUTIC EXERCISES: CPT | Performed by: SPECIALIST/TECHNOLOGIST

## 2021-05-03 NOTE — PROGRESS NOTES
Daily Note     Today's date: 5/3/2021  Patient name: Melida Franz  : 1975  MRN: [id number]  Referring provider: Lazarus Hake, MD  Dx:   Encounter Diagnosis     ICD-10-CM    1  Acute pain of right knee  M25 561                   Subjective: Pt reports her knee feels really good today, "probably the best it's been"  Objective: See treatment diary below    Assessment: Excellent functional strength gains with dynamic activities and CKC stability noted with balance therex  Tolerated treatment well  Patient demonstrated fatigue post treatment, exhibited good technique with therapeutic exercises and would benefit from continued PT to improve strength and dynamic stability of RLE  Plan: Continue per plan of care  Progress treatment as tolerated         Precautions: None      Manuals 4/26 4/30 5/3    4/2 4/9 4/13 4/21   LC x10 minutes 10 minutes 10'    x10 random level 4 x10 random level 4 x10 minutes x10 minutes                                          Neuro Re-Ed                                                                                                        Ther Ex                                                    Cybex knee flexion 30# - 3 sets 30#     10# 20# 3x10 20# 20#   Cybex knee ext 15# - 3 sets 15# -3 sets 20# 3x10    15# - eccentric lowering 15# 3x10 15# 15#   Cybex leg press 30# - 3 sets 30# 30# 3x10    55# - 3 sets 55# 3 sets 55# Single                             Mini squat On 1/2 roll - 2x10 2x10     nt  On 1/2 roll On Bosu ball   Step ups  On BOSU On inverted bosu 3x10    nt Inverted BOSU 1HHA 3x10 completed    Biodex - squat and shift nt 1/2 star Star excursion foam 10x    completed      Blue foam   Mini-squat           Resisted walking   100# sed pull 5x    sideways - x10 each 15# x10 R/L seda, 50# 5x sled pull completed x10 each   Physioball hamstring curls nt      nt  nt                 BOSU - single leg stance with ball catch nt  30 tosses 2x     No catch 4x30" nt Jumping on leg press Single and Double - 3 sets 3 sets 3sets 60# 30#    Double leg - 55# - 3 sets Double leg - 55# - 3 sets Single and double 55# - 3x10 each   Jogging on tramp Trampoline - x5  x5 5x1 min      3x1min    Hex bar squats 3x15 3x15 3x15      3x10                 Ther Activity                                       Gait Training                                       Modalities

## 2021-05-07 ENCOUNTER — OFFICE VISIT (OUTPATIENT)
Dept: PHYSICAL THERAPY | Age: 46
End: 2021-05-07
Payer: COMMERCIAL

## 2021-05-07 DIAGNOSIS — M25.561 ACUTE PAIN OF RIGHT KNEE: Primary | ICD-10-CM

## 2021-05-07 PROCEDURE — 97112 NEUROMUSCULAR REEDUCATION: CPT | Performed by: PHYSICAL THERAPIST

## 2021-05-07 PROCEDURE — 97110 THERAPEUTIC EXERCISES: CPT | Performed by: PHYSICAL THERAPIST

## 2021-05-07 NOTE — PROGRESS NOTES
Daily Note     Today's date: 2021  Patient name: Maurice Skinner  : 1975  MRN: [id number]  Referring provider: Abbey Heck MD  Dx:   Encounter Diagnosis     ICD-10-CM    1  Acute pain of right knee  M25 561                   Subjective: Feeling good  Objective: See treatment diary below      Assessment: Tolerated treatment well  Patient would benefit from continued PT      Plan: Continue per plan of care        Precautions: None      Manuals 4/26 4/30 5/3 5/7   4/2 4/9 4/13 4/21   LC x10 minutes 10 minutes 10' x10   x10 random level 4 x10 random level 4 x10 minutes x10 minutes                                          Neuro Re-Ed                                                                                                        Ther Ex                                                    Cybex knee flexion 30# - 3 sets 30#  20#   10# 20# 3x10 20# 20#   Cybex knee ext 15# - 3 sets 15# -3 sets 20# 3x10 25#   15# - eccentric lowering 15# 3x10 15# 15#   Cybex leg press 30# - 3 sets 30# 30# 3x10 60#   55# - 3 sets 55# 3 sets 55# Single                             Mini squat On 1/2 roll - 2x10 2x10     nt  On  roll On Bosu ball   Step ups  On BOSU On inverted bosu 3x10 2x15   nt Inverted BOSU 1HHA 3x10 completed    Biodex - squat and shift nt / star Star excursion foam 10x 2x15   completed      Blue foam   Mini-squat           Resisted walking   100# sed pull 5x    sideways - x10 each 15# x10 R/L seda, 50# 5x sled pull completed x10 each   Physioball hamstring curls nt      nt  nt                 BOSU - single leg stance with ball catch nt  30 tosses 2x  x30   No catch 4x30" nt    Jumping on leg press Single and Double - 3 sets 3 sets 3sets 60# 30# 3 sets   Double leg - 55# - 3 sets Double leg - 55# - 3 sets Single and double 55# - 3x10 each   Jogging on tramp Trampoline - x5  x5 5x1 min x5     3x1min    Hex bar squats 3x15 3x15 3x15 20#     3x10                 Ther Activity Gait Training                                       Modalities

## 2021-05-10 ENCOUNTER — OFFICE VISIT (OUTPATIENT)
Dept: PHYSICAL THERAPY | Age: 46
End: 2021-05-10
Payer: COMMERCIAL

## 2021-05-10 DIAGNOSIS — M25.561 ACUTE PAIN OF RIGHT KNEE: Primary | ICD-10-CM

## 2021-05-10 PROCEDURE — 97110 THERAPEUTIC EXERCISES: CPT | Performed by: SPECIALIST/TECHNOLOGIST

## 2021-05-10 NOTE — PROGRESS NOTES
Daily Note     Today's date: 5/10/2021  Patient name: Lavinia Paz  : 1975  MRN: [id number]  Referring provider: Yu Mayer MD  Dx:   Encounter Diagnosis     ICD-10-CM    1  Acute pain of right knee  M25 561                   Subjective: Pt reports her R knee is feeling good, mowed grass recently without any issues  Objective: See treatment diary below    Assessment: Pt tolerated therex added this visit well- good utilization of visual feedback to improve form with dynamic CKC therex  Excellent stability on unstable surfaces with random pertebations  Tolerated treatment well  Patient demonstrated fatigue post treatment, exhibited good technique with therapeutic exercises and would benefit from continued PT    Plan: Continue per plan of care  Progress treatment as tolerated         Precautions: None      Manuals 4/26 4/30 5/3 5/7 5/10        LC x10 minutes 10 minutes 10' x10 Elliptical 10'                                               Neuro Re-Ed                                                                                                        Ther Ex                                                    Cybex knee flexion 30# - 3 sets 30#  20#         Cybex knee ext 15# - 3 sets 15# -3 sets 20# 3x10 25#         Cybex leg press 30# - 3 sets 30# 30# 3x10 60#                                   Mini squat On /2 roll - 2x10 2x10   SL to plinth 3x10 15#        Step ups  On BOSU On inverted bosu 3x10 2x15         Biodex - squat and shift nt / star Star excursion foam 10x 2x15 Star Excursion firm 5x3         Blue foam   Mini-squat           Resisted walking   100# sed pull 5x          Physioball hamstring curls nt                         BOSU - single leg stance with ball catch nt  30 tosses 2x  x30 Inverted bosu w pertubations 3x30s        Jumping on leg press Single and Double - 3 sets 3 sets 3sets 60# 30# 3 sets         Jogging on tramp Trampoline - x5  x5 5x1 min x5         Hex bar squats 3x15 3x15 3x15 20#         Lunges     6x 25ft        Split Squat     3x10        Step to firm SL w med ball rebounder     20x                                                             Ther Activity                                       Gait Training                                       Modalities

## 2021-05-14 ENCOUNTER — OFFICE VISIT (OUTPATIENT)
Dept: PHYSICAL THERAPY | Age: 46
End: 2021-05-14
Payer: COMMERCIAL

## 2021-05-14 DIAGNOSIS — M25.561 ACUTE PAIN OF RIGHT KNEE: Primary | ICD-10-CM

## 2021-05-14 PROCEDURE — 97110 THERAPEUTIC EXERCISES: CPT | Performed by: SPECIALIST/TECHNOLOGIST

## 2021-05-14 NOTE — PROGRESS NOTES
Daily Note     Today's date: 2021  Patient name: Magda Matamoros  : 1975  MRN: [id number]  Referring provider: Mukesh Gautam MD  Dx:   Encounter Diagnosis     ICD-10-CM    1  Acute pain of right knee  M25 561                   Subjective: Pt denies knee soreness/swelling following previous tx sessions with exercise variations that were introduced  Objective: See treatment diary below    Assessment: Good stability with dynamic progressions  Tolerated treatment well  Patient demonstrated fatigue post treatment, exhibited good technique with therapeutic exercises and would benefit from continued PT    Plan: Continue per plan of care  Progress treatment as tolerated            Precautions: None    Manuals 4/26 4/30 5/3 5/7 5/10 5/14       LC x10 minutes 10 minutes 10' x10 Elliptical 10' Bike 10'                                              Neuro Re-Ed                                                                                                        Ther Ex                                                    Cybex knee flexion 30# - 3 sets 30#  20#         Cybex knee ext 15# - 3 sets 15# -3 sets 20# 3x10 25# 15x 15# SL w 3 SAQ each rep 15x 15# SL w 3 SAQ each rep       Cybex leg press 30# - 3 sets 30# 30# 3x10 60#                                   Mini squat On 1/2 roll - 2x10 2x10   SL to plinth 3x10 15# SL to plinth 3x10 15#       Step ups  On BOSU On inverted bosu 3x10 2x15  On inverted BOSU 3x15       Biodex - squat and shift nt 1/2 star Star excursion foam 10x 2x15 Star Excursion firm 5x3  Star foam 10x       Blue foam   Mini-squat           Resisted walking   100# sed pull 5x          Physioball hamstring curls nt     3x15                    BOSU - single leg stance with ball catch nt  30 tosses 2x  x30 Inverted bosu w pertubations 3x30s Inverted BOSU w        Jumping on leg press Single and Double - 3 sets 3 sets 3sets 60# 30# 3 sets         Jogging on tramp Trampoline - x5  x5 5x1 min x5         Hex bar squats 3x15 3x15 3x15 20#         Lunges     6x 25ft 6x 25ft       Split Squat     3x10 3x10       Step to firm SL w med ball rebounder     20x  3x10        Walking hops      3x                                              Ther Activity                                       Gait Training                                       Modalities

## 2021-05-17 ENCOUNTER — OFFICE VISIT (OUTPATIENT)
Dept: PHYSICAL THERAPY | Age: 46
End: 2021-05-17
Payer: COMMERCIAL

## 2021-05-17 DIAGNOSIS — M25.561 ACUTE PAIN OF RIGHT KNEE: Primary | ICD-10-CM

## 2021-05-17 PROCEDURE — 97110 THERAPEUTIC EXERCISES: CPT | Performed by: PHYSICAL THERAPIST

## 2021-05-17 PROCEDURE — 97112 NEUROMUSCULAR REEDUCATION: CPT | Performed by: PHYSICAL THERAPIST

## 2021-05-18 NOTE — PROGRESS NOTES
Daily Note     Today's date: 2021  Patient name: Sumeet Crespo  : 1975  MRN: [id number]  Referring provider: Kathy Mesa MD  Dx:   Encounter Diagnosis     ICD-10-CM    1  Acute pain of right knee  M25 561                   Subjective: Guarded movements with dynamic activities persist      Objective: See treatment diary below      Assessment: Tolerated treatment well  Patient would benefit from continued PT      Plan: Continue per plan of care        Precautions: None    Manuals  5/3 5/7 5/10 5/14 5/17      LC x10 minutes 10 minutes 10' x10 Elliptical 10' Bike 10' x10 minutes                                             Neuro Re-Ed                                                                                                        Ther Ex                                                    Cybex knee flexion 30# - 3 sets 30#  20#   20# - 3x10      Cybex knee ext 15# - 3 sets 15# -3 sets 20# 3x10 25# 15x 15# SL w 3 SAQ each rep 15x 15# SL w 3 SAQ each rep nt      Cybex leg press 30# - 3 sets 30# 30# 3x10 60#   60# SL                                Mini squat On 1/2 roll - 2x10 2x10   SL to plinth 3x10 15# SL to plinth 3x10 15# nt      Step ups  On BOSU On inverted bosu 3x10 2x15  On inverted BOSU 3x15 completed      Biodex - squat and shift nt 1/2 star Star excursion foam 10x 2x15 Star Excursion firm 5x3  Star foam 10x       Blue foam   Mini-squat           Resisted walking   100# sed pull 5x          Physioball hamstring curls nt     3x15 nt                   BOSU - single leg stance with ball catch nt  30 tosses 2x  x30 Inverted bosu w pertubations 3x30s Inverted BOSU w  completed      Jumping on leg press Single and Double - 3 sets 3 sets 3sets 60# 30# 3 sets   completed      Jogging on tramp Trampoline - x5  x5 5x1 min x5   completed      Hex bar squats 3x15 3x15 3x15 20#   nt      Lunges     6x 25ft 6x 25ft With yellow bar      Split Squat     3x10 3x10 3x10      Step to firm SL w med ball rebounder     20x  3x10  3x10      Walking hops      3x nt                                             Ther Activity                                       Gait Training                                       Modalities

## 2021-05-21 ENCOUNTER — OFFICE VISIT (OUTPATIENT)
Dept: PHYSICAL THERAPY | Age: 46
End: 2021-05-21
Payer: COMMERCIAL

## 2021-05-21 DIAGNOSIS — M25.561 ACUTE PAIN OF RIGHT KNEE: Primary | ICD-10-CM

## 2021-05-21 PROCEDURE — 97110 THERAPEUTIC EXERCISES: CPT | Performed by: PHYSICAL THERAPIST

## 2021-05-21 NOTE — PROGRESS NOTES
Daily Note     Today's date: 2021  Patient name: Carol Tobin  : 1975  MRN: [id number]  Referring provider: Jeyson Hannah MD  Dx:   Encounter Diagnosis     ICD-10-CM    1  Acute pain of right knee  M25 561                   Subjective: No compaints      Objective: See treatment diary below      Assessment: Tolerated treatment well  Patient would benefit from continued PT      Plan: Continue per plan of care        Precautions: None    Manuals 4/26 4/30 5/3 5/7 5/10 5/14 5/17 5/21     LC x10 minutes 10 minutes 10' x10 Elliptical 10' Bike 10' x10 minutes x10 minutes                                            Neuro Re-Ed                                                                                                        Ther Ex                                                    Cybex knee flexion 30# - 3 sets 30#  20#   20# - 3x10 20#     Cybex knee ext 15# - 3 sets 15# -3 sets 20# 3x10 25# 15x 15# SL w 3 SAQ each rep 15x 15# SL w 3 SAQ each rep nt nt     Cybex leg press 30# - 3 sets 30# 30# 3x10 60#   60# SL 60#                               Mini squat On /2 roll - 2x10 2x10   SL to plinth 3x10 15# SL to plinth 3x10 15# nt nt     Step ups  On BOSU On inverted bosu 3x10 2x15  On inverted BOSU 3x15 completed completed     Biodex - squat and shift nt 1/2 star Star excursion foam 10x 2x15 Star Excursion firm 5x3  Star foam 10x       Blue foam   Mini-squat           Resisted walking   100# sed pull 5x          Physioball hamstring curls nt     3x15 nt                   BOSU - single leg stance with ball catch nt  30 tosses 2x  x30 Inverted bosu w pertubations 3x30s Inverted BOSU w  completed      Jumping on leg press Single and Double - 3 sets 3 sets 3sets 60# 30# 3 sets   completed completed     Jogging on tramp Trampoline - x5  x5 5x1 min x5   completed completed     Hex bar squats 3x15 3x15 3x15 20#   nt      Lunges     6x 25ft 6x 25ft With yellow bar completed     Split Squat     3x10 3x10 3x10 3x10     Step to firm SL w med ball rebounder     20x  3x10  3x10 3x10     Walking hops      3x nt                                             Ther Activity                                       Gait Training                                       Modalities

## 2021-06-04 ENCOUNTER — OFFICE VISIT (OUTPATIENT)
Dept: PHYSICAL THERAPY | Age: 46
End: 2021-06-04
Payer: COMMERCIAL

## 2021-06-04 DIAGNOSIS — M25.561 ACUTE PAIN OF RIGHT KNEE: Primary | ICD-10-CM

## 2021-06-04 PROCEDURE — 97110 THERAPEUTIC EXERCISES: CPT | Performed by: PHYSICAL THERAPIST

## 2021-06-04 PROCEDURE — 97112 NEUROMUSCULAR REEDUCATION: CPT | Performed by: PHYSICAL THERAPIST

## 2021-06-04 NOTE — PROGRESS NOTES
Daily Note     Today's date: 2021  Patient name: Osiris Mitchell  : 1975  MRN: [id number]  Referring provider: Kale Ortiz MD  Dx:   Encounter Diagnosis     ICD-10-CM    1  Acute pain of right knee  M25 561                   Subjective: Feeling good      Objective: See treatment diary below      Assessment: Tolerated treatment well  Patient would benefit from continued PT      Plan: Continue per plan of care        Precautions: None    Manuals  5/3 5/7 5/10 5/14 5/17 5/21 6/4    LC x10 minutes 10 minutes 10' x10 Elliptical 10' Bike 10' x10 minutes x10 minutes x10 minutes                                           Neuro Re-Ed                                                                                                        Ther Ex                                                    Cybex knee flexion 30# - 3 sets 30#  20#   20# - 3x10 20# nt    Cybex knee ext 15# - 3 sets 15# -3 sets 20# 3x10 25# 15x 15# SL w 3 SAQ each rep 15x 15# SL w 3 SAQ each rep nt nt 3 sets    Cybex leg press 30# - 3 sets 30# 30# 3x10 60#   60# SL 60# nt                              Mini squat On  roll - 2x10 2x10   SL to plinth 3x10 15# SL to plinth 3x10 15# nt nt nt    Step ups  On BOSU On inverted bosu 3x10 2x15  On inverted BOSU 3x15 completed completed 2x15    Biodex - squat and shift nt / star Star excursion foam 10x 2x15 Star Excursion firm 5x3  Star foam 10x   2x15    Blue foam   Mini-squat           Resisted walking   100# sed pull 5x          Physioball hamstring curls nt     3x15 nt                   BOSU - single leg stance with ball catch nt  30 tosses 2x  x30 Inverted bosu w pertubations 3x30s Inverted BOSU w  completed  completed    Jumping on leg press Single and Double - 3 sets 3 sets 3sets 60# 30# 3 sets   completed completed completed    Jogging on tramp Trampoline - x5  x5 5x1 min x5   completed completed completed    Hex bar squats 3x15 3x15 3x15 20#   nt      Lunges     6x 25ft 6x 25ft With yellow bar completed x5 lengths    Split Squat     3x10 3x10 3x10 3x10 3x10    Step to firm SL w med ball rebounder     20x  3x10  3x10 3x10 3x10    Walking hops      3x nt                                             Ther Activity                                       Gait Training                                       Modalities

## 2021-06-07 ENCOUNTER — OFFICE VISIT (OUTPATIENT)
Dept: PHYSICAL THERAPY | Age: 46
End: 2021-06-07
Payer: COMMERCIAL

## 2021-06-07 DIAGNOSIS — M25.561 ACUTE PAIN OF RIGHT KNEE: Primary | ICD-10-CM

## 2021-06-07 PROCEDURE — 97110 THERAPEUTIC EXERCISES: CPT | Performed by: PHYSICAL THERAPIST

## 2021-06-08 NOTE — PROGRESS NOTES
Daily Note     Today's date: 2021  Patient name: Carol Tobin  : 1975  MRN: [id number]  Referring provider: Jeyson Hannah MD  Dx:   Encounter Diagnosis     ICD-10-CM    1  Acute pain of right knee  M25 561                   Subjective: Patient reports knee is feeling good  Objective: See treatment diary below      Assessment: Tolerated treatment well  Patient would benefit from continued PT      Plan: Continue per plan of care        Precautions: None    Manuals  5/3 5/7 5/10 5/14 5/17 5/21 6/4 6   LC x10 minutes 10 minutes 10' x10 Elliptical 10' Bike 10' x10 minutes x10 minutes x10 minutes x10 minutes                                          Neuro Re-Ed                                                                                                        Ther Ex                                                    Cybex knee flexion 30# - 3 sets 30#  20#   20# - 3x10 20# nt nt   Cybex knee ext 15# - 3 sets 15# -3 sets 20# 3x10 25# 15x 15# SL w 3 SAQ each rep 15x 15# SL w 3 SAQ each rep nt nt 3 sets nt   Cybex leg press 30# - 3 sets 30# 30# 3x10 60#   60# SL 60# nt nt                             Mini squat On  roll - 2x10 2x10   SL to plinth 3x10 15# SL to plinth 3x10 15# nt nt nt nt   Step ups  On BOSU On inverted bosu 3x10 2x15  On inverted BOSU 3x15 completed completed 2x15 3x10 - jump   Biodex - squat and shift nt / star Star excursion foam 10x 2x15 Star Excursion firm 5x3  Star foam 10x   2x15 Squat and hold level 1   Blue foam   Mini-squat        With opp foot reach   Resisted walking   100# sed pull 5x       Quick posterior - 2x10   Physioball hamstring curls nt     3x15 nt   nt                BOSU - single leg stance with ball catch nt  30 tosses 2x  x30 Inverted bosu w pertubations 3x30s Inverted BOSU w  completed  completed With catch   Jumping on leg press Single and Double - 3 sets 3 sets 3sets 60# 30# 3 sets   completed completed completed nt   Jogging on tramp Trampoline - x5  x5 5x1 min x5   completed completed completed nt   Hex bar squats 3x15 3x15 3x15 20#   nt   3x10   Lunges     6x 25ft 6x 25ft With yellow bar completed x5 lengths x5 lengths   Split Squat     3x10 3x10 3x10 3x10 3x10 nt   Step to firm SL w med ball rebounder     20x  3x10  3x10 3x10 3x10 nt   Walking hops      3x nt   nt                                          Ther Activity                                       Gait Training                                       Modalities

## 2021-06-11 ENCOUNTER — APPOINTMENT (OUTPATIENT)
Dept: PHYSICAL THERAPY | Age: 46
End: 2021-06-11
Payer: COMMERCIAL

## 2021-06-14 ENCOUNTER — OFFICE VISIT (OUTPATIENT)
Dept: PHYSICAL THERAPY | Age: 46
End: 2021-06-14
Payer: COMMERCIAL

## 2021-06-14 DIAGNOSIS — M25.561 ACUTE PAIN OF RIGHT KNEE: Primary | ICD-10-CM

## 2021-06-14 PROCEDURE — 97112 NEUROMUSCULAR REEDUCATION: CPT | Performed by: PHYSICAL THERAPIST

## 2021-06-14 PROCEDURE — 97110 THERAPEUTIC EXERCISES: CPT | Performed by: PHYSICAL THERAPIST

## 2021-06-15 NOTE — PROGRESS NOTES
Daily Note     Today's date: 2021  Patient name: Jeb Gloria  : 1975  MRN: [id number]  Referring provider: Susan Castro MD  Dx:   Encounter Diagnosis     ICD-10-CM    1  Acute pain of right knee  M25 561                   Subjective: Some lateral joint line pain noted  Objective: See treatment diary below      Assessment: Tolerated treatment well  Patient would benefit from continued PT      Plan: Continue per plan of care        Precautions: None    Manuals 6/14 4/30 5/3 5/7 5/10 5/14 5/17 5/21 6/4 6/7   LC x10 minutes 10 minutes 10' x10 Elliptical 10' Bike 10' x10 minutes x10 minutes x10 minutes x10 minutes                                          Neuro Re-Ed                                                                                                        Ther Ex                                                    Cybex knee flexion 30# - 3 sets 30#  20#   20# - 3x10 20# nt nt   Cybex knee ext 15# - 3 sets 15# -3 sets 20# 3x10 25# 15x 15# SL w 3 SAQ each rep 15x 15# SL w 3 SAQ each rep nt nt 3 sets nt   Cybex leg press 30# - 3 sets 30# 30# 3x10 60#   60# SL 60# nt nt                             Mini squat On  roll - 2x10 2x10   SL to plinth 3x10 15# SL to plinth 3x10 15# nt nt nt nt   Step ups  On BOSU On inverted bosu 3x10 2x15  On inverted BOSU 3x15 completed completed 2x15 3x10 - jump   Biodex - squat and shift nt / star Star excursion foam 10x 2x15 Star Excursion firm 5x3  Star foam 10x   2x15 Squat and hold level 1   Blue foam   Mini-squat        With opp foot reach   Resisted walking Quick - posterior  100# sed pull 5x       Quick posterior - 2x10   Physioball hamstring curls nt     3x15 nt   nt                BOSU - single leg stance with ball catch nt  30 tosses 2x  x30 Inverted bosu w pertubations 3x30s Inverted BOSU w  completed  completed With catch   Jumping on leg press Single and Double - 3 sets 3 sets 3sets 60# 30# 3 sets   completed completed completed nt Jogging on tramp Trampoline - x5  x5 5x1 min x5   completed completed completed nt   Hex bar squats 3x15 3x15 3x15 20#   nt   3x10   Lunges     6x 25ft 6x 25ft With yellow bar completed x5 lengths x5 lengths   Split Squat     3x10 3x10 3x10 3x10 3x10 nt   Step to firm SL w med ball rebounder     20x  3x10  3x10 3x10 3x10 nt   Walking hops nt     3x nt   nt                                          Ther Activity                                       Gait Training                                       Modalities

## 2021-06-16 ENCOUNTER — OFFICE VISIT (OUTPATIENT)
Dept: PHYSICAL THERAPY | Age: 46
End: 2021-06-16
Payer: COMMERCIAL

## 2021-06-16 DIAGNOSIS — M25.561 ACUTE PAIN OF RIGHT KNEE: Primary | ICD-10-CM

## 2021-06-16 PROCEDURE — 97112 NEUROMUSCULAR REEDUCATION: CPT | Performed by: PHYSICAL THERAPIST

## 2021-06-16 PROCEDURE — 97110 THERAPEUTIC EXERCISES: CPT | Performed by: PHYSICAL THERAPIST

## 2021-06-17 NOTE — PROGRESS NOTES
Daily Note     Today's date: 2021  Patient name: Edin Sams  : 1975  MRN: [id number]  Referring provider: Rosy Zamora MD  Dx:   Encounter Diagnosis     ICD-10-CM    1  Acute pain of right knee  M25 561                   Subjective: Patient is happy with overall progress of knee - still with some lateral knee pain noted  Objective: See treatment diary below      Assessment: Tolerated treatment well  Patient would benefit from continued PT      Plan: Continue per plan of care        Precautions: None    Manuals 6/14 6/17 5/3 5/7 5/10 5/14 5/17 5/21 6/4 6/7   LC x10 minutes 10 minutes 10' x10 Elliptical 10' Bike 10' x10 minutes x10 minutes x10 minutes x10 minutes                                          Neuro Re-Ed                                                                                                        Ther Ex                                                    Cybex knee flexion 30# - 3 sets 30#  20#   20# - 3x10 20# nt nt   Cybex knee ext 15# - 3 sets 15# -3 sets 20# 3x10 25# 15x 15# SL w 3 SAQ each rep 15x 15# SL w 3 SAQ each rep nt nt 3 sets nt   Cybex leg press 30# - 3 sets 30# 30# 3x10 60#   60# SL 60# nt nt                             Mini squat On / roll - 2x10 2x10   SL to plinth 3x10 15# SL to plinth 3x10 15# nt nt nt nt   Step ups  On BOSU On inverted bosu 3x10 2x15  On inverted BOSU 3x15 completed completed 2x15 3x10 - jump   Biodex - squat and shift nt 1/2 star Star excursion foam 10x 2x15 Star Excursion firm 5x3  Star foam 10x   2x15 Squat and hold level 1   Blue foam   Mini-squat        With opp foot reach   Resisted walking Quick - posterior Running 100# sed pull 5x       Quick posterior - 2x10   Physioball hamstring curls nt 3x10    3x15 nt   nt                BOSU - single leg stance with ball catch nt 5 sets 30 tosses 2x  x30 Inverted bosu w pertubations 3x30s Inverted BOSU w  completed  completed With catch   Jumping on leg press Single and Double - 3 sets 3 sets 3sets 60# 30# 3 sets   completed completed completed nt   Jogging on tramp Trampoline - x5  x5 5x1 min x5   completed completed completed nt   Hex bar squats 3x15 3x15 3x15 20#   nt   3x10   Lunges     6x 25ft 6x 25ft With yellow bar completed x5 lengths x5 lengths   Split Squat     3x10 3x10 3x10 3x10 3x10 nt   Step to firm SL w med ball rebounder     20x  3x10  3x10 3x10 3x10 nt   Walking hops nt     3x nt   nt                                          Ther Activity                                       Gait Training                                       Modalities

## 2021-06-21 ENCOUNTER — OFFICE VISIT (OUTPATIENT)
Dept: PHYSICAL THERAPY | Age: 46
End: 2021-06-21
Payer: COMMERCIAL

## 2021-06-21 DIAGNOSIS — M25.561 ACUTE PAIN OF RIGHT KNEE: Primary | ICD-10-CM

## 2021-06-21 PROCEDURE — 97110 THERAPEUTIC EXERCISES: CPT | Performed by: PHYSICAL THERAPIST

## 2021-06-22 NOTE — PROGRESS NOTES
Daily Note     Today's date: 2021  Patient name: Radha Pulliam  : 1975  MRN: [id number]  Referring provider: Tristian Montalvo MD  Dx:   Encounter Diagnosis     ICD-10-CM    1  Acute pain of right knee  M25 561                   Subjective[place]  Still with some lateral soreness noted  Objective: See treatment diary below      Assessment: Tolerated treatment well  Patient would benefit from continued PT      Plan: Continue per plan of care        Precautions: None    Manuals 6/14 6/17 6/22 5/7 5/10 5/14 5/17 5/21 6   LC x10 minutes 10 minutes 10' x10 Elliptical 10' Bike 10' x10 minutes x10 minutes x10 minutes x10 minutes                                          Neuro Re-Ed                                                                                                        Ther Ex                                                    Cybex knee flexion 30# - 3 sets 30# nt 20#   20# - 3x10 20# nt nt   Cybex knee ext 15# - 3 sets 15# -3 sets nt 25# 15x 15# SL w 3 SAQ each rep 15x 15# SL w 3 SAQ each rep nt nt 3 sets nt   Cybex leg press 30# - 3 sets 30# 30# 3x10 60#   60# SL 60# nt nt                             Mini squat On  roll - 2x10 2x10   SL to plinth 3x10 15# SL to plinth 3x10 15# nt nt nt nt   Step ups  On BOSU On inverted bosu 3x10 2x15  On inverted BOSU 3x15 completed completed 2x15 3x10 - jump   Biodex - squat and shift nt /2 star Star excursion foam 10x 2x15 Star Excursion firm 5x3  Star foam 10x   2x15 Squat and hold level 1   Blue foam   Mini-squat        With opp foot reach   Resisted walking Quick - posterior Running 100# sed pull 5x       Quick posterior - 2x10   Physioball hamstring curls nt 3x10 3x10   3x15 nt   nt                BOSU - single leg stance with ball catch nt 5 sets 30 tosses 2x  x30 Inverted bosu w pertubations 3x30s Inverted BOSU w  completed  completed With catch   Jumping on leg press Single and Double - 3 sets 3 sets 3sets 60# 30# 3 sets   completed completed completed nt   Jogging on tramp Trampoline - x5  x5 5x1 min x5   completed completed completed nt   Hex bar squats 3x15 3x15 3x15 20#   nt   3x10   Lunges     6x 25ft 6x 25ft With yellow bar completed x5 lengths x5 lengths   Split Squat     3x10 3x10 3x10 3x10 3x10 nt   Step to firm SL w med ball rebounder     20x  3x10  3x10 3x10 3x10 nt   Walking hops nt     3x nt   nt                                          Ther Activity                                       Gait Training                                       Modalities

## 2021-06-25 ENCOUNTER — APPOINTMENT (OUTPATIENT)
Dept: PHYSICAL THERAPY | Age: 46
End: 2021-06-25
Payer: COMMERCIAL

## 2021-06-25 DIAGNOSIS — F41.9 ANXIETY: ICD-10-CM

## 2021-06-25 DIAGNOSIS — J01.90 ACUTE NON-RECURRENT SINUSITIS, UNSPECIFIED LOCATION: ICD-10-CM

## 2021-06-25 RX ORDER — FLUTICASONE PROPIONATE 50 MCG
1 SPRAY, SUSPENSION (ML) NASAL DAILY
Qty: 16 G | Refills: 1 | Status: SHIPPED | OUTPATIENT
Start: 2021-06-25 | End: 2021-10-15 | Stop reason: SDUPTHER

## 2021-06-25 RX ORDER — ALPRAZOLAM 0.25 MG/1
TABLET ORAL
Qty: 20 TABLET | Refills: 0 | Status: SHIPPED | OUTPATIENT
Start: 2021-06-25 | End: 2021-09-17 | Stop reason: SDUPTHER

## 2021-06-28 ENCOUNTER — OFFICE VISIT (OUTPATIENT)
Dept: PHYSICAL THERAPY | Age: 46
End: 2021-06-28
Payer: COMMERCIAL

## 2021-06-28 DIAGNOSIS — M25.561 ACUTE PAIN OF RIGHT KNEE: Primary | ICD-10-CM

## 2021-06-28 PROCEDURE — 97110 THERAPEUTIC EXERCISES: CPT | Performed by: PHYSICAL THERAPIST

## 2021-06-29 NOTE — PROGRESS NOTES
Daily Note     Today's date: 2021  Patient name: Tatyana Neal  : 1975  MRN: [id number]  Referring provider: Marilu Segura MD  Dx:   Encounter Diagnosis     ICD-10-CM    1  Acute pain of right knee  M25 561                   Subjective: Intermittent lateral soreness noted  Objective: See treatment diary below      Assessment: Tolerated treatment well  Patient would benefit from continued PT      Plan: Continue per plan of care        Precautions: None    Manuals 6/14 6/17 6/22 6/28 5/10 5/14 5/17 5/21 6 6   LC x10 minutes 10 minutes 10' x10 Elliptical 10' Bike 10' x10 minutes x10 minutes x10 minutes x10 minutes                                          Neuro Re-Ed                                                                                                        Ther Ex                                                    Cybex knee flexion 30# - 3 sets 30# nt 20#   20# - 3x10 20# nt nt   Cybex knee ext 15# - 3 sets 15# -3 sets nt 25# 15x 15# SL w 3 SAQ each rep 15x 15# SL w 3 SAQ each rep nt nt 3 sets nt   Cybex leg press 30# - 3 sets 30# 30# 3x10 60#   60# SL 60# nt nt                             Mini squat On  roll - 2x10 2x10   SL to plinth 3x10 15# SL to plinth 3x10 15# nt nt nt nt   Step ups  On BOSU On inverted bosu 3x10 2x15  On inverted BOSU 3x15 completed completed 2x15 3x10 - jump   Biodex - squat and shift nt / star Star excursion foam 10x 2x15 Star Excursion firm 5x3  Star foam 10x   2x15 Squat and hold level 1   Blue foam   Mini-squat        With opp foot reach   Resisted walking Quick - posterior Running 100# sed pull 5x       Quick posterior - 2x10   Physioball hamstring curls nt 3x10 3x10   3x15 nt   nt                BOSU - single leg stance with ball catch nt 5 sets 30 tosses 2x  x30 Inverted bosu w pertubations 3x30s Inverted BOSU w  completed  completed With catch   Jumping on leg press Single and Double - 3 sets 3 sets 3sets 60# 30# 3 sets   completed completed completed nt   Jogging on tramp Trampoline - x5  x5 5x1 min x5   completed completed completed nt   Hex bar squats 3x15 3x15 3x15 20#   nt   3x10   Lunges     6x 25ft 6x 25ft With yellow bar completed x5 lengths x5 lengths   Split Squat     3x10 3x10 3x10 3x10 3x10 nt   Step to firm SL w med ball rebounder     20x  3x10  3x10 3x10 3x10 nt   Walking hops nt     3x nt   nt                                          Ther Activity                                       Gait Training                                       Modalities

## 2021-07-02 ENCOUNTER — OFFICE VISIT (OUTPATIENT)
Dept: PHYSICAL THERAPY | Age: 46
End: 2021-07-02
Payer: COMMERCIAL

## 2021-07-02 DIAGNOSIS — M25.561 ACUTE PAIN OF RIGHT KNEE: Primary | ICD-10-CM

## 2021-07-02 PROCEDURE — 97112 NEUROMUSCULAR REEDUCATION: CPT | Performed by: PHYSICAL THERAPIST

## 2021-07-02 PROCEDURE — 97110 THERAPEUTIC EXERCISES: CPT | Performed by: PHYSICAL THERAPIST

## 2021-07-02 NOTE — PROGRESS NOTES
Daily Note     Today's date: 2021  Patient name: Terri Stewart  : 1975  MRN: [id number]  Referring provider: Ulises Lester MD  Dx:   Encounter Diagnosis     ICD-10-CM    1  Acute pain of right knee  M25 561                   Subjective: Lateral knee pain better  Objective: See treatment diary below      Assessment: Tolerated treatment well  Patient would benefit from continued PT      Plan: Continue per plan of care        Precautions: None    Manuals  6   LC x10 minutes 10 minutes 10' x10 Elliptical 10' Bike 10' x10 minutes x10 minutes x10 minutes x10 minutes                                          Neuro Re-Ed                                                                                                        Ther Ex                                                    Cybex knee flexion 30# - 3 sets 30# nt 20# nt  20# - 3x10 20# nt nt   Cybex knee ext 15# - 3 sets 15# -3 sets nt 25# nt 15x 15# SL w 3 SAQ each rep nt nt 3 sets nt   Cybex leg press 30# - 3 sets 30# 30# 3x10 60# nt  60# SL 60# nt nt                             Mini squat On  roll - 2x10 2x10   SL to plinth 3x10 15# SL to plinth 3x10 15# nt nt nt nt   Step ups  On BOSU On inverted bosu 3x10 2x15 BOSU - with jump On inverted BOSU 3x15 completed completed 2x15 3x10 - jump   Biodex - squat and shift nt  star Star excursion foam 10x 2x15 nt Star foam 10x   2x15 Squat and hold level 1   Blue foam   Mini-squat   nt     With opp foot reach   Resisted walking Quick - posterior Running 100# sed pull 5x  With [place]     Quick posterior - 2x10   Physioball hamstring curls nt 3x10 3x10  3 sets 3x15 nt   nt                BOSU - single leg stance with ball catch nt 5 sets 30 tosses 2x  x30 Inverted bosu w pertubations 3x30s Inverted BOSU w  completed  completed With catch   Jumping on leg press Single and Double - 3 sets 3 sets 3sets 60# 30# 3 sets nt  completed completed completed nt Jogging on tramp Trampoline - x5  x5 5x1 min x5   completed completed completed nt   Hex bar squats 3x15 3x15 3x15 20# 20#  nt   3x10   Lunges     6x 25ft 6x 25ft With yellow bar completed x5 lengths x5 lengths   Split Squat     3x10 3x10 3x10 3x10 3x10 nt   Step to firm SL w med ball rebounder     20x  3x10  3x10 3x10 3x10 nt   Walking hops nt     3x nt   nt                                          Ther Activity                                       Gait Training                                       Modalities

## 2021-07-12 ENCOUNTER — OFFICE VISIT (OUTPATIENT)
Dept: PHYSICAL THERAPY | Age: 46
End: 2021-07-12
Payer: COMMERCIAL

## 2021-07-12 DIAGNOSIS — M25.561 ACUTE PAIN OF RIGHT KNEE: Primary | ICD-10-CM

## 2021-07-12 PROCEDURE — 97110 THERAPEUTIC EXERCISES: CPT | Performed by: PHYSICAL THERAPIST

## 2021-07-12 PROCEDURE — 97112 NEUROMUSCULAR REEDUCATION: CPT | Performed by: PHYSICAL THERAPIST

## 2021-07-13 NOTE — PROGRESS NOTES
Daily Note     Today's date: 2021  Patient name: Mario Parker  : 1975  MRN: [id number]  Referring provider: Jean Matute MD  Dx:   Encounter Diagnosis     ICD-10-CM    1  Acute pain of right knee  M25 561                   Subjective: Some lateral knee soreness persists      Objective: See treatment diary below      Assessment: Tolerated treatment well  Plan: D/C to [facility] - f/u in 6 weeks  Goals achieved       Precautions: None    Manuals    LC x10 minutes 10 minutes 10' x10 Elliptical 10' Bike 10' x10 minutes x10 minutes x10 minutes x10 minutes                                          Neuro Re-Ed                                                                                                        Ther Ex                                                    Cybex knee flexion 30# - 3 sets 30# nt 20# nt  20# - 3x10 20# nt nt   Cybex knee ext 15# - 3 sets 15# -3 sets nt 25# nt 15x 15# SL w 3 SAQ each rep nt nt 3 sets nt   Cybex leg press 30# - 3 sets 30# 30# 3x10 60# nt  60# SL 60# nt nt                             Mini squat On  roll - 2x10 2x10   SL to plinth 3x10 15# SL to plinth 3x10 15# nt nt nt nt   Step ups  On BOSU On inverted bosu 3x10 2x15 BOSU - with jump On inverted BOSU 3x15 completed completed 2x15 3x10 - jump   Biodex - squat and shift nt  star Star excursion foam 10x 2x15 nt Star foam 10x   2x15 Squat and hold level 1   Blue foam   Mini-squat   nt     With opp foot reach   Resisted walking Quick - posterior Running 100# sed pull 5x  With [place]     Quick posterior - 2x10   Physioball hamstring curls nt 3x10 3x10  3 sets 3x15 nt   nt                BOSU - single leg stance with ball catch nt 5 sets 30 tosses 2x  x30 Inverted bosu w pertubations 3x30s Inverted BOSU w  completed  completed With catch   Jumping on leg press Single and Double - 3 sets 3 sets 3sets 60# 30# 3 sets nt  completed completed completed nt   Jogging on tramp Trampoline - x5  x5 5x1 min x5   completed completed completed nt   Hex bar squats 3x15 3x15 3x15 20# 20#  nt   3x10   Lunges     6x 25ft 6x 25ft With yellow bar completed x5 lengths x5 lengths   Split Squat     3x10 3x10 3x10 3x10 3x10 nt   Step to firm SL w med ball rebounder     20x  3x10  3x10 3x10 3x10 nt   Walking hops nt     3x nt   nt                                          Ther Activity                                       Gait Training                                       Modalities

## 2021-08-09 NOTE — PROGRESS NOTES
Patient's Lab: [facility]    Last Yearly: 6/25/20  Last Pap: 12/28/18 (Pap Today due to HX, if negative again in 5yrs )  Results: -/-  Last Mammo Date: 1/26/21  Birads: 2-  Lifetime Risk Assessment: 11 09%  Next Mammo Scheduled?: No  Script Needed?: Yes  Colon: No  BC: Vasectomy  LMP: February  S/P Covid Shot: Complete  Sexually Active?: Yes    HX: HPV 16 in 2015, Dysmenorrhea  Fam  HX: No Breast Colon or Ovarian Cancer in the family    Q's/Concerns: Just perimenopausal

## 2021-08-12 NOTE — PROGRESS NOTES
Assessment/Plan:  Calcium 1000 mg + 600-1000 IU Vit D daily  Pap with high risk HPV Q 5 years, if normal  Done today  Annual mammogram  Monthly breast self exam encouraged  Exercise 150 minutes per week minimum  Colon cancer screening to begin at age 38 with no other risk factors  Referred to Dr Lorin Trimble or Dr Julia Mesa 20 times twice daily  Use silicone based lubricant with sex as needed  (Water based only for use with condoms or sexual toys )       Diagnoses and all orders for this visit:    Encntr for gyn exam (general) (routine) w/o abn findings  -     Liquid-based pap, screening    Encounter for Papanicolaou smear for cervical cancer screening  -     Liquid-based pap, screening    Encounter for screening mammogram for breast cancer  -     Mammo screening bilateral w 3d & cad; Future    Screening for colon cancer  -     Ambulatory referral to Gastroenterology; Future    Other orders  -     MAGNESIUM CARBONATE PO; Take by mouth  -     Cyanocobalamin (B-12 PO); Take by mouth  -     Cholecalciferol (D3 PO); Take by mouth  -     Coenzyme Q10 (CO Q-10 PO); Take by mouth          Subjective:      Patient ID: Arpit Andujar is a 54 y o  female  Arpit Andujar is a 54 y  o  (25 boy, 25 girl, 23 boy)  female who is here today for her annual visit  No health concerns  LMP  with no bleeding since  Irregular bleeding x 5 days with moderate flow  Menses is acceptable  Exercisers 3 times per week  Arpit Andujar is sexually active with boyfriend of 6 years  She feels safe in her relationship  HPV 16 noted on pap in  as per previous providers notes (Dr Umesh Omer)  Pap  was normal with negative HR HPV  Normal mammogram 21         The following portions of the patient's history were reviewed and updated as appropriate: allergies, current medications, past family history, past medical history, past social history, past surgical history and problem list     Review of Systems Constitutional: Negative  Negative for activity change, appetite change, chills, diaphoresis, fatigue, fever and unexpected weight change  HENT: Negative for congestion, dental problem, sneezing, sore throat and trouble swallowing  Eyes: Negative for visual disturbance  Respiratory: Negative for chest tightness and shortness of breath  Cardiovascular: Negative for chest pain and leg swelling  Gastrointestinal: Negative for abdominal pain, constipation, diarrhea, nausea and vomiting  Genitourinary: Positive for menstrual problem  Negative for difficulty urinating, dyspareunia, dysuria, frequency, hematuria, pelvic pain, urgency, vaginal bleeding, vaginal discharge and vaginal pain  Musculoskeletal: Negative for back pain and neck pain  Skin: Negative  Allergic/Immunologic: Negative  Neurological: Negative for weakness and headaches  Hematological: Negative for adenopathy  Psychiatric/Behavioral: Negative  Objective:      /80 (BP Location: Left arm, Patient Position: Sitting, Cuff Size: Standard)   Ht 5' 2" (1 575 m)   Wt 55 3 kg (122 lb)   LMP 02/15/2021 (Approximate)   BMI 22 31 kg/m²          Physical Exam  Vitals and nursing note reviewed  Constitutional:       Appearance: Normal appearance  She is well-developed  HENT:      Head: Normocephalic and atraumatic  Eyes:      General:         Right eye: No discharge  Left eye: No discharge  Neck:      Thyroid: No thyromegaly  Trachea: Trachea normal    Cardiovascular:      Rate and Rhythm: Normal rate and regular rhythm  Heart sounds: Normal heart sounds  Pulmonary:      Effort: Pulmonary effort is normal       Breath sounds: Normal breath sounds  Chest:      Breasts: Breasts are symmetrical          Right: Normal  No inverted nipple, mass, nipple discharge, skin change or tenderness  Left: Normal  No inverted nipple, mass, nipple discharge, skin change or tenderness     Abdominal: General: Abdomen is flat  Palpations: Abdomen is soft  Genitourinary:     General: Normal vulva  Exam position: Lithotomy position  Labia:         Right: No rash, tenderness, lesion or injury  Left: No rash, tenderness, lesion or injury  Urethra: No prolapse, urethral pain, urethral swelling or urethral lesion  Vagina: Normal  No signs of injury and foreign body  No vaginal discharge, erythema, tenderness or bleeding  Cervix: Normal       Uterus: Normal        Adnexa: Right adnexa normal         Right: No mass, tenderness or fullness  Left: No mass, tenderness or fullness  Rectum: No external hemorrhoid  Comments: Pelvic tone 2/5  Musculoskeletal:         General: Normal range of motion  Cervical back: Normal range of motion and neck supple  Lymphadenopathy:      Head:      Right side of head: No submental, submandibular or tonsillar adenopathy  Left side of head: No submental, submandibular or tonsillar adenopathy  Cervical: No cervical adenopathy  Upper Body:      Right upper body: No supraclavicular or axillary adenopathy  Left upper body: No supraclavicular or axillary adenopathy  Lower Body: No right inguinal adenopathy  No left inguinal adenopathy  Skin:     General: Skin is warm and dry  Neurological:      Mental Status: She is alert and oriented to person, place, and time     Psychiatric:         Mood and Affect: Mood normal          Behavior: Behavior normal

## 2021-08-13 ENCOUNTER — OFFICE VISIT (OUTPATIENT)
Dept: OBGYN CLINIC | Facility: CLINIC | Age: 46
End: 2021-08-13
Payer: COMMERCIAL

## 2021-08-13 VITALS
WEIGHT: 122 LBS | HEIGHT: 62 IN | SYSTOLIC BLOOD PRESSURE: 120 MMHG | DIASTOLIC BLOOD PRESSURE: 80 MMHG | BODY MASS INDEX: 22.45 KG/M2

## 2021-08-13 DIAGNOSIS — Z01.419 ENCNTR FOR GYN EXAM (GENERAL) (ROUTINE) W/O ABN FINDINGS: Primary | ICD-10-CM

## 2021-08-13 DIAGNOSIS — Z12.11 SCREENING FOR COLON CANCER: ICD-10-CM

## 2021-08-13 DIAGNOSIS — Z12.4 ENCOUNTER FOR PAPANICOLAOU SMEAR FOR CERVICAL CANCER SCREENING: ICD-10-CM

## 2021-08-13 DIAGNOSIS — Z12.31 ENCOUNTER FOR SCREENING MAMMOGRAM FOR BREAST CANCER: ICD-10-CM

## 2021-08-13 PROCEDURE — G0145 SCR C/V CYTO,THINLAYER,RESCR: HCPCS | Performed by: NURSE PRACTITIONER

## 2021-08-13 PROCEDURE — 99396 PREV VISIT EST AGE 40-64: CPT | Performed by: NURSE PRACTITIONER

## 2021-08-13 PROCEDURE — G0476 HPV COMBO ASSAY CA SCREEN: HCPCS | Performed by: NURSE PRACTITIONER

## 2021-08-13 NOTE — PATIENT INSTRUCTIONS
Calcium 1000 mg + 600-1000 IU Vit D daily  Pap with high risk HPV Q 5 years, if normal  Done today  Annual mammogram  Monthly breast self exam encouraged  Exercise 150 minutes per week minimum  Colon cancer screening to begin at age 38 with no other risk factors  Referred to Dr Cleo Eldridge or Dr Dilan Mesa 20 times twice daily  Use silicone based lubricant with sex as needed   (Water based only for use with condoms or sexual toys )

## 2021-08-18 LAB
LAB AP GYN PRIMARY INTERPRETATION: NORMAL
Lab: NORMAL
PATH INTERP SPEC-IMP: NORMAL

## 2021-09-10 ENCOUNTER — ESTABLISHED COMPREHENSIVE EXAM (OUTPATIENT)
Dept: URBAN - METROPOLITAN AREA CLINIC 6 | Facility: CLINIC | Age: 46
End: 2021-09-10

## 2021-09-10 DIAGNOSIS — D23.122: ICD-10-CM

## 2021-09-10 DIAGNOSIS — H25.013: ICD-10-CM

## 2021-09-10 DIAGNOSIS — H52.4: ICD-10-CM

## 2021-09-10 DIAGNOSIS — G43.709 CHRONIC MIGRAINE WITHOUT AURA WITHOUT STATUS MIGRAINOSUS, NOT INTRACTABLE: ICD-10-CM

## 2021-09-10 PROCEDURE — 92015 DETERMINE REFRACTIVE STATE: CPT

## 2021-09-10 PROCEDURE — G8427 DOCREV CUR MEDS BY ELIG CLIN: HCPCS

## 2021-09-10 PROCEDURE — 92014 COMPRE OPH EXAM EST PT 1/>: CPT

## 2021-09-10 PROCEDURE — 1036F TOBACCO NON-USER: CPT

## 2021-09-10 RX ORDER — SUMATRIPTAN 50 MG/1
50 TABLET, FILM COATED ORAL AS NEEDED
Qty: 9 TABLET | Refills: 4 | Status: SHIPPED | OUTPATIENT
Start: 2021-09-10 | End: 2022-05-27 | Stop reason: SDUPTHER

## 2021-09-10 ASSESSMENT — VISUAL ACUITY
OS_CC: J1+
OD_SC: 20/25-1
OD_CC: J1+
OS_SC: 20/20-1

## 2021-09-10 ASSESSMENT — TONOMETRY
OD_IOP_MMHG: 20
OS_IOP_MMHG: 19

## 2021-09-10 NOTE — TELEPHONE ENCOUNTER
Called patient and left a message  Advised patient to callback to schedule a follow up appointment  Advised LOV-11/3/20 w/Dr Edgar Martin, and Dr Edgar Martin left our practice in March  Advised follow up appointment will be, either w/an AP or an attending  Advised to callback to schedule

## 2021-09-10 NOTE — TELEPHONE ENCOUNTER
Patient of Alicia Chan and Dr Tavares visit 11/3     calling for refill of sumatriptan, due for refill, please sign script if in agreement  Thank you   No f/u scheduled yet      \[facility]\"" pharmacy [place]

## 2021-09-17 DIAGNOSIS — F41.9 ANXIETY: ICD-10-CM

## 2021-09-17 RX ORDER — ALPRAZOLAM 0.25 MG/1
TABLET ORAL
Qty: 20 TABLET | Refills: 0 | Status: SHIPPED | OUTPATIENT
Start: 2021-09-17 | End: 2021-10-15 | Stop reason: SDUPTHER

## 2021-10-02 ENCOUNTER — IMMUNIZATIONS (OUTPATIENT)
Dept: FAMILY MEDICINE CLINIC | Facility: HOSPITAL | Age: 46
End: 2021-10-02

## 2021-10-02 DIAGNOSIS — Z23 ENCOUNTER FOR IMMUNIZATION: Primary | ICD-10-CM

## 2021-10-02 PROCEDURE — 0001A SARS-COV-2 / COVID-19 MRNA VACCINE (PFIZER-BIONTECH) 30 MCG: CPT

## 2021-10-02 PROCEDURE — 91300 SARS-COV-2 / COVID-19 MRNA VACCINE (PFIZER-BIONTECH) 30 MCG: CPT

## 2021-10-12 ENCOUNTER — TELEPHONE (OUTPATIENT)
Dept: NEUROLOGY | Facility: CLINIC | Age: 46
End: 2021-10-12

## 2021-10-15 ENCOUNTER — OFFICE VISIT (OUTPATIENT)
Dept: NEUROLOGY | Facility: CLINIC | Age: 46
End: 2021-10-15
Payer: COMMERCIAL

## 2021-10-15 ENCOUNTER — OFFICE VISIT (OUTPATIENT)
Dept: INTERNAL MEDICINE CLINIC | Facility: CLINIC | Age: 46
End: 2021-10-15
Payer: COMMERCIAL

## 2021-10-15 VITALS
HEIGHT: 62 IN | WEIGHT: 124.4 LBS | HEART RATE: 57 BPM | BODY MASS INDEX: 22.89 KG/M2 | TEMPERATURE: 96.8 F | SYSTOLIC BLOOD PRESSURE: 116 MMHG | DIASTOLIC BLOOD PRESSURE: 74 MMHG | OXYGEN SATURATION: 100 %

## 2021-10-15 VITALS
HEART RATE: 60 BPM | DIASTOLIC BLOOD PRESSURE: 65 MMHG | TEMPERATURE: 97.6 F | SYSTOLIC BLOOD PRESSURE: 106 MMHG | BODY MASS INDEX: 22.55 KG/M2 | WEIGHT: 123.3 LBS

## 2021-10-15 DIAGNOSIS — S83.511S RUPTURE OF ANTERIOR CRUCIATE LIGAMENT OF RIGHT KNEE, SEQUELA: ICD-10-CM

## 2021-10-15 DIAGNOSIS — F41.9 ANXIETY: ICD-10-CM

## 2021-10-15 DIAGNOSIS — G43.829 MENSTRUAL MIGRAINE WITHOUT STATUS MIGRAINOSUS, NOT INTRACTABLE: ICD-10-CM

## 2021-10-15 DIAGNOSIS — Z00.00 LABORATORY EXAMINATION ORDERED AS PART OF A ROUTINE GENERAL MEDICAL EXAMINATION: ICD-10-CM

## 2021-10-15 DIAGNOSIS — J30.89 ALLERGIC RHINITIS DUE TO OTHER ALLERGIC TRIGGER, UNSPECIFIED SEASONALITY: ICD-10-CM

## 2021-10-15 DIAGNOSIS — F41.8 SITUATIONAL ANXIETY: ICD-10-CM

## 2021-10-15 DIAGNOSIS — G43.009 MIGRAINE WITHOUT AURA AND WITHOUT STATUS MIGRAINOSUS, NOT INTRACTABLE: Primary | ICD-10-CM

## 2021-10-15 DIAGNOSIS — E55.9 VITAMIN D DEFICIENCY: ICD-10-CM

## 2021-10-15 DIAGNOSIS — J01.90 ACUTE NON-RECURRENT SINUSITIS, UNSPECIFIED LOCATION: ICD-10-CM

## 2021-10-15 DIAGNOSIS — Z00.00 HEALTH MAINTENANCE EXAMINATION: Primary | ICD-10-CM

## 2021-10-15 DIAGNOSIS — G43.009 MIGRAINE WITHOUT AURA AND WITHOUT STATUS MIGRAINOSUS, NOT INTRACTABLE: ICD-10-CM

## 2021-10-15 PROBLEM — B35.1 ONYCHOMYCOSIS: Status: RESOLVED | Noted: 2020-10-09 | Resolved: 2021-10-15

## 2021-10-15 PROCEDURE — 99396 PREV VISIT EST AGE 40-64: CPT | Performed by: INTERNAL MEDICINE

## 2021-10-15 PROCEDURE — 99214 OFFICE O/P EST MOD 30 MIN: CPT | Performed by: PHYSICIAN ASSISTANT

## 2021-10-15 RX ORDER — FLUTICASONE PROPIONATE 50 MCG
1 SPRAY, SUSPENSION (ML) NASAL DAILY
Qty: 48 G | Refills: 1 | Status: SHIPPED | OUTPATIENT
Start: 2021-10-15 | End: 2021-12-02 | Stop reason: SDUPTHER

## 2021-10-15 RX ORDER — ALPRAZOLAM 0.25 MG/1
TABLET ORAL
Qty: 20 TABLET | Refills: 0 | Status: SHIPPED | OUTPATIENT
Start: 2021-10-15

## 2021-11-10 ENCOUNTER — TELEPHONE (OUTPATIENT)
Dept: INTERNAL MEDICINE CLINIC | Facility: CLINIC | Age: 46
End: 2021-11-10

## 2021-11-10 DIAGNOSIS — J06.9 VIRAL UPPER RESPIRATORY TRACT INFECTION: ICD-10-CM

## 2021-11-10 RX ORDER — ALBUTEROL SULFATE 90 UG/1
2 AEROSOL, METERED RESPIRATORY (INHALATION) EVERY 6 HOURS PRN
Qty: 8 G | Refills: 1 | Status: SHIPPED | OUTPATIENT
Start: 2021-11-10

## 2021-11-23 ENCOUNTER — OFFICE VISIT (OUTPATIENT)
Dept: OBGYN CLINIC | Facility: MEDICAL CENTER | Age: 46
End: 2021-11-23
Payer: COMMERCIAL

## 2021-11-23 VITALS
SYSTOLIC BLOOD PRESSURE: 121 MMHG | WEIGHT: 124.4 LBS | BODY MASS INDEX: 22.89 KG/M2 | HEIGHT: 62 IN | HEART RATE: 67 BPM | DIASTOLIC BLOOD PRESSURE: 79 MMHG

## 2021-11-23 DIAGNOSIS — S83.411A SPRAIN OF MEDIAL COLLATERAL LIGAMENT OF RIGHT KNEE, INITIAL ENCOUNTER: ICD-10-CM

## 2021-11-23 DIAGNOSIS — S83.281A TEAR OF LATERAL MENISCUS OF RIGHT KNEE, CURRENT, UNSPECIFIED TEAR TYPE, INITIAL ENCOUNTER: ICD-10-CM

## 2021-11-23 DIAGNOSIS — S83.511A RUPTURE OF ANTERIOR CRUCIATE LIGAMENT OF RIGHT KNEE, INITIAL ENCOUNTER: Primary | ICD-10-CM

## 2021-11-23 PROCEDURE — 99214 OFFICE O/P EST MOD 30 MIN: CPT | Performed by: ORTHOPAEDIC SURGERY

## 2021-11-26 ENCOUNTER — TELEPHONE (OUTPATIENT)
Dept: INTERNAL MEDICINE CLINIC | Facility: CLINIC | Age: 46
End: 2021-11-26

## 2021-11-26 DIAGNOSIS — K04.7 DENTAL INFECTION: Primary | ICD-10-CM

## 2021-11-26 RX ORDER — AMOXICILLIN 500 MG/1
500 CAPSULE ORAL EVERY 8 HOURS SCHEDULED
Qty: 21 CAPSULE | Refills: 0 | Status: SHIPPED | OUTPATIENT
Start: 2021-11-26 | End: 2021-12-03

## 2021-12-02 DIAGNOSIS — J01.90 ACUTE NON-RECURRENT SINUSITIS, UNSPECIFIED LOCATION: ICD-10-CM

## 2021-12-02 RX ORDER — FLUTICASONE PROPIONATE 50 MCG
1 SPRAY, SUSPENSION (ML) NASAL DAILY
Qty: 48 G | Refills: 1 | Status: SHIPPED | OUTPATIENT
Start: 2021-12-02

## 2021-12-10 ENCOUNTER — LAB (OUTPATIENT)
Dept: LAB | Facility: CLINIC | Age: 46
End: 2021-12-10
Payer: COMMERCIAL

## 2021-12-10 DIAGNOSIS — G43.009 MIGRAINE WITHOUT AURA AND WITHOUT STATUS MIGRAINOSUS, NOT INTRACTABLE: ICD-10-CM

## 2021-12-10 DIAGNOSIS — E55.9 VITAMIN D DEFICIENCY: ICD-10-CM

## 2021-12-10 DIAGNOSIS — G43.829 MENSTRUAL MIGRAINE WITHOUT STATUS MIGRAINOSUS, NOT INTRACTABLE: ICD-10-CM

## 2021-12-10 LAB
25(OH)D3 SERPL-MCNC: 46.2 NG/ML (ref 30–100)
ALBUMIN SERPL BCP-MCNC: 4.1 G/DL (ref 3.5–5)
ALP SERPL-CCNC: 49 U/L (ref 46–116)
ALT SERPL W P-5'-P-CCNC: 20 U/L (ref 12–78)
ANION GAP SERPL CALCULATED.3IONS-SCNC: 7 MMOL/L (ref 4–13)
AST SERPL W P-5'-P-CCNC: 18 U/L (ref 5–45)
BASOPHILS # BLD AUTO: 0.03 THOUSANDS/ΜL (ref 0–0.1)
BASOPHILS NFR BLD AUTO: 1 % (ref 0–1)
BILIRUB SERPL-MCNC: 0.63 MG/DL (ref 0.2–1)
BUN SERPL-MCNC: 16 MG/DL (ref 5–25)
CALCIUM SERPL-MCNC: 9.7 MG/DL (ref 8.3–10.1)
CHLORIDE SERPL-SCNC: 105 MMOL/L (ref 100–108)
CHOLEST SERPL-MCNC: 207 MG/DL
CO2 SERPL-SCNC: 26 MMOL/L (ref 21–32)
CREAT SERPL-MCNC: 0.89 MG/DL (ref 0.6–1.3)
EOSINOPHIL # BLD AUTO: 0.12 THOUSAND/ΜL (ref 0–0.61)
EOSINOPHIL NFR BLD AUTO: 4 % (ref 0–6)
ERYTHROCYTE [DISTWIDTH] IN BLOOD BY AUTOMATED COUNT: 12.3 % (ref 11.6–15.1)
GFR SERPL CREATININE-BSD FRML MDRD: 77 ML/MIN/1.73SQ M
GLUCOSE P FAST SERPL-MCNC: 78 MG/DL (ref 65–99)
HCT VFR BLD AUTO: 38.2 % (ref 34.8–46.1)
HDLC SERPL-MCNC: 91 MG/DL
HGB BLD-MCNC: 12.4 G/DL (ref 11.5–15.4)
IMM GRANULOCYTES # BLD AUTO: 0.01 THOUSAND/UL (ref 0–0.2)
IMM GRANULOCYTES NFR BLD AUTO: 0 % (ref 0–2)
LDLC SERPL CALC-MCNC: 110 MG/DL (ref 0–100)
LYMPHOCYTES # BLD AUTO: 1.44 THOUSANDS/ΜL (ref 0.6–4.47)
LYMPHOCYTES NFR BLD AUTO: 43 % (ref 14–44)
MCH RBC QN AUTO: 30.6 PG (ref 26.8–34.3)
MCHC RBC AUTO-ENTMCNC: 32.5 G/DL (ref 31.4–37.4)
MCV RBC AUTO: 94 FL (ref 82–98)
MONOCYTES # BLD AUTO: 0.3 THOUSAND/ΜL (ref 0.17–1.22)
MONOCYTES NFR BLD AUTO: 9 % (ref 4–12)
NEUTROPHILS # BLD AUTO: 1.46 THOUSANDS/ΜL (ref 1.85–7.62)
NEUTS SEG NFR BLD AUTO: 43 % (ref 43–75)
NONHDLC SERPL-MCNC: 116 MG/DL
NRBC BLD AUTO-RTO: 0 /100 WBCS
PLATELET # BLD AUTO: 207 THOUSANDS/UL (ref 149–390)
PMV BLD AUTO: 10.4 FL (ref 8.9–12.7)
POTASSIUM SERPL-SCNC: 4.4 MMOL/L (ref 3.5–5.3)
PROT SERPL-MCNC: 7.5 G/DL (ref 6.4–8.2)
RBC # BLD AUTO: 4.05 MILLION/UL (ref 3.81–5.12)
SODIUM SERPL-SCNC: 138 MMOL/L (ref 136–145)
TRIGL SERPL-MCNC: 30 MG/DL
TSH SERPL DL<=0.05 MIU/L-ACNC: 2.11 UIU/ML (ref 0.36–3.74)
VIT B12 SERPL-MCNC: 542 PG/ML (ref 100–900)
WBC # BLD AUTO: 3.36 THOUSAND/UL (ref 4.31–10.16)

## 2021-12-10 PROCEDURE — 82306 VITAMIN D 25 HYDROXY: CPT

## 2021-12-10 PROCEDURE — 84443 ASSAY THYROID STIM HORMONE: CPT | Performed by: INTERNAL MEDICINE

## 2021-12-10 PROCEDURE — 85025 COMPLETE CBC W/AUTO DIFF WBC: CPT | Performed by: INTERNAL MEDICINE

## 2021-12-10 PROCEDURE — 82607 VITAMIN B-12: CPT

## 2021-12-10 PROCEDURE — 80053 COMPREHEN METABOLIC PANEL: CPT | Performed by: INTERNAL MEDICINE

## 2021-12-10 PROCEDURE — 80061 LIPID PANEL: CPT | Performed by: INTERNAL MEDICINE

## 2021-12-10 PROCEDURE — 36415 COLL VENOUS BLD VENIPUNCTURE: CPT | Performed by: INTERNAL MEDICINE

## 2021-12-13 ENCOUNTER — TELEMEDICINE (OUTPATIENT)
Dept: INTERNAL MEDICINE CLINIC | Facility: CLINIC | Age: 46
End: 2021-12-13
Payer: COMMERCIAL

## 2021-12-13 ENCOUNTER — TELEPHONE (OUTPATIENT)
Dept: INTERNAL MEDICINE CLINIC | Facility: CLINIC | Age: 46
End: 2021-12-13

## 2021-12-13 DIAGNOSIS — R49.0 HOARSENESS OF VOICE: Primary | ICD-10-CM

## 2021-12-13 DIAGNOSIS — R07.89 CHEST TIGHTNESS: ICD-10-CM

## 2021-12-13 PROCEDURE — 99213 OFFICE O/P EST LOW 20 MIN: CPT | Performed by: NURSE PRACTITIONER

## 2021-12-14 ENCOUNTER — APPOINTMENT (OUTPATIENT)
Dept: RADIOLOGY | Age: 46
End: 2021-12-14
Payer: COMMERCIAL

## 2021-12-14 DIAGNOSIS — R07.89 CHEST TIGHTNESS: ICD-10-CM

## 2021-12-14 PROCEDURE — 71046 X-RAY EXAM CHEST 2 VIEWS: CPT

## 2021-12-16 ENCOUNTER — TELEPHONE (OUTPATIENT)
Dept: INTERNAL MEDICINE CLINIC | Facility: CLINIC | Age: 46
End: 2021-12-16

## 2022-01-03 ENCOUNTER — NURSE TRIAGE (OUTPATIENT)
Dept: OTHER | Facility: OTHER | Age: 47
End: 2022-01-03

## 2022-01-03 NOTE — TELEPHONE ENCOUNTER
Reason for Disposition   [1] COVID-19 diagnosed by positive lab test (e g , PCR, rapid self-test kit) AND [2] mild symptoms (e g , cough, fever, others) AND [4] no complications or SOB    Answer Assessment - Initial Assessment Questions  1  COVID-19 DIAGNOSIS: "Who made your COVID-19 diagnosis?" "Was it confirmed by a positive lab test?" If not diagnosed by a HCP, ask "Are there lots of cases (community spread) where you live?" Note: See public health department website, if unsure  Positive home test  2  COVID-19 EXPOSURE: "Was there any known exposure to COVID before the symptoms began?" [facility] Definition of close contact: within 6 feet (2 meters) for a total of 15 minutes or more over a 24-hour period  Exposed to family-28th      As well as traveling-29th  3  ONSET: "When did the COVID-19 symptoms start?"       Friday started symptoms   4  WORST SYMPTOM: "What is your worst symptom?" (e g , cough, fever, shortness of breath, muscle aches)       Nasal congestion   5  COUGH: "Do you have a cough?" If Yes, ask: "How bad is the cough?"        No   6  FEVER: "Do you have a fever?" If Yes, ask: "What is your temperature, how was it measured, and when did it start?"      No   7  RESPIRATORY STATUS: "Describe your breathing?" (e g , shortness of breath, wheezing, unable to speak)       Normal  8  BETTER-SAME-WORSE: "Are you getting better, staying the same or getting worse compared to yesterday?"  If getting worse, ask, "In what way?"      Worse   9  HIGH RISK DISEASE: "Do you have any chronic medical problems?" (e g , asthma, heart or lung disease, weak immune system, obesity, etc )      No   10  VACCINE: "Have you gotten the COVID-19 vaccine?" If Yes ask: "Which one, how many shots, when did you get it?"        Pfizerx2  11  PREGNANCY: "Is there any chance you are pregnant?" "When was your last menstrual period?"        no  12   OTHER SYMPTOMS: "Do you have any other symptoms?"  (e g , chills, fatigue, headache, loss of smell or taste, muscle pain, sore throat; new loss of smell or taste especially support the diagnosis of COVID-19)        No    Protocols used: CORONAVIRUS (COVID-19) DIAGNOSED OR SUSPECTED-ADULT-

## 2022-01-03 NOTE — TELEPHONE ENCOUNTER
I saw she tested positive through a home test  She does not need a PCR  She should assume she's positive due to exposure and symptoms  She should quarantine for 5 days from symptom onset (day 0 being date of symptom onset) as long as symptoms are resolving

## 2022-01-03 NOTE — TELEPHONE ENCOUNTER
Patient has had multiple recent covid exposures, she is symptomatic on day 3/5 as well as a positive home test  She is afebrile and feels her symptoms are not severe at this time   She states she will follow up with her PCP to confirm if she needs a PCR test

## 2022-01-03 NOTE — TELEPHONE ENCOUNTER
Regarding: Covid    symptomatic   congestion   EMPLOYEE     ----- Message from Aurelio Salcido sent at 1/3/2022 10:43 AM EST -----  "I am a [employer] Employee and I took a home test and it was positive   I am congested and I have questions about the waiting period and getting back to work "

## 2022-01-14 ENCOUNTER — HOSPITAL ENCOUNTER (OUTPATIENT)
Dept: RADIOLOGY | Age: 47
Discharge: HOME | End: 2022-01-14
Payer: COMMERCIAL

## 2022-01-14 DIAGNOSIS — J32.9 CHRONIC SINUSITIS, UNSPECIFIED LOCATION: ICD-10-CM

## 2022-01-14 PROCEDURE — 70486 CT MAXILLOFACIAL W/O DYE: CPT

## 2022-01-14 PROCEDURE — G1004 CDSM NDSC: HCPCS

## 2022-02-09 ENCOUNTER — TELEPHONE (OUTPATIENT)
Dept: OBGYN CLINIC | Facility: CLINIC | Age: 47
End: 2022-02-09

## 2022-05-27 DIAGNOSIS — G43.709 CHRONIC MIGRAINE WITHOUT AURA WITHOUT STATUS MIGRAINOSUS, NOT INTRACTABLE: ICD-10-CM

## 2022-05-27 RX ORDER — SUMATRIPTAN 50 MG/1
50 TABLET, FILM COATED ORAL AS NEEDED
Qty: 9 TABLET | Refills: 4 | Status: SHIPPED | OUTPATIENT
Start: 2022-05-27

## 2022-06-24 ENCOUNTER — TELEPHONE (OUTPATIENT)
Dept: INTERNAL MEDICINE CLINIC | Facility: CLINIC | Age: 47
End: 2022-06-24

## 2022-06-24 DIAGNOSIS — R39.9 URINARY SYMPTOM OR SIGN: Primary | ICD-10-CM

## 2022-06-24 RX ORDER — SULFAMETHOXAZOLE AND TRIMETHOPRIM 800; 160 MG/1; MG/1
1 TABLET ORAL EVERY 12 HOURS SCHEDULED
Qty: 6 TABLET | Refills: 0 | Status: SHIPPED | OUTPATIENT
Start: 2022-06-24 | End: 2022-06-27

## 2022-06-24 NOTE — TELEPHONE ENCOUNTER
Symptoms started on Tuesday  Urinating more often  Is drinking more liquid  No pain or burning with urination  No pain  No blood in urine  No other symptoms  Thinks it may be the start of a UTI? She's worried it will get worse over the weekend    Is there something she can take over the counter or does she need a prescription called in?

## 2022-06-24 NOTE — TELEPHONE ENCOUNTER
Recommend to have urine test done to see if with UTI  Ok to order? I can give antibiotics but best to be sure since you do not have the typical symptoms  You can take Azo or other over the counter medications, may help  98.2

## 2022-06-25 ENCOUNTER — APPOINTMENT (OUTPATIENT)
Dept: LAB | Facility: HOSPITAL | Age: 47
End: 2022-06-25
Payer: COMMERCIAL

## 2022-06-25 DIAGNOSIS — Z00.8 ENCOUNTER FOR OTHER GENERAL EXAMINATION: ICD-10-CM

## 2022-06-25 LAB
BACTERIA UR QL AUTO: ABNORMAL /HPF
BILIRUB UR QL STRIP: NEGATIVE
CHOLEST SERPL-MCNC: 195 MG/DL
CLARITY UR: ABNORMAL
COLOR UR: ABNORMAL
EST. AVERAGE GLUCOSE BLD GHB EST-MCNC: 103 MG/DL
GLUCOSE UR STRIP-MCNC: NEGATIVE MG/DL
HBA1C MFR BLD: 5.2 %
HDLC SERPL-MCNC: 96 MG/DL
HGB UR QL STRIP.AUTO: NEGATIVE
KETONES UR STRIP-MCNC: NEGATIVE MG/DL
LDLC SERPL CALC-MCNC: 93 MG/DL (ref 0–100)
LEUKOCYTE ESTERASE UR QL STRIP: ABNORMAL
MUCOUS THREADS UR QL AUTO: ABNORMAL
NITRITE UR QL STRIP: POSITIVE
NON-SQ EPI CELLS URNS QL MICRO: ABNORMAL /HPF
NONHDLC SERPL-MCNC: 99 MG/DL
PH UR STRIP.AUTO: 5.5 [PH]
PROT UR STRIP-MCNC: NEGATIVE MG/DL
RBC #/AREA URNS AUTO: ABNORMAL /HPF
SP GR UR STRIP.AUTO: 1.01 (ref 1–1.03)
TRIGL SERPL-MCNC: 30 MG/DL
UROBILINOGEN UR STRIP-ACNC: <2 MG/DL
WBC #/AREA URNS AUTO: ABNORMAL /HPF
WBC CLUMPS # UR AUTO: PRESENT /UL

## 2022-06-25 PROCEDURE — 83036 HEMOGLOBIN GLYCOSYLATED A1C: CPT

## 2022-06-25 PROCEDURE — 36415 COLL VENOUS BLD VENIPUNCTURE: CPT

## 2022-06-25 PROCEDURE — 80061 LIPID PANEL: CPT

## 2022-06-25 PROCEDURE — 81001 URINALYSIS AUTO W/SCOPE: CPT | Performed by: INTERNAL MEDICINE

## 2022-07-13 ENCOUNTER — APPOINTMENT (OUTPATIENT)
Dept: LAB | Age: 47
End: 2022-07-13
Payer: COMMERCIAL

## 2022-07-13 ENCOUNTER — TELEPHONE (OUTPATIENT)
Dept: INTERNAL MEDICINE CLINIC | Facility: CLINIC | Age: 47
End: 2022-07-13

## 2022-07-13 DIAGNOSIS — R39.9 URINARY SYMPTOM OR SIGN: Primary | ICD-10-CM

## 2022-07-13 LAB
BILIRUB UR QL STRIP: NEGATIVE
CLARITY UR: CLEAR
COLOR UR: COLORLESS
GLUCOSE UR STRIP-MCNC: NEGATIVE MG/DL
HGB UR QL STRIP.AUTO: NEGATIVE
KETONES UR STRIP-MCNC: NEGATIVE MG/DL
LEUKOCYTE ESTERASE UR QL STRIP: NEGATIVE
NITRITE UR QL STRIP: NEGATIVE
PH UR STRIP.AUTO: 6.5 [PH]
PROT UR STRIP-MCNC: NEGATIVE MG/DL
SP GR UR STRIP.AUTO: 1.01 (ref 1–1.03)
UROBILINOGEN UR STRIP-ACNC: <2 MG/DL

## 2022-07-13 PROCEDURE — 81003 URINALYSIS AUTO W/O SCOPE: CPT

## 2022-07-13 NOTE — TELEPHONE ENCOUNTER
Urine test normal, no infection  Make sure you empty your bladder completely when urinating  Drink 50 to 60 oz of water daily      Limit acidic foods such as oranges, tomatoes, pineapples, spicy foods etc

## 2022-07-13 NOTE — TELEPHONE ENCOUNTER
Pt  finished antbx for uti about a week and a half ago  Still has bloating and some nausea  No pain when urinating, some days has some increased urgency to urinate, some days she doesn't  Has been taking Advil and Azo  Took some the day before yesterday, none yesterday or today  no other symptoms

## 2022-10-11 ENCOUNTER — TELEPHONE (OUTPATIENT)
Dept: NEUROLOGY | Facility: CLINIC | Age: 47
End: 2022-10-11

## 2022-10-11 NOTE — TELEPHONE ENCOUNTER
Attempted to call pt for reminder of appt with Irasema Redding on 10/25/22, no answer  Left a vm to call back when available

## 2022-10-21 NOTE — TELEPHONE ENCOUNTER
patient called to reschedule due to work  I offered her 3-14-23 at 8 am with Rachel Kraus and she accepted

## 2022-10-31 ENCOUNTER — ANNUAL EXAM (OUTPATIENT)
Dept: OBGYN CLINIC | Facility: CLINIC | Age: 47
End: 2022-10-31

## 2022-10-31 VITALS
WEIGHT: 126 LBS | SYSTOLIC BLOOD PRESSURE: 110 MMHG | BODY MASS INDEX: 23.19 KG/M2 | HEIGHT: 62 IN | DIASTOLIC BLOOD PRESSURE: 70 MMHG

## 2022-10-31 DIAGNOSIS — Z12.31 ENCOUNTER FOR SCREENING MAMMOGRAM FOR BREAST CANCER: ICD-10-CM

## 2022-10-31 DIAGNOSIS — Z01.419 ENCOUNTER FOR ANNUAL ROUTINE GYNECOLOGICAL EXAMINATION: Primary | ICD-10-CM

## 2022-10-31 DIAGNOSIS — Z78.0 MENOPAUSE: ICD-10-CM

## 2022-10-31 NOTE — PROGRESS NOTES
Assessment/Plan:  Pap done for cytology, reflex HPV  Encouraged self-breast examination as well as calcium supplementation  Continue annual mammogram   Reviewed colon cancer screening  Reviewed contraception, partner has had vasectomy  Implications of menopause reviewed  All questions answered  She will return to office in 1 year or p r n  No problem-specific Assessment & Plan notes found for this encounter  Diagnoses and all orders for this visit:    Encounter for annual routine gynecological examination  -     Liquid-based pap, screening    Encounter for screening mammogram for breast cancer  -     Mammo screening bilateral w 3d & cad; Future    Menopause          Subjective:      Patient ID: Dez Gaines is a 49 y o  female  HPI     This is a pleasant 41-year-old female P3 ( x3, age 21, 20, 20) presents as a new patient to our office for annual gyn exam   Her last menstrual cycle was 10/2021  She did have significant hot flashes and night sweats which have markedly improved  There has been no vaginal bleeding or spotting  She is sexually active and has been in a monogamous relationship for 7 years  Method of contraception is vasectomy  She has had a history of abnormal Pap smears  Last several Pap smears have been normal   Last Pap  revealing bacteria vaginosis, no endocervical cells  She denies any changes in bowel or bladder function  She does follow up with Neurology for history of migraine headaches uses Imitrex approximately 4 times per month  Patient employed full-time [employer], pediatric occupational therapist     The following portions of the patient's history were reviewed and updated as appropriate: allergies, current medications, past family history, past medical history, past social history, past surgical history and problem list     Review of Systems   Constitutional: Negative for fatigue, fever and unexpected weight change     Respiratory: Negative for cough, chest tightness, shortness of breath and wheezing  Cardiovascular: Negative  Negative for chest pain and palpitations  Gastrointestinal: Negative  Negative for abdominal distention, abdominal pain, blood in stool, constipation, diarrhea, nausea and vomiting  Genitourinary: Negative  Negative for difficulty urinating, dyspareunia, dysuria, flank pain, frequency, genital sores, hematuria, pelvic pain, urgency, vaginal bleeding, vaginal discharge and vaginal pain  Skin: Negative for rash  Objective:      /70   Ht 5' 2" (1 575 m)   Wt 57 2 kg (126 lb)   LMP 10/04/2021 (Approximate)   BMI 23 05 kg/m²          Physical Exam  Constitutional:       Appearance: Normal appearance  She is well-developed  Cardiovascular:      Rate and Rhythm: Normal rate and regular rhythm  Pulmonary:      Effort: Pulmonary effort is normal       Breath sounds: Normal breath sounds  Chest:   Breasts:      Right: No inverted nipple, mass, nipple discharge, skin change or tenderness  Left: No inverted nipple, mass, nipple discharge, skin change or tenderness  Abdominal:      General: Bowel sounds are normal  There is no distension  Palpations: Abdomen is soft  Tenderness: There is no abdominal tenderness  There is no guarding or rebound  Genitourinary:     Labia:         Right: No rash, tenderness or lesion  Left: No rash, tenderness or lesion  Vagina: Normal  No signs of injury  No vaginal discharge or tenderness  Cervix: No cervical motion tenderness, discharge, friability, lesion, erythema or cervical bleeding  Uterus: Not enlarged and not tender  Adnexa:         Right: No mass, tenderness or fullness  Left: No mass, tenderness or fullness  Neurological:      Mental Status: She is alert and oriented to person, place, and time     Psychiatric:         Behavior: Behavior normal

## 2022-11-05 LAB
LAB AP GYN PRIMARY INTERPRETATION: NORMAL
LAB AP LMP: NORMAL
Lab: NORMAL

## 2022-12-16 DIAGNOSIS — G43.709 CHRONIC MIGRAINE WITHOUT AURA WITHOUT STATUS MIGRAINOSUS, NOT INTRACTABLE: ICD-10-CM

## 2022-12-16 RX ORDER — SUMATRIPTAN 50 MG/1
50 TABLET, FILM COATED ORAL AS NEEDED
Qty: 9 TABLET | Refills: 4 | Status: SHIPPED | OUTPATIENT
Start: 2022-12-16

## 2023-01-09 ENCOUNTER — HOSPITAL ENCOUNTER (OUTPATIENT)
Dept: MAMMOGRAPHY | Facility: MEDICAL CENTER | Age: 48
Discharge: HOME | End: 2023-01-09

## 2023-01-09 VITALS — BODY MASS INDEX: 23.21 KG/M2 | HEIGHT: 62 IN | WEIGHT: 126.1 LBS

## 2023-01-09 DIAGNOSIS — Z12.31 ENCOUNTER FOR SCREENING MAMMOGRAM FOR BREAST CANCER: ICD-10-CM

## 2023-03-09 ENCOUNTER — TELEPHONE (OUTPATIENT)
Dept: NEUROLOGY | Facility: CLINIC | Age: 48
End: 2023-03-09

## 2023-03-14 ENCOUNTER — OFFICE VISIT (OUTPATIENT)
Dept: NEUROLOGY | Facility: CLINIC | Age: 48
End: 2023-03-14

## 2023-03-14 VITALS
BODY MASS INDEX: 22.73 KG/M2 | DIASTOLIC BLOOD PRESSURE: 75 MMHG | WEIGHT: 124.3 LBS | SYSTOLIC BLOOD PRESSURE: 115 MMHG | HEART RATE: 59 BPM

## 2023-03-14 DIAGNOSIS — G43.009 MIGRAINE WITHOUT AURA AND WITHOUT STATUS MIGRAINOSUS, NOT INTRACTABLE: Primary | ICD-10-CM

## 2023-03-14 DIAGNOSIS — G43.829 MENSTRUAL MIGRAINE WITHOUT STATUS MIGRAINOSUS, NOT INTRACTABLE: ICD-10-CM

## 2023-03-14 NOTE — PATIENT INSTRUCTIONS
Chronic Migraines without aura and without status:    Patient Instructions:    Headache Calendar  Please maintain a headache calendar  Consider using phone applications such as Migraine Kaleb or Migraine Diary    Headache/migraine treatment:     Rescue medications (for immediate treatment of a headache): It is ok to take ibuprofen, acetaminophen or naproxen (Advil, Tylenol,  Aleve, Excedrin) if they help your headaches you should limit these to No more than 3 times a week to avoid medication overuse/rebound headaches  For your more moderate to severe migraines take this medication early   Sumatriptan 50 mg tabs - take one at the onset of headache  May repeat one time after 2 hours if pain has not resolved  (Max 2 a day and 9 a month)       Over the counter preventive supplements for headaches/migraines (if you try, try for 3 months straight)  (to take every day to help prevent headaches - not to take at the time of headache): There are combo pills online of these - none of which regulated by FDA and double check dosing - take appropriate dose only once a day- prevent a migraine, migravent, mind ease, migrelief   [x] Magnesium 400mg daily (If any diarrhea or upset stomach, decrease dose  as tolerated)  [x] Riboflavin (Vitamin B2) 400mg daily (may make your urine bright/neon yellow)    - Continue to take magnesium, co-Q10, vitamin D B2 for migraine preventative therapy   -Continue to take vitamin B12 and vitamin D as well  Lifestyle Recommendations:  [x] SLEEP - Maintain a regular sleep schedule: Adults need at least 7-8 hours of uninterrupted a night  Maintain good sleep hygiene:  Going to bed and waking up at consistent times, avoiding excessive daytime naps, avoiding caffeinated beverages in the evening, avoid excessive stimulation in the evening and generally using bed primarily for sleeping    One hour before bedtime would recommend turning lights down lower, decreasing your activity (may read quietly, listen to music at a low volume)  When you get into bed, should eliminate all technology (no texting, emailing, playing with your phone, iPad or tablet in bed)  [x] HYDRATION - Maintain good hydration  Drink  2L of fluid a day (4 typical small water bottles)  [x] DIET - Maintain good nutrition  In particular don't skip meals and try and eat healthy balanced meals regularly  [x] TRIGGERS - Look for other triggers and avoid them: Limit caffeine to 1-2 cups a day or less  Avoid dietary triggers that you have noticed bring on your headaches (this could include aged cheese, peanuts, MSG, aspartame and nitrates)  [x] EXERCISE - physical exercise as we all know is good for you in many ways, and not only is good for your heart, but also is beneficial for your mental health, cognitive health and  chronic pain/headaches  I would encourage at the least 5 days of physical exercise weekly for at least 30 minutes  Education and Follow-up  [x] Please call with any questions or concerns  Of course if any new concerning symptoms go to the emergency department    [x] Follow up in 1 year with Alejandro Weir

## 2023-03-14 NOTE — PROGRESS NOTES
[facility]  PATIENT:  Rosa Law  MRN:  [id number]  :  1975  DATE OF SERVICE:  3/14/2023      Assessment/Plan:     Chronic Migraines without aura and without status:    Patient Instructions:    Headache Calendar  Please maintain a headache calendar  Consider using phone applications such as Migraine Kaleb or Migraine Diary    Headache/migraine treatment:     Rescue medications (for immediate treatment of a headache): It is ok to take ibuprofen, acetaminophen or naproxen (Advil, Tylenol,  Aleve, Excedrin) if they help your headaches you should limit these to No more than 3 times a week to avoid medication overuse/rebound headaches  For your more moderate to severe migraines take this medication early   Sumatriptan 50 mg tabs - take one at the onset of headache  May repeat one time after 2 hours if pain has not resolved  (Max 2 a day and 9 a month)       Over the counter preventive supplements for headaches/migraines (if you try, try for 3 months straight)  (to take every day to help prevent headaches - not to take at the time of headache): There are combo pills online of these - none of which regulated by FDA and double check dosing - take appropriate dose only once a day- prevent a migraine, migravent, mind ease, migrelief   [x] Magnesium 400mg daily (If any diarrhea or upset stomach, decrease dose  as tolerated)  [x] Riboflavin (Vitamin B2) 400mg daily (may make your urine bright/neon yellow)    - Continue to take magnesium, co-Q10, vitamin D B2 for migraine preventative therapy   -Continue to take vitamin B12 and vitamin D as well  Lifestyle Recommendations:  [x] SLEEP - Maintain a regular sleep schedule: Adults need at least 7-8 hours of uninterrupted a night   Maintain good sleep hygiene:  Going to bed and waking up at consistent times, avoiding excessive daytime naps, avoiding caffeinated beverages in the evening, avoid excessive stimulation in the evening and generally using bed primarily for sleeping  One hour before bedtime would recommend turning lights down lower, decreasing your activity (may read quietly, listen to music at a low volume)  When you get into bed, should eliminate all technology (no texting, emailing, playing with your phone, iPad or tablet in bed)  [x] HYDRATION - Maintain good hydration  Drink  2L of fluid a day (4 typical small water bottles)  [x] DIET - Maintain good nutrition  In particular don't skip meals and try and eat healthy balanced meals regularly  [x] TRIGGERS - Look for other triggers and avoid them: Limit caffeine to 1-2 cups a day or less  Avoid dietary triggers that you have noticed bring on your headaches (this could include aged cheese, peanuts, MSG, aspartame and nitrates)  [x] EXERCISE - physical exercise as we all know is good for you in many ways, and not only is good for your heart, but also is beneficial for your mental health, cognitive health and  chronic pain/headaches  I would encourage at the least 5 days of physical exercise weekly for at least 30 minutes  Education and Follow-up  [x] Please call with any questions or concerns  Of course if any new concerning symptoms go to the emergency department  [x] Follow up in 1 year with [facility]  History of Present Illness: We had the pleasure of evaluating Laura Shin in neurological follow up  today for headaches  As you know,  she is a 49 y o   right handed female  She had previously been seen Teressa Tang PA-C for the management of her migraines  Patient has been taking coenzyme every 10, vitamin B2 and magnesium for prevention  At the onset of migraine she takes 1/2-1 full tablet of sumatriptan 50 mg  She may repeat this dose 2 hours later  Appears that the patient had an vitamin D and vitamin B12 labs repeated at the last visit  Most recent B12 and vitamin D labs were within normal range  Current medical illnesses:  Allergic rhinitis, migraine without aura and without status, menstrual migraine, tear of the lateral meniscus of the right knee, rupture of the ACL of the right knee, vitamin D deficiency, pure hypercholesterolemia, anxiety    History Tobacco use: none    What medications do you take or have you taken for your headaches? Current Preventive:   Co-Q10, B12, magnesium, vitamin B2  Current Abortive:   Sumatriptan    Prior Preventive:   N/A    Prior Abortive:   Ibuprofen    Interval updates as of 3/14/2023:  Patient states that her migraines are doing much better and are relatively stable  She reports that weather changes are a big trigger for her, especially with the winter we have had with fluctuating temperatures every day  Having a migraine about once every 2 weeks and the patient will only take about a half a tablet of sumatriptan for migraine relief, this seems to work almost 100% of the time for her migraines  Still taking magnesium, co-Q10, and vitamin B2 for preventative therapy  Patient feels as though her migraines may be slowing down, in the past she had a significant correlation to her menstrual cycle with her migraines  As she approaches the perimenopausal age she has noticed that they have gotten slightly better  Patient was concerned about the long-term effects of taking sumatriptan  Educated her to not take more than 9 sumatriptan in the month for the fear of rebound headaches or potential cardiovascular effects  Did relay to the patient that as long as she does not have significantly elevated blood pressure, history of stroke, or history of cardiovascular disease no real contraindications to taking sumatriptan for a significant amount of time  What is your current pain level ?  no  How often do the headaches occur?    Sometimes once every two weeks, sometimes more frequent  Are you ever headache free? yes    Headache history with updates:  Headache are worse if the patient: worse with significant weather changes and allergies  Headache triggers:  Weather changes, not eating, dehydration, not enough sleep, wine    Aura/warning and how long does it last ? No auras with migraines    What time of the day do the headaches start? No particular time of day, use to get them more in mornings but Flonase seems to help get rid of these ones when she takes Flonase at night    How long do the headaches last?   Always catching it with sumatriptan, will completely get rid of pain in half an hour to hour    Describe your usual headache ?  dull achy type pain    Where is your headache located? Always on the left side in front of the head    What is the intensity of pain? Mild/moderate: 6/10  Severe: 10/10 never lets it get to this point    Associated symptoms:   - Photophobia, phonophobia  - Problem with concentration  - Nausea  - prefer to be alone and in a dark room, unable to work    Number of days missed per month because of headaches:  Work (or school) days: 0 days  Social or Family activities: 0 days    What time of the year do headaches occur more frequently? do not seem to be related to any time of the year  Have you seen someone else for headaches or pain? Yes, Watson Yu  Have you had trigger point injection performed and how often? No  Have you had Botox injection performed and how often? No   Have you had epidural injections or transforaminal injections performed? No      Are you current pregnant or planning on getting pregnant? No    Have you ever had any Brain imaging? 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   • HPV in female   • Menstrual migraine   • Migraine without aura and without status migrainosus, not intractable   • Situational anxiety   • Prediabetes   • Allergic rhinitis   • Pure hypercholesterolemia   • Vitamin D deficiency   • Trigeminal autonomic cephalgias   • Rupture of anterior cruciate ligament of right knee   • Sprain of medial collateral ligament of right knee   • Tear of lateral meniscus of right knee, current       Medications:      Current Outpatient Medications   Medication Sig Dispense Refill   • acetaminophen (TYLENOL) 325 mg tablet Take 650 mg by mouth every 6 (six) hours as needed for mild pain     • albuterol (PROVENTIL HFA,VENTOLIN HFA) 90 mcg/act inhaler Inhale 2 puffs every 6 (six) hours as needed for wheezing 8 g 1   • ALPRAZolam (XANAX) 0 25 mg tablet Use 1/2 pill daily as needed, use sparingly 20 tablet 0   • Cholecalciferol (D3 PO) Take by mouth     • Coenzyme Q10 (CO Q-10 PO) Take by mouth     • Cyanocobalamin (B-12 PO) Take by mouth     • fluticasone (FLONASE) 50 mcg/act nasal spray 1 spray into each nostril daily 48 g 1   • ibuprofen (MOTRIN) 200 mg tablet Take 400 mg by mouth every 6 (six) hours as needed for mild pain     • MAGNESIUM CARBONATE PO Take by mouth     • predniSONE 10 mg tablet Take 4 tabs PO daily x 3 days, 3 tablets daily x 3 days, 2 tabs Po daily x 3 days, and 1 tab Po daily x 3 days  can take in am with food  (Patient not taking: Reported on 10/31/2022) 30 tablet 0   • SUMAtriptan (IMITREX) 50 mg tablet Take 1 tablet (50 mg total) by mouth as needed for migraine May repeat in 2 hours 9 tablet 4     No current facility-administered medications for this visit          Allergies:    No Known Allergies    Family History:     Family History   Problem Relation Age of Onset   • Hyperlipidemia Mother         Pure hypocholesterolemia   • Aortic aneurysm Father    • Hypertension Father    • Osteoarthritis Father    • No Known Problems Sister    • No Known Problems Daughter    • Cancer Maternal Grandfather         unknown type or dx age   • Hypertension Paternal Grandmother    • Parkinsonism Paternal Grandfather    • Hypertension Paternal Grandfather    • No Known Problems Maternal Aunt    • Leukemia Cousin 21   • Breast cancer Neg Hx    • Ovarian cancer Neg Hx        Social History:     Social History     Socioeconomic History   • Marital status: /Civil Union     Spouse name: Not on file   • Number of children: 3   • Years of education: Not on file   • Highest education level: Not on file   Occupational History   • Occupation: Full-time employment   Tobacco Use   • Smoking status: Never   • Smokeless tobacco: Never   Vaping Use   • Vaping Use: Never used   Substance and Sexual Activity   • Alcohol use: Yes     Comment: socially beer and wine   • Drug use: No   • Sexual activity: Yes     Partners: Male     Birth control/protection: Male Sterilization   Other Topics Concern   • Not on file   Social History Narrative    Caffeine use 2-3 cups/day        3 children 12, 23, 24    Working - OT assistant for [employer]  Determinants of Health     Financial Resource Strain: Not on file   Food Insecurity: Not on file   Transportation Needs: Not on file   Physical Activity: Not on file   Stress: Not on file   Social Connections: Not on file   Intimate Partner Violence: Not on file   Housing Stability: Not on file         Objective:     Physical Exam:                                                                 Vitals:            Constitutional:    /75 (BP Location: Left arm, Patient Position: Sitting, Cuff Size: Adult)   Pulse 59   Wt 56 4 kg (124 lb 4 8 oz)   BMI 22 73 kg/m²   BP Readings from Last 3 Encounters:   03/14/23 115/75   10/31/22 110/70   11/23/21 121/79     Pulse Readings from Last 3 Encounters:   03/14/23 59   11/23/21 67   10/15/21 57         Well developed, well nourished, well groomed   No dysmorphic features  Psychiatric:  Normal behavior and appropriate affect        Neurological Examination:     Mental status/cognitive function:   Orientated to time, place and person  Recent and remote memory intact  Attention span and concentration as well as fund of knowledge are appropriate for age  Normal language and spontaneous speech  Cranial Nerves:  II-visual fields full  III, IV, VI-Pupils were equal, round, and reactive to light and accomodation  Extraocular movements were full and conjugate without nystagmus  Conjugate gaze, normal smooth pursuits, normal saccades   V-facial sensation symmetric  VII-facial expression symmetric, intact forehead wrinkle, strong eye closure, symmetric smile    VIII-hearing grossly intact bilaterally   IX, X-palate elevation symmetric, no dysarthria  XI-shoulder shrug strength intact    XII-tongue protrusion midline  Motor Exam: symmetric bulk and tone throughout, no pronator drift  Power/strength 5/5 bilateral upper and lower extremities, no atrophy, fasciculations or abnormal movements noted  Sensory: grossly intact light touch in all extremities  Gait: steady casual and tandem gait  Review of Systems:   Review of Systems   Constitutional: Negative  Negative for appetite change and fever  HENT: Negative  Negative for hearing loss, tinnitus, trouble swallowing and voice change  Eyes: Negative  Negative for photophobia, pain and visual disturbance  Respiratory: Negative  Negative for shortness of breath  Cardiovascular: Negative  Negative for palpitations  Gastrointestinal: Negative  Negative for nausea and vomiting  Endocrine: Negative  Negative for cold intolerance  Genitourinary: Negative  Negative for dysuria, frequency and urgency  Musculoskeletal: Negative  Negative for gait problem, myalgias and neck pain  Skin: Negative  Negative for rash  Allergic/Immunologic: Negative      Neurological: Positive for headaches (migraine 1 every 2 weeks )  Negative for dizziness, tremors, seizures, syncope, facial asymmetry, speech difficulty, weakness, light-headedness and numbness  Hematological: Negative  Does not bruise/bleed easily  Psychiatric/Behavioral: Negative  Negative for confusion, hallucinations and sleep disturbance  All other systems reviewed and are negative  I personally reviewed the ROS entered by the MA    I spent 25 minutes in total time for this visit      Author:  Tyrese Rouse PA-C 3/14/2023 7:31 AM

## 2023-03-14 NOTE — PROGRESS NOTES
ROS:    Review of Systems   Constitutional: Negative  Negative for appetite change and fever  HENT: Negative  Negative for hearing loss, tinnitus, trouble swallowing and voice change  Eyes: Negative  Negative for photophobia, pain and visual disturbance  Respiratory: Negative  Negative for shortness of breath  Cardiovascular: Negative  Negative for palpitations  Gastrointestinal: Negative  Negative for nausea and vomiting  Endocrine: Negative  Negative for cold intolerance  Genitourinary: Negative  Negative for dysuria, frequency and urgency  Musculoskeletal: Negative  Negative for gait problem, myalgias and neck pain  Skin: Negative  Negative for rash  Allergic/Immunologic: Negative  Neurological: Positive for headaches (migraine 1 every 2 weeks )  Negative for dizziness, tremors, seizures, syncope, facial asymmetry, speech difficulty, weakness, light-headedness and numbness  Hematological: Negative  Does not bruise/bleed easily  Psychiatric/Behavioral: Negative  Negative for confusion, hallucinations and sleep disturbance  All other systems reviewed and are negative

## 2023-03-17 ENCOUNTER — OFFICE VISIT (OUTPATIENT)
Dept: INTERNAL MEDICINE CLINIC | Facility: CLINIC | Age: 48
End: 2023-03-17

## 2023-03-17 VITALS
TEMPERATURE: 96.3 F | OXYGEN SATURATION: 99 % | HEART RATE: 53 BPM | DIASTOLIC BLOOD PRESSURE: 64 MMHG | SYSTOLIC BLOOD PRESSURE: 110 MMHG | HEIGHT: 62 IN | BODY MASS INDEX: 22.08 KG/M2 | WEIGHT: 120 LBS

## 2023-03-17 DIAGNOSIS — Z00.00 HEALTH MAINTENANCE EXAMINATION: Primary | ICD-10-CM

## 2023-03-17 DIAGNOSIS — E55.9 VITAMIN D DEFICIENCY: ICD-10-CM

## 2023-03-17 DIAGNOSIS — Z00.00 LABORATORY EXAMINATION ORDERED AS PART OF A ROUTINE GENERAL MEDICAL EXAMINATION: ICD-10-CM

## 2023-03-17 DIAGNOSIS — M79.645 PAIN OF LEFT THUMB: ICD-10-CM

## 2023-03-17 DIAGNOSIS — G43.009 MIGRAINE WITHOUT AURA AND WITHOUT STATUS MIGRAINOSUS, NOT INTRACTABLE: ICD-10-CM

## 2023-03-17 DIAGNOSIS — R73.03 PREDIABETES: ICD-10-CM

## 2023-03-17 DIAGNOSIS — M79.675 TOE PAIN, LEFT: ICD-10-CM

## 2023-03-17 DIAGNOSIS — J30.89 ALLERGIC RHINITIS DUE TO OTHER ALLERGIC TRIGGER, UNSPECIFIED SEASONALITY: ICD-10-CM

## 2023-03-17 DIAGNOSIS — Z12.11 SCREENING FOR COLON CANCER: ICD-10-CM

## 2023-03-17 DIAGNOSIS — E53.8 VITAMIN B12 DEFICIENCY: ICD-10-CM

## 2023-03-17 DIAGNOSIS — E78.00 PURE HYPERCHOLESTEROLEMIA: ICD-10-CM

## 2023-03-17 DIAGNOSIS — Z82.49: ICD-10-CM

## 2023-03-17 RX ORDER — FLUTICASONE PROPIONATE 50 MCG
1 SPRAY, SUSPENSION (ML) NASAL DAILY
Qty: 48 G | Refills: 1 | Status: SHIPPED | OUTPATIENT
Start: 2023-03-17

## 2023-03-17 NOTE — PROGRESS NOTES
Assessment/Plan:    Migraine without aura and without status migrainosus, not intractable  Continue Mg, B2  Has Imitrex to take prrn  Followed by neurology  Pure hypercholesterolemia  Lipids improved  Prediabetes  Previous A1c normal     Vitamin B12 deficiency  Restart B12 supplement  Vitamin D deficiency  Taking D3 daily  Allergic rhinitis  Has Flonase to use prn  Normal sinus CT last year  Saw ENT  Situational anxiety  Has not needed alprazolam     Rupture of anterior cruciate ligament of right knee  Follow up with Ortho  Diagnoses and all orders for this visit:    Health maintenance examination  Comments:  Agrees to do Cologuard  Pain of left thumb  Comments:  Suspect OA  May try using thumb splint prn  Toe pain, left  Comments:  Recommend wider shoes  Given info for podiatry  Migraine without aura and without status migrainosus, not intractable    Prediabetes  -     Hemoglobin A1C    Pure hypercholesterolemia  -     Comprehensive metabolic panel  -     Lipid panel  -     TSH, 3rd generation with Free T4 reflex    Vitamin B12 deficiency  -     CBC and differential  -     Vitamin B12    Vitamin D deficiency  -     Vitamin D 25 hydroxy    Allergic rhinitis due to other allergic trigger, unspecified seasonality  -     fluticasone (FLONASE) 50 mcg/act nasal spray; 1 spray into each nostril daily    Family history of left bundle branch block  -     ECG 12 lead; Future    Laboratory examination ordered as part of a routine general medical examination  -     CBC and differential  -     Comprehensive metabolic panel  -     Hemoglobin A1C  -     Lipid panel  -     TSH, 3rd generation with Free T4 reflex  -     Vitamin B12  -     Vitamin D 25 hydroxy    Screening for colon cancer  -     Cologuard    Follow up in 1 year or as needed  Subjective:      Patient ID: Shama Carranza is a 49 y o  female here for a physical     She is asking if she can take Flonase every night   It helps with the congestion, can sleep and breath better at night  She complains of ongoing right knee pain/discomfort  She did not have surgery, will see Ortho again  She noticed intermittent left thumb pain  No injury or swelling  She is right handed  She also noticed pain on her left big toe area, trying to avoid a bunion  She is trying to prevent further injury or arthritis if possible  She reports migraine headaches are not as bad, does not last as long  She did take Imitrex a few days ago  She takes the vitamins most days of the week  She reports her sister was diagnosed with a bundle branch block, brother also, with the "bad one " She denies any chest pain, palpitations or other symptoms  Her father had an aneurysm which was repaired  The following portions of the patient's history were reviewed and updated as appropriate: allergies, current medications, past medical history, past social history and problem list     Review of Systems   Constitutional: Negative for appetite change and fatigue  HENT: Positive for postnasal drip and rhinorrhea  Negative for congestion and ear pain  Eyes: Negative for visual disturbance  Respiratory: Negative for cough and shortness of breath  Cardiovascular: Negative for chest pain and leg swelling  Gastrointestinal: Negative for abdominal pain, constipation and diarrhea  Genitourinary: Negative for dysuria, frequency and urgency  Musculoskeletal: Positive for arthralgias  Negative for joint swelling and myalgias  Skin: Negative for rash and wound  Neurological: Negative for dizziness, numbness and headaches  Psychiatric/Behavioral: Negative for confusion and sleep disturbance  The patient is not nervous/anxious  Objective:      /64   Pulse (!) 53   Temp (!) 96 3 °F (35 7 °C)   Ht 5' 2" (1 575 m)   Wt 54 4 kg (120 lb)   SpO2 99%   BMI 21 95 kg/m²          Physical Exam  Vitals and nursing note reviewed     Constitutional: General: She is not in acute distress  Appearance: She is well-developed  HENT:      Head: Normocephalic and atraumatic  Right Ear: Tympanic membrane, ear canal and external ear normal       Left Ear: Tympanic membrane, ear canal and external ear normal       Nose: Nose normal  No congestion or rhinorrhea  Mouth/Throat:      Mouth: Mucous membranes are moist    Eyes:      Pupils: Pupils are equal, round, and reactive to light  Cardiovascular:      Rate and Rhythm: Normal rate and regular rhythm  Heart sounds: Normal heart sounds  Pulmonary:      Effort: Pulmonary effort is normal       Breath sounds: Normal breath sounds  No wheezing  Abdominal:      General: Bowel sounds are normal       Palpations: Abdomen is soft  Musculoskeletal:         General: No swelling  Left hand: Normal  No swelling  Right lower leg: No edema  Left lower leg: No edema  Skin:     General: Skin is warm  Findings: No rash  Neurological:      General: No focal deficit present  Mental Status: She is alert and oriented to person, place, and time  Psychiatric:         Mood and Affect: Mood and affect normal  Mood is not anxious or depressed  Behavior: Behavior normal            Labs & imaging results reviewed with patient

## 2023-04-07 ENCOUNTER — APPOINTMENT (OUTPATIENT)
Dept: LAB | Facility: CLINIC | Age: 48
End: 2023-04-07

## 2023-04-07 LAB
25(OH)D3 SERPL-MCNC: 41.3 NG/ML (ref 30–100)
ALBUMIN SERPL BCP-MCNC: 3.7 G/DL (ref 3.5–5)
ALP SERPL-CCNC: 47 U/L (ref 46–116)
ALT SERPL W P-5'-P-CCNC: 19 U/L (ref 12–78)
ANION GAP SERPL CALCULATED.3IONS-SCNC: 2 MMOL/L (ref 4–13)
AST SERPL W P-5'-P-CCNC: 16 U/L (ref 5–45)
BASOPHILS # BLD AUTO: 0.03 THOUSANDS/ÂΜL (ref 0–0.1)
BASOPHILS NFR BLD AUTO: 1 % (ref 0–1)
BILIRUB SERPL-MCNC: 0.54 MG/DL (ref 0.2–1)
BUN SERPL-MCNC: 11 MG/DL (ref 5–25)
CALCIUM SERPL-MCNC: 9 MG/DL (ref 8.3–10.1)
CHLORIDE SERPL-SCNC: 105 MMOL/L (ref 96–108)
CHOLEST SERPL-MCNC: 186 MG/DL
CO2 SERPL-SCNC: 29 MMOL/L (ref 21–32)
CREAT SERPL-MCNC: 0.87 MG/DL (ref 0.6–1.3)
EOSINOPHIL # BLD AUTO: 0.17 THOUSAND/ÂΜL (ref 0–0.61)
EOSINOPHIL NFR BLD AUTO: 4 % (ref 0–6)
ERYTHROCYTE [DISTWIDTH] IN BLOOD BY AUTOMATED COUNT: 12.7 % (ref 11.6–15.1)
GFR SERPL CREATININE-BSD FRML MDRD: 79 ML/MIN/1.73SQ M
GLUCOSE P FAST SERPL-MCNC: 100 MG/DL (ref 65–99)
HCT VFR BLD AUTO: 39.7 % (ref 34.8–46.1)
HDLC SERPL-MCNC: 91 MG/DL
HGB BLD-MCNC: 12.3 G/DL (ref 11.5–15.4)
IMM GRANULOCYTES # BLD AUTO: 0.01 THOUSAND/UL (ref 0–0.2)
IMM GRANULOCYTES NFR BLD AUTO: 0 % (ref 0–2)
LDLC SERPL CALC-MCNC: 87 MG/DL (ref 0–100)
LYMPHOCYTES # BLD AUTO: 1.66 THOUSANDS/ÂΜL (ref 0.6–4.47)
LYMPHOCYTES NFR BLD AUTO: 42 % (ref 14–44)
MCH RBC QN AUTO: 29.9 PG (ref 26.8–34.3)
MCHC RBC AUTO-ENTMCNC: 31 G/DL (ref 31.4–37.4)
MCV RBC AUTO: 97 FL (ref 82–98)
MONOCYTES # BLD AUTO: 0.33 THOUSAND/ÂΜL (ref 0.17–1.22)
MONOCYTES NFR BLD AUTO: 8 % (ref 4–12)
NEUTROPHILS # BLD AUTO: 1.8 THOUSANDS/ÂΜL (ref 1.85–7.62)
NEUTS SEG NFR BLD AUTO: 45 % (ref 43–75)
NONHDLC SERPL-MCNC: 95 MG/DL
NRBC BLD AUTO-RTO: 0 /100 WBCS
PLATELET # BLD AUTO: 233 THOUSANDS/UL (ref 149–390)
PMV BLD AUTO: 10.5 FL (ref 8.9–12.7)
POTASSIUM SERPL-SCNC: 4.5 MMOL/L (ref 3.5–5.3)
PROT SERPL-MCNC: 7 G/DL (ref 6.4–8.4)
RBC # BLD AUTO: 4.11 MILLION/UL (ref 3.81–5.12)
SODIUM SERPL-SCNC: 136 MMOL/L (ref 135–147)
TRIGL SERPL-MCNC: 38 MG/DL
TSH SERPL DL<=0.05 MIU/L-ACNC: 2.4 UIU/ML (ref 0.45–4.5)
VIT B12 SERPL-MCNC: 396 PG/ML (ref 100–900)
WBC # BLD AUTO: 4 THOUSAND/UL (ref 4.31–10.16)

## 2023-04-08 LAB
EST. AVERAGE GLUCOSE BLD GHB EST-MCNC: 105 MG/DL
HBA1C MFR BLD: 5.3 %

## 2023-06-23 ENCOUNTER — OFFICE VISIT (OUTPATIENT)
Dept: PHYSICAL THERAPY | Facility: OTHER | Age: 48
End: 2023-06-23
Payer: COMMERCIAL

## 2023-06-23 DIAGNOSIS — M54.50 LUMBAR BACK PAIN: ICD-10-CM

## 2023-06-23 DIAGNOSIS — G89.29 CHRONIC PAIN OF RIGHT KNEE: Primary | ICD-10-CM

## 2023-06-23 DIAGNOSIS — M25.561 CHRONIC PAIN OF RIGHT KNEE: Primary | ICD-10-CM

## 2023-06-23 PROCEDURE — 97161 PT EVAL LOW COMPLEX 20 MIN: CPT | Performed by: PHYSICAL THERAPIST

## 2023-06-23 NOTE — PROGRESS NOTES
PT Evaluation     Today's date: 2023  Patient name: Teddy Calzada  : 1975  MRN: [id number]  Referring provider: Caroline Spain, *  Dx:   Encounter Diagnosis     ICD-10-CM    1  Chronic pain of right knee  M25 561     G89 29       2  Lumbar back pain  M54 50                      Assessment  Assessment details: Teddy Calzada is a pleasant 49 y o  female who presents with knee pain  She reported ACL and meniscus tear in  skiing injury  She had rehab then  She also has some back pan at the end of the day  Goal to have less pain at the end of the day  HEP issued and POC reviewed  Impairments: abnormal coordination, abnormal muscle firing, abnormal or restricted ROM, activity intolerance, impaired physical strength, lacks appropriate home exercise program, pain with function and poor body mechanics    Symptom irritability: moderateUnderstanding of Dx/Px/POC: good   Prognosis: good    Goals  Short Term:  Pt will report decreased levels of pain by at least 2 subjective ratings in 4 weeks  Pt will demonstrate improved ROM by at least 10 degrees in 4 weeks  Pt will demonstrate improved strength by 1/2 grade  Long Term:   Pt will be independent in their HEP in 8 weeks  Pt will be be pain free with IADL's  Pt will demonstrate improved FOTO, > 80         Plan  Plan details: Prognosis above is given PT services 2x/week tapering to 1x/week over the next 3 months and home program adherence    Patient would benefit from: skilled physical therapy  Planned modality interventions: thermotherapy: hydrocollator packs  Planned therapy interventions: activity modification, joint mobilization, manual therapy, motor coordination training, neuromuscular re-education, patient education, self care, therapeutic activities, therapeutic exercise, graded activity and home exercise program  Frequency: 2x week  Treatment plan discussed with: patient        Subjective Evaluation    Pain  At worst pain ratin          Objective     General Comments:      Lumbar Comments  Hypomobility L-S     Limited ROM press up  No N/T       Knee Comments  Mild laxity lachman  No pain with meniscus testing  Strength 4/5 hip knee     Pain with step down  Early heel rise with squat                Precautions:       Manuals 6 23 23                                                                Neuro Re-Ed                                                                                                        Ther Ex                                                                                                                     Ther Activity                                       Gait Training                                       Modalities

## 2023-07-07 ENCOUNTER — TELEPHONE (OUTPATIENT)
Dept: INTERNAL MEDICINE CLINIC | Facility: CLINIC | Age: 48
End: 2023-07-07

## 2023-07-07 ENCOUNTER — OFFICE VISIT (OUTPATIENT)
Dept: PHYSICAL THERAPY | Facility: OTHER | Age: 48
End: 2023-07-07
Payer: COMMERCIAL

## 2023-07-07 DIAGNOSIS — G89.29 CHRONIC PAIN OF RIGHT KNEE: Primary | ICD-10-CM

## 2023-07-07 DIAGNOSIS — M54.50 LUMBAR BACK PAIN: ICD-10-CM

## 2023-07-07 DIAGNOSIS — M25.561 CHRONIC PAIN OF RIGHT KNEE: Primary | ICD-10-CM

## 2023-07-07 PROCEDURE — 97112 NEUROMUSCULAR REEDUCATION: CPT | Performed by: PHYSICAL THERAPIST

## 2023-07-07 PROCEDURE — 97110 THERAPEUTIC EXERCISES: CPT | Performed by: PHYSICAL THERAPIST

## 2023-07-07 PROCEDURE — 97140 MANUAL THERAPY 1/> REGIONS: CPT | Performed by: PHYSICAL THERAPIST

## 2023-07-07 NOTE — PROGRESS NOTES
Daily Note     Today's date: 2023  Patient name: Lillian Cardona  : 1975  MRN: [id number]  Referring provider: Latrell Matthews, *  Dx:   Encounter Diagnosis     ICD-10-CM    1. Chronic pain of right knee  M25.561     G89.29       2. Lumbar back pain  M54.50                      Subjective: Foot pain B/L started about 1 year ago. Around the same time as the back and hip pain. Other than pes planus and mild TTP over the met heads we discuss her follow up with PCP to R/O inflammatory cause of the pain. She was in agreement with this plan. Possible custom orthotic with a met bar may help to off load the area. We will address the other deficits while she waits for a follow up as well with PCP. Objective: See treatment diary below      Assessment: Tolerated treatment well. Patient demonstrated fatigue post treatment      Plan: Continue per plan of care. Precautions:       Manuals 7.7.23            Prone IR/ER SIJ mobilization B/L  MJ             PA mobs.   MJ             karon lying Mobiliztion L-S  MJ                          Neuro Re-Ed             MWM DF  5''x10             sldier  5x             [place] yoga  5''x10             Sports cord              LAQ              hamcurl                           Ther Ex             Bike              Press up  [employer] tretch  30''x3                                                                              Ther Activity                                       Gait Training                                       Modalities

## 2023-07-07 NOTE — TELEPHONE ENCOUNTER
Patient has been going to PT for various things, pain in back and feet and torn ligaments in knee.      PT wanted her to ask her PCP if there is any BW that we would like to rule anything out and make sure there isn't more going on

## 2023-07-14 ENCOUNTER — OFFICE VISIT (OUTPATIENT)
Dept: PHYSICAL THERAPY | Facility: OTHER | Age: 48
End: 2023-07-14
Payer: COMMERCIAL

## 2023-07-14 DIAGNOSIS — G89.29 CHRONIC PAIN OF RIGHT KNEE: Primary | ICD-10-CM

## 2023-07-14 DIAGNOSIS — M25.561 CHRONIC PAIN OF RIGHT KNEE: Primary | ICD-10-CM

## 2023-07-14 PROCEDURE — 97112 NEUROMUSCULAR REEDUCATION: CPT | Performed by: PHYSICAL THERAPIST

## 2023-07-14 PROCEDURE — 97110 THERAPEUTIC EXERCISES: CPT | Performed by: PHYSICAL THERAPIST

## 2023-07-14 PROCEDURE — 97140 MANUAL THERAPY 1/> REGIONS: CPT | Performed by: PHYSICAL THERAPIST

## 2023-07-14 NOTE — PROGRESS NOTES
Daily Note     Today's date: 2023  Patient name: Jossie Alexander  : 1975  MRN: [id number]  Referring provider: Nithya Saenz, *  Dx:   Encounter Diagnosis     ICD-10-CM    1. Chronic pain of right knee  M25.561     G89.29                      Subjective: less LBP last week. Evaluated for custom orthotic we will trial a met bar. Objective: See treatment diary below      Assessment: Tolerated treatment well. Patient demonstrated fatigue post treatment      Plan: Continue per plan of care. Precautions:       Manuals 7..23 7..           Prone IR/ER SIJ mobilization B/L  MJ  MJ           PA mobs.   MJ  MJ            karon lying Mobiliztion L-S  GUICHO  MJ                         Neuro Re-Ed             Side step over bosu   20x2            Rocker board toss   20x2            MWM DF  5''x10  5''x10            sldier  5x  5x            [place] yoga  5''x10  5''x10            Sports cord   5 each            LAQ   purple 15x2            hamcurl              Bird dogs   25x           Ther Ex             Bike   5min            Press up  10c  10x            Slant boards tretch  30''x3  30''x3                                                                             Ther Activity                                       Gait Training                                       Modalities

## 2023-07-21 ENCOUNTER — TELEPHONE (OUTPATIENT)
Dept: DERMATOLOGY | Facility: CLINIC | Age: 48
End: 2023-07-21

## 2023-07-21 ENCOUNTER — HOSPITAL ENCOUNTER (OUTPATIENT)
Dept: RADIOLOGY | Facility: HOSPITAL | Age: 48
Discharge: HOME | End: 2023-07-21
Payer: COMMERCIAL

## 2023-07-21 ENCOUNTER — OFFICE VISIT (OUTPATIENT)
Dept: INTERNAL MEDICINE CLINIC | Facility: CLINIC | Age: 48
End: 2023-07-21
Payer: COMMERCIAL

## 2023-07-21 ENCOUNTER — APPOINTMENT (OUTPATIENT)
Dept: LAB | Facility: CLINIC | Age: 48
End: 2023-07-21
Payer: COMMERCIAL

## 2023-07-21 VITALS
WEIGHT: 120 LBS | OXYGEN SATURATION: 98 % | BODY MASS INDEX: 22.08 KG/M2 | TEMPERATURE: 98 F | HEART RATE: 69 BPM | DIASTOLIC BLOOD PRESSURE: 68 MMHG | HEIGHT: 62 IN | SYSTOLIC BLOOD PRESSURE: 110 MMHG

## 2023-07-21 DIAGNOSIS — M25.50 GENERALIZED JOINT PAIN: ICD-10-CM

## 2023-07-21 DIAGNOSIS — M79.672 BILATERAL FOOT PAIN: ICD-10-CM

## 2023-07-21 DIAGNOSIS — M79.671 BILATERAL FOOT PAIN: ICD-10-CM

## 2023-07-21 DIAGNOSIS — M79.672 BILATERAL FOOT PAIN: Primary | ICD-10-CM

## 2023-07-21 DIAGNOSIS — M79.671 BILATERAL FOOT PAIN: Primary | ICD-10-CM

## 2023-07-21 LAB
ANA SER QL IA: NEGATIVE
CRP SERPL QL: <3 MG/L

## 2023-07-21 PROCEDURE — 99213 OFFICE O/P EST LOW 20 MIN: CPT | Performed by: NURSE PRACTITIONER

## 2023-07-21 PROCEDURE — 36415 COLL VENOUS BLD VENIPUNCTURE: CPT

## 2023-07-21 PROCEDURE — 73630 X-RAY EXAM OF FOOT: CPT

## 2023-07-21 PROCEDURE — 86140 C-REACTIVE PROTEIN: CPT

## 2023-07-21 PROCEDURE — 86430 RHEUMATOID FACTOR TEST QUAL: CPT

## 2023-07-21 PROCEDURE — 86038 ANTINUCLEAR ANTIBODIES: CPT

## 2023-07-21 NOTE — TELEPHONE ENCOUNTER
NP calling for apt, informed fully booked until end of year, but will put on waitlist for possible cancellation and to cb in August about scheduling in 2024.

## 2023-07-21 NOTE — PROGRESS NOTES
Name: Greg Castillo      : 1975      MRN: [id number]  Encounter Provider: CHOCO Alvarez  Encounter Date: 2023   Encounter department: [id number] [facility]     1. Bilateral foot pain  -     XR foot 3+ vw right; Future; Expected date: 2023  -     XR foot 3+ vw left; Future; Expected date: 2023  -     Ambulatory Referral to Podiatry; Future    2. Generalized joint pain  -     RF Screen w/ Reflex to Titer; Future  -     VERENICE Screen w/ Reflex to Titer/Pattern; Future  -     C-reactive protein; Future           Subjective      Johanne Long is here today to discuss joint pains. She has been having bilateral foot pain, hip and back pain. Ongoing bilateral foot pain for the last year. The pain is worse with weight bearing activities but also occurs at rest. The pain is around her whole foot. She works on her feet 10-11 hour days. She denies swelling other than during hot weather or eating salty foods. She also has intermittent hip and back pain. She takes advil as needed. She has been trying to swim more to help. She has been going to physical therapy  The physical therapist recommended seeing PCP to rule out inflammatory cause of pain     Review of Systems   Constitutional: Positive for activity change. Respiratory: Negative for shortness of breath. Cardiovascular: Negative for leg swelling. Musculoskeletal: Positive for arthralgias. Skin: Negative for color change and rash. Neurological: Negative for weakness and numbness.        Current Outpatient Medications on File Prior to Visit   Medication Sig   • acetaminophen (TYLENOL) 325 mg tablet Take 650 mg by mouth every 6 (six) hours as needed for mild pain   • albuterol (PROVENTIL HFA,VENTOLIN HFA) 90 mcg/act inhaler Inhale 2 puffs every 6 (six) hours as needed for wheezing   • Cholecalciferol (D3 PO) Take by mouth   • Coenzyme Q10 (CO Q-10 PO) Take by mouth   • Cyanocobalamin (B-12 PO) Take by mouth   • fluticasone (FLONASE) 50 mcg/act nasal spray 1 spray into each nostril daily   • ibuprofen (MOTRIN) 200 mg tablet Take 400 mg by mouth every 6 (six) hours as needed for mild pain   • MAGNESIUM CARBONATE PO Take by mouth   • SUMAtriptan (IMITREX) 50 mg tablet Take 1 tablet (50 mg total) by mouth as needed for migraine May repeat in 2 hours   • ALPRAZolam (XANAX) 0.25 mg tablet Use 1/2 pill daily as needed, use sparingly (Patient not taking: Reported on 3/14/2023)       Objective     /68   Pulse 69   Temp 98 °F (36.7 °C)   Ht 5' 2" (1.575 m)   Wt 54.4 kg (120 lb)   SpO2 98%   BMI 21.95 kg/m²     Physical Exam  Vitals reviewed. Constitutional:       Appearance: Normal appearance. HENT:      Head: Normocephalic and atraumatic. Eyes:      Conjunctiva/sclera: Conjunctivae normal.   Cardiovascular:      Pulses: Normal pulses. Musculoskeletal:         General: Normal range of motion. Right lower leg: No edema. Left lower leg: No edema. Neurological:      Mental Status: She is alert and oriented to person, place, and time.    Psychiatric:         Mood and Affect: Mood normal.         Behavior: Behavior normal.       CHOCO Osorio

## 2023-07-22 LAB — RHEUMATOID FACT SER QL LA: NEGATIVE

## 2023-07-28 ENCOUNTER — OFFICE VISIT (OUTPATIENT)
Dept: PHYSICAL THERAPY | Facility: OTHER | Age: 48
End: 2023-07-28

## 2023-07-28 DIAGNOSIS — M25.561 CHRONIC PAIN OF RIGHT KNEE: Primary | ICD-10-CM

## 2023-07-28 DIAGNOSIS — G89.29 CHRONIC PAIN OF RIGHT KNEE: Primary | ICD-10-CM

## 2023-07-28 DIAGNOSIS — M54.50 LUMBAR BACK PAIN: ICD-10-CM

## 2023-07-28 PROCEDURE — 97110 THERAPEUTIC EXERCISES: CPT | Performed by: PHYSICAL THERAPIST

## 2023-07-28 PROCEDURE — 97140 MANUAL THERAPY 1/> REGIONS: CPT | Performed by: PHYSICAL THERAPIST

## 2023-07-28 PROCEDURE — 97112 NEUROMUSCULAR REEDUCATION: CPT | Performed by: PHYSICAL THERAPIST

## 2023-07-28 NOTE — PROGRESS NOTES
Daily Note     Today's date: 2023  Patient name: Aby Holley  : 1975  MRN: [id number]  Referring provider: Clement Kerr, *  Dx:   Encounter Diagnosis     ICD-10-CM    1. Chronic pain of right knee  M25.561     G89.29       2. Lumbar back pain  M54.50                      Subjective: costum orthotic will be ordered. updated HEP issued. She has made significant progress since starting PT. She has a good plan moving forward with HEP. We will continue PT as needed. Objective: See treatment diary below      Assessment: Tolerated treatment well. Patient demonstrated fatigue post treatment      Plan: Continue per plan of care. Precautions:       Manuals 7..23 7.           Prone IR/ER SIJ mobilization B/L  GUICHO LINDO           PA mobs.   GUICHO LINDO            karon lying Mobiliztion L-S  GUICHO LINDO                         Neuro Re-Ed             Side step over bosu   20x2            Rocker board toss   20x2            MWM DF  5''x10  5''x10            sldier  5x  5x            [place] yoga  5''x10  5''x10            Sports cord   5 each            LAQ   purple 15x2            hamcurl   Purple 20x2            RDL   #5 10x2            [place] ham curl   10x2                         Bird dogs   25x           Ther Ex             Bike   5min            Press up  10c  10x            Slant boards tretch  30''x3  30''x3                                                                             Ther Activity                                       Gait Training                                       Modalities

## 2023-08-02 DIAGNOSIS — G43.709 CHRONIC MIGRAINE WITHOUT AURA WITHOUT STATUS MIGRAINOSUS, NOT INTRACTABLE: ICD-10-CM

## 2023-08-02 NOTE — TELEPHONE ENCOUNTER
Hi, my name is Cris David. My date of birth is [birth year] calling for a refills for my prescription for sumatriptan 50 milligrams as needed. And my phone number is [phone]. The pharmacy that I use that I would need to refill for is [facility] at [address] [place] [address]. It would just be a refill for my sumatriptan. Thank you    Rx entered.  Pls review and sign off    thanks

## 2023-08-03 RX ORDER — SUMATRIPTAN 50 MG/1
50 TABLET, FILM COATED ORAL AS NEEDED
Qty: 9 TABLET | Refills: 6 | Status: SHIPPED | OUTPATIENT
Start: 2023-08-03

## 2023-08-04 ENCOUNTER — OFFICE VISIT (OUTPATIENT)
Dept: PHYSICAL THERAPY | Facility: OTHER | Age: 48
End: 2023-08-04
Payer: COMMERCIAL

## 2023-08-04 DIAGNOSIS — M21.41 PES PLANUS OF BOTH FEET: ICD-10-CM

## 2023-08-04 DIAGNOSIS — M79.671 BILATERAL FOOT PAIN: Primary | ICD-10-CM

## 2023-08-04 DIAGNOSIS — M21.42 PES PLANUS OF BOTH FEET: ICD-10-CM

## 2023-08-04 DIAGNOSIS — M79.672 BILATERAL FOOT PAIN: Primary | ICD-10-CM

## 2023-08-04 PROCEDURE — 97760 ORTHOTIC MGMT&TRAING 1ST ENC: CPT | Performed by: PHYSICAL THERAPIST

## 2023-08-04 NOTE — PROGRESS NOTES
Orthotic Evaluation    Today's date: 23  Patient name: Ember Cheung  : 1975  MRN: [id number]  Referring provider: Fabricio Snyder, *  Dx:   Encounter Diagnosis     ICD-10-CM    1. Bilateral foot pain  M79.671 Ambulatory Referral to Podiatry    M79.672       2. Pes planus of both feet  M21.41     M21.42                       Subjective:    Ayla Hall presents today for orthotic evaluation. she complains of pain and decreased joint mobility with functional activities including ambulation and work. The patient plans to use her orthotic for walking, standing and work. The orthotic will be placed in a wide, size 8.5 sneaker. Objective:    Age: 49 y.o. Weight: 120    Foot Posture Index Score    Right Foot  Talar dome - +1  Malleolar curve - +1   Calcaneal eversion - +1  Talonavicular bulge - +2  Medial longitudinal arch - +2  Too many toes - +1  Total Score R = 8    Left Foot  Talar dome - +1  Malleolar curve - +1   Calcaneal eversion - +1  Talonavicular bulge - +1  Medial longitudinal arch - +1  Too many toes - +1  Total Score L = 6    Reference Values  Normal =    0 to +5  Pronated =  +6 to +9 Highly Pronated =  10+  Supinated =   -1 to -4 Highly Supinated = -5 to -12    Objective     Active Range of Motion   Left Ankle/Foot   Dorsiflexion (ke): 5 degrees   Great toe extension: 55 degrees     Right Ankle/Foot   Dorsiflexion (ke): 5 degrees   Great toe extension: 55 degrees     Joint Play   Left Ankle/Foot  Joints within functional limits are the subtalar joint, midfoot and forefoot. Hypomobile in the talocrural joint. Right Ankle/Foot  Joints within functional limits are the subtalar joint, midfoot and forefoot. Hypomobile in the talocrural joint. General Comments: Ankle/Foot Comments   Gait- unshod early excessive pronation      Assessment:    Patient requires custom foot orthosis with metbar to offload her painful met heads and medial posting to correct altered gait mechanics. Patient is not currently controlled by her shoe and OTC insert. Orthotic goals:  1) Patient will have custom foot orthoses fitted to her shoe. 2) Patient will be compliant with break-in period of custom foot orthoses as prescribed by PT. 3) Patient will be compliant with custom foot orthoses use as prescribed by PT.      Plan:    Planned therapy interventions: orthotic fitting/training  Duration in visits: 2

## 2023-08-18 ENCOUNTER — OFFICE VISIT (OUTPATIENT)
Dept: PHYSICAL THERAPY | Facility: OTHER | Age: 48
End: 2023-08-18
Payer: COMMERCIAL

## 2023-08-18 DIAGNOSIS — M79.671 BILATERAL FOOT PAIN: Primary | ICD-10-CM

## 2023-08-18 DIAGNOSIS — M79.672 BILATERAL FOOT PAIN: Primary | ICD-10-CM

## 2023-08-18 PROCEDURE — L3010 FOOT LONGITUDINAL ARCH SUPPO: HCPCS | Performed by: PHYSICAL THERAPIST

## 2023-08-18 NOTE — PROGRESS NOTES
Custom orthotics fitted to patients shoe and dispensed. Patient educated on appropriate break in period. Patient will follow up if any future problems arise.

## 2023-08-30 ENCOUNTER — TELEPHONE (OUTPATIENT)
Dept: INTERNAL MEDICINE CLINIC | Facility: CLINIC | Age: 48
End: 2023-08-30

## 2023-08-30 NOTE — TELEPHONE ENCOUNTER
You are too healthy to qualify for Paxlovid. Recommend to continue symptomatic treatment, use Flonase or saline nasal spray for the nasal congestion. Take Tylenol or ibuprofen as needed. If no fevers and you feel well enough, you can ask if you can return to work.

## 2023-08-30 NOTE — TELEPHONE ENCOUNTER
Pt tested + yesterday for COVID. Nasal congestion. Sore throat over the weekend, but not today. No fever, chest pain or shortness of breath. Symptoms started Friday night. Patient is interested in [address] [place] [address].

## 2023-09-06 NOTE — TELEPHONE ENCOUNTER
Do you have any phlegm? You can try taking Mucinex or Robitussin. Make sure you are drinking lots of fluids.

## 2023-09-06 NOTE — TELEPHONE ENCOUNTER
You can take regular Mucinex for up to 3 days. If you are able to measure your oxygen levels, to make sure it is ok.

## 2023-09-06 NOTE — TELEPHONE ENCOUNTER
Pt feels like she has mucus draining into her chest.  She wants to know what she can take to clear it up. Last night she took Advil and Benadryl.

## 2023-09-06 NOTE — TELEPHONE ENCOUNTER
Any chest pain or pressure? Wheezing? Do you feel out of breath? Yes, you can take ibuprofen or naproxen, as needed. If you have a lot of post nasal drip, have something at the back of your throat, you can use Flonase or a saline spray.

## 2023-09-06 NOTE — TELEPHONE ENCOUNTER
No wheezing only feels out of breath or discomfort with deep breath  Went and got mucinex, regular not DM how long should she take for?   She will try flonase and saline spray

## 2023-09-06 NOTE — TELEPHONE ENCOUNTER
Pt says she is not coughing any mucus up, but does feel it in the back of her throat when she swallows. She wants to know if she can take ibuprofen for tightness in her chest that feels like bronchitis.

## 2023-10-30 DIAGNOSIS — Z12.31 ENCOUNTER FOR SCREENING MAMMOGRAM FOR BREAST CANCER: Primary | ICD-10-CM

## 2023-10-31 ENCOUNTER — OFFICE VISIT (OUTPATIENT)
Dept: PODIATRY | Facility: CLINIC | Age: 48
End: 2023-10-31
Payer: COMMERCIAL

## 2023-10-31 VITALS
HEIGHT: 62 IN | WEIGHT: 120 LBS | SYSTOLIC BLOOD PRESSURE: 110 MMHG | DIASTOLIC BLOOD PRESSURE: 68 MMHG | BODY MASS INDEX: 22.08 KG/M2

## 2023-10-31 DIAGNOSIS — M21.42 PES PLANUS OF BOTH FEET: ICD-10-CM

## 2023-10-31 DIAGNOSIS — M25.871 PREDISLOCATION SYNDROME OF METATARSOPHALANGEAL JOINT OF RIGHT FOOT: Primary | ICD-10-CM

## 2023-10-31 DIAGNOSIS — M21.41 PES PLANUS OF BOTH FEET: ICD-10-CM

## 2023-10-31 PROCEDURE — 99203 OFFICE O/P NEW LOW 30 MIN: CPT | Performed by: PODIATRIST

## 2023-10-31 NOTE — PROGRESS NOTES
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 The calcification on the left Achilles is from an old rupture repair, no acute concern. NO clinical significance. Her 2nd MTPJ is angulated suggesting insufficient plantar plate. Clinically, she has unstable 2nd MTPJ, right worse than left. The achiness is from an insufficient plantar plate. The structure is not torn but the toe deformity is in the early stages. Care now can involve modifying how you exercise, orthotics (which she has, they fit well), can use Budin toe splint at work to reduce strain to the structure. Reappoint as neede.d         Subjective:      Patient ID: Ron  is a 51 y.o. female. Patient presents with chronic foot pain in both feet. She wears custom orthotics. Lately her feet have been very achy in the ball of her foot. Sometimes feel like a lump in her foot. It's not unbearable pain but achy and annoying. It just never goes away. She's had XRays. She got orthotics last summer, there was only subtle improvement. The following portions of the patient's history were reviewed and updated as appropriate: allergies, current medications, past family history, past medical history, past social history, past surgical history, and problem list.    Review of Systems    Constitutional: Negative. Respiratory: Negative for cough and shortness of breath. Gastrointestinal: Negative for diarrhea, nausea and vomiting. Musculoskeletal: chronic foot pain  Skin: Negative for rash or wound. Neurological: Negative for weakness, numbness and headaches. Objective:      /68   Ht 5' 2" (1.575 m)   Wt 54.4 kg (120 lb)   BMI 21.95 kg/m²          Physical Exam  Vitals reviewed. Cardiovascular:      Pulses: Normal pulses. Dorsalis pedis pulses are 2+ on the right side and 2+ on the left side. Posterior tibial pulses are 2+ on the right side and 2+ on the left side. Musculoskeletal:      Right foot: Deformity present. Left foot: Deformity (flexible pes planus, normal PT function and strength) present. Feet:    Feet:      Right foot:      Protective Sensation:   10 sites sensed. Left foot:      Protective Sensation:   10 sites sensed. Skin:     Capillary Refill: Capillary refill takes less than 2 seconds. Findings: No lesion. Neurological:      Mental Status: She is alert and oriented to person, place, and time.

## 2023-11-02 ENCOUNTER — ANNUAL EXAM (OUTPATIENT)
Dept: OBGYN CLINIC | Facility: CLINIC | Age: 48
End: 2023-11-02
Payer: COMMERCIAL

## 2023-11-02 VITALS
BODY MASS INDEX: 22.82 KG/M2 | DIASTOLIC BLOOD PRESSURE: 70 MMHG | SYSTOLIC BLOOD PRESSURE: 110 MMHG | WEIGHT: 124 LBS | HEIGHT: 62 IN

## 2023-11-02 DIAGNOSIS — Z78.0 POSTMENOPAUSAL: ICD-10-CM

## 2023-11-02 DIAGNOSIS — Z12.31 ENCOUNTER FOR SCREENING MAMMOGRAM FOR MALIGNANT NEOPLASM OF BREAST: ICD-10-CM

## 2023-11-02 DIAGNOSIS — Z01.419 ENCOUNTER FOR ANNUAL ROUTINE GYNECOLOGICAL EXAMINATION: Primary | ICD-10-CM

## 2023-11-02 PROCEDURE — G0476 HPV COMBO ASSAY CA SCREEN: HCPCS | Performed by: OBSTETRICS & GYNECOLOGY

## 2023-11-02 PROCEDURE — G0145 SCR C/V CYTO,THINLAYER,RESCR: HCPCS | Performed by: OBSTETRICS & GYNECOLOGY

## 2023-11-02 PROCEDURE — S0612 ANNUAL GYNECOLOGICAL EXAMINA: HCPCS | Performed by: OBSTETRICS & GYNECOLOGY

## 2023-11-02 NOTE — PROGRESS NOTES
Assessment/Plan:  Pap done as well as annual.  Encouraged self breast examination as well as calcium supplementation. Continue annual mammogram.  Reviewed colon cancer screening, plans on doing Cologuard. Reviewed signs and symptoms of menopause, amenorrheic for 12 consecutive months. Her hot flashes are tolerable. She will continue to follow-up with primary care as scheduled. Return to office in 1 year or as needed  No problem-specific Assessment & Plan notes found for this encounter. Diagnoses and all orders for this visit:    Encounter for annual routine gynecological examination  -     Liquid-based pap, screening    Encounter for screening mammogram for malignant neoplasm of breast  -     Mammo screening bilateral w 3d & cad; Future    Postmenopausal          Subjective:      Patient ID: Joycelyn Saeed is a 51 y.o. female. HPI this is a very pleasant 48-year-old female P3 ( x3, age 31, 24, 23) presents for GYN exam.    Her last menstrual cycle was 2022. There is been no further bleeding or spotting. She does get occasional hot flashes which are tolerable. There is been no changes in bowel or bladder function. She has been in a monogamous relationship for 8 years. Method of contraception is vasectomy. She has had a history of abnormal Pap smears, colposcopy . Pap smears have been normal since then. Colonoscopy never, she plans on doing her first Cologuard in the near future. She does follow-up with neurology for history of migraine headaches and uses Imitrex on a monthly basis. Pap 10/2022    Mammo 2023    Vasectomy    Colon cancer screen    The following portions of the patient's history were reviewed and updated as appropriate: allergies, current medications, past family history, past medical history, past social history, past surgical history, and problem list.    Review of Systems   Constitutional:  Negative for fatigue, fever and unexpected weight change. Respiratory:  Negative for cough, chest tightness, shortness of breath and wheezing. Cardiovascular: Negative. Negative for chest pain and palpitations. Gastrointestinal: Negative. Negative for abdominal distention, abdominal pain, blood in stool, constipation, diarrhea, nausea and vomiting. Genitourinary: Negative. Negative for difficulty urinating, dyspareunia, dysuria, flank pain, frequency, genital sores, hematuria, pelvic pain, urgency, vaginal bleeding, vaginal discharge and vaginal pain. Skin:  Negative for rash. Objective:      /70   Ht 5' 2" (1.575 m)   Wt 56.2 kg (124 lb)   LMP 11/11/2022 (Approximate)   BMI 22.68 kg/m²          Physical Exam  Constitutional:       Appearance: Normal appearance. She is well-developed. HENT:      Head: Normocephalic and atraumatic. Cardiovascular:      Rate and Rhythm: Normal rate and regular rhythm. Pulmonary:      Effort: Pulmonary effort is normal.      Breath sounds: Normal breath sounds. Chest:   Breasts:     Right: No inverted nipple, mass, nipple discharge, skin change or tenderness. Left: No inverted nipple, mass, nipple discharge, skin change or tenderness. Abdominal:      General: Bowel sounds are normal. There is no distension. Palpations: Abdomen is soft. Tenderness: There is no abdominal tenderness. There is no guarding or rebound. Genitourinary:     Labia:         Right: No rash, tenderness or lesion. Left: No rash, tenderness or lesion. Vagina: Normal. No signs of injury. No vaginal discharge or tenderness. Cervix: No cervical motion tenderness, discharge, friability, lesion or cervical bleeding. Uterus: Not enlarged and not tender. Adnexa:         Right: No mass, tenderness or fullness. Left: No mass, tenderness or fullness. Neurological:      Mental Status: She is alert.    Psychiatric:         Behavior: Behavior normal.

## 2023-11-09 LAB
LAB AP GYN PRIMARY INTERPRETATION: NORMAL
Lab: NORMAL

## 2023-12-07 ENCOUNTER — TELEPHONE (OUTPATIENT)
Dept: DERMATOLOGY | Facility: CLINIC | Age: 48
End: 2023-12-07

## 2023-12-07 NOTE — TELEPHONE ENCOUNTER
Rec'd call from patient. Aware of appointment change. Patient accepted new date/time. Aware of location of [facility] office.

## 2023-12-07 NOTE — TELEPHONE ENCOUNTER
Called patient to advise her upcoming appt with Dr. Rosetta Mann on 1/16 needs to be r/s, due to a change in provider's schedule. Left message to advise her appt was moved to 1/11 at 10:20. Advised pt to call the office if she needs to reschedule this appt.

## 2024-01-03 ENCOUNTER — OFFICE VISIT (OUTPATIENT)
Dept: URGENT CARE | Age: 49
End: 2024-01-03
Payer: COMMERCIAL

## 2024-01-03 VITALS
RESPIRATION RATE: 18 BRPM | SYSTOLIC BLOOD PRESSURE: 110 MMHG | TEMPERATURE: 97.7 F | HEART RATE: 70 BPM | OXYGEN SATURATION: 99 % | DIASTOLIC BLOOD PRESSURE: 74 MMHG

## 2024-01-03 DIAGNOSIS — N30.91 CYSTITIS WITH HEMATURIA: ICD-10-CM

## 2024-01-03 DIAGNOSIS — R30.0 BURNING WITH URINATION: Primary | ICD-10-CM

## 2024-01-03 LAB
SL AMB  POCT GLUCOSE, UA: NEGATIVE
SL AMB LEUKOCYTE ESTERASE,UA: ABNORMAL
SL AMB POCT BILIRUBIN,UA: NEGATIVE
SL AMB POCT BLOOD,UA: ABNORMAL
SL AMB POCT CLARITY,UA: ABNORMAL
SL AMB POCT COLOR,UA: YELLOW
SL AMB POCT KETONES,UA: NEGATIVE
SL AMB POCT NITRITE,UA: POSITIVE
SL AMB POCT PH,UA: 5
SL AMB POCT SPECIFIC GRAVITY,UA: 1.02
SL AMB POCT URINE PROTEIN: 30
SL AMB POCT UROBILINOGEN: 0.2

## 2024-01-03 PROCEDURE — 87086 URINE CULTURE/COLONY COUNT: CPT

## 2024-01-03 PROCEDURE — 99213 OFFICE O/P EST LOW 20 MIN: CPT

## 2024-01-03 PROCEDURE — 87186 SC STD MICRODIL/AGAR DIL: CPT

## 2024-01-03 PROCEDURE — 81002 URINALYSIS NONAUTO W/O SCOPE: CPT

## 2024-01-03 PROCEDURE — 87077 CULTURE AEROBIC IDENTIFY: CPT

## 2024-01-03 RX ORDER — PHENAZOPYRIDINE HYDROCHLORIDE 200 MG/1
200 TABLET, FILM COATED ORAL
Qty: 10 TABLET | Refills: 0 | Status: SHIPPED | OUTPATIENT
Start: 2024-01-03

## 2024-01-03 RX ORDER — NITROFURANTOIN 25; 75 MG/1; MG/1
100 CAPSULE ORAL 2 TIMES DAILY
Qty: 10 CAPSULE | Refills: 0 | Status: SHIPPED | OUTPATIENT
Start: 2024-01-03 | End: 2024-01-03

## 2024-01-03 RX ORDER — ONDANSETRON 4 MG/1
4 TABLET, FILM COATED ORAL EVERY 8 HOURS PRN
Qty: 20 TABLET | Refills: 0 | Status: SHIPPED | OUTPATIENT
Start: 2024-01-03

## 2024-01-03 RX ORDER — NITROFURANTOIN 25; 75 MG/1; MG/1
100 CAPSULE ORAL 2 TIMES DAILY
Qty: 10 CAPSULE | Refills: 0 | Status: SHIPPED | OUTPATIENT
Start: 2024-01-03 | End: 2024-01-08

## 2024-01-03 RX ORDER — PHENAZOPYRIDINE HYDROCHLORIDE 200 MG/1
200 TABLET, FILM COATED ORAL
Qty: 10 TABLET | Refills: 0 | Status: SHIPPED | OUTPATIENT
Start: 2024-01-03 | End: 2024-01-03

## 2024-01-04 NOTE — PROGRESS NOTES
[facility]        NAME: Peace Noriega is a 48 y.o. female  : 1975    MRN: [id number]  DATE: January 3, 2024  TIME: 7:00 PM    Assessment and Plan   Burning with urination [R30.0]  1. Burning with urination  POCT urine dip    Urine culture    Urine culture      2. Cystitis with hematuria  nitrofurantoin (MACROBID) 100 mg capsule    phenazopyridine (PYRIDIUM) 200 mg tablet        Uti symptoms- no nausea, fever- dip notes cystitis with hematuria.     Patient Instructions       Follow up with PCP as needed    Chief Complaint     Chief Complaint   Patient presents with    Possible UTI     Pt c/o burning with urination, lower abd pressure. SX for two days. OTC cystex.          History of Present Illness       Uti symptoms- no nausea, fever- dip notes cystitis with hematuria.         Review of Systems   Review of Systems   Constitutional:  Negative for chills and fever.   HENT:  Negative for congestion, postnasal drip and sore throat.    Respiratory:  Negative for cough, shortness of breath and wheezing.    Cardiovascular:  Negative for chest pain.   Gastrointestinal:  Positive for abdominal pain (suprapubic pain). Negative for abdominal distention, nausea and vomiting.   Genitourinary:  Positive for dysuria and frequency. Negative for flank pain and hematuria.   Musculoskeletal:  Negative for back pain.   Neurological:  Negative for dizziness, syncope, light-headedness and headaches.   Psychiatric/Behavioral:  Negative for agitation and confusion.    All other systems reviewed and are negative.        Current Medications       Current Outpatient Medications:     albuterol (PROVENTIL HFA,VENTOLIN HFA) 90 mcg/act inhaler, Inhale 2 puffs every 6 (six) hours as needed for wheezing, Disp: 8 g, Rfl: 1    nitrofurantoin (MACROBID) 100 mg capsule, Take 1 capsule (100 mg total) by mouth 2 (two) times a day for 5 days, Disp: 10 capsule, Rfl: 0    phenazopyridine (PYRIDIUM) 200 mg tablet, Take 1 tablet (200  mg total) by mouth 3 (three) times a day with meals, Disp: 10 tablet, Rfl: 0    SUMAtriptan (IMITREX) 50 mg tablet, Take 1 tablet (50 mg total) by mouth as needed for migraine May repeat in 2 hours, Disp: 9 tablet, Rfl: 6    acetaminophen (TYLENOL) 325 mg tablet, Take 650 mg by mouth every 6 (six) hours as needed for mild pain, Disp: , Rfl:     ALPRAZolam (XANAX) 0.25 mg tablet, Use 1/2 pill daily as needed, use sparingly (Patient not taking: Reported on 3/14/2023), Disp: 20 tablet, Rfl: 0    Cholecalciferol (D3 PO), Take by mouth (Patient not taking: Reported on 11/2/2023), Disp: , Rfl:     Coenzyme Q10 (CO Q-10 PO), Take by mouth (Patient not taking: Reported on 11/2/2023), Disp: , Rfl:     Cyanocobalamin (B-12 PO), Take by mouth (Patient not taking: Reported on 11/2/2023), Disp: , Rfl:     fluticasone (FLONASE) 50 mcg/act nasal spray, 1 spray into each nostril daily (Patient not taking: Reported on 1/3/2024), Disp: 48 g, Rfl: 1    ibuprofen (MOTRIN) 200 mg tablet, Take 400 mg by mouth every 6 (six) hours as needed for mild pain (Patient not taking: Reported on 1/3/2024), Disp: , Rfl:     MAGNESIUM CARBONATE PO, Take by mouth (Patient not taking: Reported on 11/2/2023), Disp: , Rfl:     Current Allergies     Allergies as of 01/03/2024    (No Known Allergies)            The following portions of the patient's history were reviewed and updated as appropriate: allergies, current medications, past family history, past medical history, past social history, past surgical history and problem list.     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     Bronchitis     HPV in female     Hypercholesterolemia     200/75/119    Menstrual migraine     Migraines        Past Surgical History:   Procedure Laterality Date    ACHILLES TENDON REPAIR Left     Repair of Ruptured achilles tendon left    ACHILLES TENDON REPAIR Left     [age]    ELBOW  ARTHROPLASTY Left     ELBOW SURGERY      [age]    WISDOM TOOTH EXTRACTION         Family History   Problem Relation Age of Onset    Hyperlipidemia Mother         Pure hypocholesterolemia    Aortic aneurysm Father     Hypertension Father     Osteoarthritis Father     No Known Problems Sister     No Known Problems Daughter     Cancer Maternal Grandfather         unknown type or dx age    Hypertension Paternal Grandmother     Parkinsonism Paternal Grandfather     Hypertension Paternal Grandfather     No Known Problems Maternal Aunt     Leukemia Cousin 20    Breast cancer Neg Hx     Ovarian cancer Neg Hx          Medications have been verified.        Objective   /74   Pulse 70   Temp 97.7 °F (36.5 °C) (Tympanic)   Resp 18   SpO2 99%   No LMP recorded.       Physical Exam     Physical Exam  Vitals reviewed.   Constitutional:       General: She is not in acute distress.     Appearance: Normal appearance. She is not ill-appearing.   HENT:      Head: Normocephalic and atraumatic.   Eyes:      Extraocular Movements: Extraocular movements intact.      Conjunctiva/sclera: Conjunctivae normal.   Cardiovascular:      Rate and Rhythm: Normal rate and regular rhythm.      Pulses: Normal pulses.      Heart sounds: Normal heart sounds. No murmur heard.  Pulmonary:      Effort: Pulmonary effort is normal. No respiratory distress.      Breath sounds: Normal breath sounds.   Abdominal:      General: Bowel sounds are normal.      Tenderness: There is abdominal tenderness (suprapubic). There is no right CVA tenderness or left CVA tenderness.   Musculoskeletal:      Cervical back: Normal range of motion.   Skin:     General: Skin is warm.   Neurological:      General: No focal deficit present.      Mental Status: She is alert.   Psychiatric:         Mood and Affect: Mood normal.         Behavior: Behavior normal.         Judgment: Judgment normal.

## 2024-01-05 LAB — BACTERIA UR CULT: ABNORMAL

## 2024-01-08 ENCOUNTER — TELEPHONE (OUTPATIENT)
Age: 49
End: 2024-01-08

## 2024-01-08 DIAGNOSIS — R39.9 URINARY SYMPTOM OR SIGN: Primary | ICD-10-CM

## 2024-01-08 DIAGNOSIS — R10.2 PELVIC PAIN: ICD-10-CM

## 2024-01-08 RX ORDER — CEPHALEXIN 500 MG/1
500 CAPSULE ORAL EVERY 12 HOURS SCHEDULED
Qty: 10 CAPSULE | Refills: 0 | Status: SHIPPED | OUTPATIENT
Start: 2024-01-08 | End: 2024-01-13

## 2024-01-08 NOTE — TELEPHONE ENCOUNTER
I can order another UA to see if still with infection since still having symptoms.  Any fevers? Back pain?

## 2024-01-08 NOTE — TELEPHONE ENCOUNTER
I can change antibiotic to Keflex or you can give another urine sample.    Sent to the pharmacy, UA ordered.      You can take Tylenol or ibuprofen prn for fevers.

## 2024-01-08 NOTE — TELEPHONE ENCOUNTER
Pt called to say she went to [facility] on 1/3/24 for a UTI. She has one more pill of the nitrofurantoin to take and she has finished the Pyridium but still has pressure and painful urination. She still has some nausea as well. Does the pt need an appt and/or another urine test? Please advise.

## 2024-01-10 ENCOUNTER — APPOINTMENT (OUTPATIENT)
Dept: LAB | Facility: CLINIC | Age: 49
End: 2024-01-10
Payer: COMMERCIAL

## 2024-01-10 ENCOUNTER — TELEPHONE (OUTPATIENT)
Age: 49
End: 2024-01-10

## 2024-01-10 LAB
BACTERIA UR QL AUTO: NORMAL /HPF
BILIRUB UR QL STRIP: NEGATIVE
CLARITY UR: CLEAR
COLOR UR: COLORLESS
GLUCOSE UR STRIP-MCNC: NEGATIVE MG/DL
HGB UR QL STRIP.AUTO: ABNORMAL
KETONES UR STRIP-MCNC: NEGATIVE MG/DL
LEUKOCYTE ESTERASE UR QL STRIP: ABNORMAL
NITRITE UR QL STRIP: NEGATIVE
NON-SQ EPI CELLS URNS QL MICRO: NORMAL /HPF
PH UR STRIP.AUTO: 7 [PH]
PROT UR STRIP-MCNC: NEGATIVE MG/DL
RBC #/AREA URNS AUTO: NORMAL /HPF
SP GR UR STRIP.AUTO: 1.01 (ref 1–1.03)
UROBILINOGEN UR STRIP-ACNC: <2 MG/DL
WBC #/AREA URNS AUTO: NORMAL /HPF

## 2024-01-10 PROCEDURE — 81001 URINALYSIS AUTO W/SCOPE: CPT

## 2024-01-10 NOTE — TELEPHONE ENCOUNTER
Please call patient.  I am not sure if recent vomiting due to antibiotic since you had it already prior to taking it.  Recommend to give another urine sample, I had ordered UA already.  Are you taking Tylenol or ibuprofen for the fever?  Any back or groin pain?

## 2024-01-10 NOTE — TELEPHONE ENCOUNTER
Urine test is normal, no more infection. No need to continue antibiotics.  Since having a lot of pelvic symptoms, ultrasound ordered. Please call to schedule.  Any vaginal discharge or abnormal bleeding?

## 2024-01-10 NOTE — TELEPHONE ENCOUNTER
Spoke to patient and advised   Will give sample   She is taking tylenol   States no back or groin pain, more so just pressure in the pelvic area

## 2024-01-10 NOTE — TELEPHONE ENCOUNTER
Patient called in to report nausea with vomiting on Monday 1/8 after speaking with office. Left work early; picked up 2nd antibiotic; took one and by the time she go home had multiple episodes of vomiting. Vomiting resolved by Monday evening; nausea overnight; feeling better through Tuesday.    Reports she still has pressure in pelvic area. Denies back pain Low grade fever .8 F for the past 3 days. Patient wants to know if she should be feeling better by now since this is her second round of antibiotic therapy? Also, should patient complete UA ordered on Monday 1/8? Please follow up with patient.

## 2024-01-11 ENCOUNTER — CONSULT (OUTPATIENT)
Dept: DERMATOLOGY | Facility: CLINIC | Age: 49
End: 2024-01-11
Payer: COMMERCIAL

## 2024-01-11 VITALS — HEIGHT: 62 IN | BODY MASS INDEX: 22.26 KG/M2 | TEMPERATURE: 97.4 F | WEIGHT: 121 LBS

## 2024-01-11 DIAGNOSIS — D22.62 MULTIPLE BENIGN MELANOCYTIC NEVI OF UPPER AND LOWER EXTREMITIES AND TRUNK: ICD-10-CM

## 2024-01-11 DIAGNOSIS — D22.5 MULTIPLE BENIGN MELANOCYTIC NEVI OF UPPER AND LOWER EXTREMITIES AND TRUNK: ICD-10-CM

## 2024-01-11 DIAGNOSIS — L82.1 SEBORRHEIC KERATOSIS: Primary | ICD-10-CM

## 2024-01-11 DIAGNOSIS — L81.4 LENTIGO: ICD-10-CM

## 2024-01-11 DIAGNOSIS — D18.01 CHERRY ANGIOMA: ICD-10-CM

## 2024-01-11 DIAGNOSIS — D22.71 MULTIPLE BENIGN MELANOCYTIC NEVI OF UPPER AND LOWER EXTREMITIES AND TRUNK: ICD-10-CM

## 2024-01-11 DIAGNOSIS — D22.72 MULTIPLE BENIGN MELANOCYTIC NEVI OF UPPER AND LOWER EXTREMITIES AND TRUNK: ICD-10-CM

## 2024-01-11 DIAGNOSIS — D48.5 NEOPLASM OF UNCERTAIN BEHAVIOR OF SKIN: ICD-10-CM

## 2024-01-11 DIAGNOSIS — D22.61 MULTIPLE BENIGN MELANOCYTIC NEVI OF UPPER AND LOWER EXTREMITIES AND TRUNK: ICD-10-CM

## 2024-01-11 PROCEDURE — 88342 IMHCHEM/IMCYTCHM 1ST ANTB: CPT | Performed by: DERMATOLOGY

## 2024-01-11 PROCEDURE — 88305 TISSUE EXAM BY PATHOLOGIST: CPT | Performed by: DERMATOLOGY

## 2024-01-11 PROCEDURE — 88341 IMHCHEM/IMCYTCHM EA ADD ANTB: CPT | Performed by: DERMATOLOGY

## 2024-01-11 PROCEDURE — 11102 TANGNTL BX SKIN SINGLE LES: CPT | Performed by: DERMATOLOGY

## 2024-01-11 PROCEDURE — 99204 OFFICE O/P NEW MOD 45 MIN: CPT | Performed by: DERMATOLOGY

## 2024-01-11 NOTE — PROGRESS NOTES
"[facility] Note     Patient Name: Peace Noriega  Encounter Date: 1/11/24     Have you been cared for by a [facility] Dermatologist in the last 3 years and, if so, which description applies to you?    Yes.  I have been here within the last 3 years, and my medical history has NOT changed since that time.  I am FEMALE/of child-bearing potential.    REVIEW OF SYSTEMS:  Have you recently had or currently have any of the following? No changes in my recent health.   PAST MEDICAL HISTORY:  Have you personally ever had or currently have any of the following?  If \"YES,\" then please provide more detail. No changes in my medical history.   HISTORY OF IMMUNOSUPPRESSION: Do you have a history of any of the following:  Systemic Immunosuppression such as Diabetes, Biologic or Immunotherapy, Chemotherapy, Organ Transplantation, Bone Marrow Transplantation?  No     Answering \"YES\" requires the addition of the dotphrase \"IMMUNOSUPPRESSED\" as the first diagnosis of the patient's visit.   FAMILY HISTORY:  Any \"first degree relatives\" (parent, brother, sister, or child) with the following?    No changes in my family's known health.   PATIENT EXPERIENCE:    Do you want the Dermatologist to perform a COMPLETE skin exam today including a clinical examination under the \"bra and underwear\" areas?  Yes  If necessary, do we have your permission to call and leave a detailed message on your Preferred Phone number that includes your specific medical information?  Yes      No Known Allergies   Current Outpatient Medications:     acetaminophen (TYLENOL) 325 mg tablet, Take 650 mg by mouth every 6 (six) hours as needed for mild pain, Disp: , Rfl:     albuterol (PROVENTIL HFA,VENTOLIN HFA) 90 mcg/act inhaler, Inhale 2 puffs every 6 (six) hours as needed for wheezing, Disp: 8 g, Rfl: 1    SUMAtriptan (IMITREX) 50 mg tablet, Take 1 tablet (50 mg total) by mouth as needed for migraine May repeat in 2 hours, Disp: 9 tablet, Rfl: " 6    ALPRAZolam (XANAX) 0.25 mg tablet, Use 1/2 pill daily as needed, use sparingly (Patient not taking: Reported on 3/14/2023), Disp: 20 tablet, Rfl: 0    cephalexin (KEFLEX) 500 mg capsule, Take 1 capsule (500 mg total) by mouth every 12 (twelve) hours for 5 days (Patient not taking: Reported on 1/11/2024), Disp: 10 capsule, Rfl: 0    Cholecalciferol (D3 PO), Take by mouth (Patient not taking: Reported on 11/2/2023), Disp: , Rfl:     Coenzyme Q10 (CO Q-10 PO), Take by mouth (Patient not taking: Reported on 11/2/2023), Disp: , Rfl:     Cyanocobalamin (B-12 PO), Take by mouth (Patient not taking: Reported on 11/2/2023), Disp: , Rfl:     fluticasone (FLONASE) 50 mcg/act nasal spray, 1 spray into each nostril daily (Patient not taking: Reported on 1/3/2024), Disp: 48 g, Rfl: 1    ibuprofen (MOTRIN) 200 mg tablet, Take 400 mg by mouth every 6 (six) hours as needed for mild pain (Patient not taking: Reported on 1/3/2024), Disp: , Rfl:     MAGNESIUM CARBONATE PO, Take by mouth (Patient not taking: Reported on 11/2/2023), Disp: , Rfl:     ondansetron (ZOFRAN) 4 mg tablet, Take 1 tablet (4 mg total) by mouth every 8 (eight) hours as needed for nausea or vomiting (Patient not taking: Reported on 1/11/2024), Disp: 20 tablet, Rfl: 0    phenazopyridine (PYRIDIUM) 200 mg tablet, Take 1 tablet (200 mg total) by mouth 3 (three) times a day with meals (Patient not taking: Reported on 1/11/2024), Disp: 10 tablet, Rfl: 0          Whom besides the patient is providing clinical information about today's encounter?   NO ADDITIONAL HISTORIAN (patient alone provided history)    Physical Exam and Assessment/Plan by Diagnosis:    SEBORRHEIC KERATOSES  - Relevant exam: Scattered over the trunk/extremities, face (sites of concern on L temple and forehead) are waxy brown papules with stuck on appearance and consistent dermoscopy  - Exam and clinical history consistent with seborrheic keratoses  - Counseled that these are benign growths that  do not require treatment  - Counseled that removal of lesions is considered cosmetic and so would incur a fee should patient elect to move forward.     MELANOCYTIC NEVI    Family hx with father with MM  -Relevant exam: Scattered over the trunk/extremities are homogenously pigmented brown macules and papules. ELM performed and without concerning findings. No outliers unless otherwise noted in today's note  - Exam and clinical history consistent with melanocytic nevi  - Educated on the ABCDE's of melanoma; handout provided  - Counseled to return to clinic prior to scheduled appointment should any of these lesions change or should any new lesions of concern arise  - Counseled on use of sun protection daily. Reviewed latest FDA sunscreen guidelines, including use of broad spectrum (UVA and UVB blocking) sunscreen or sun protective clothing with SPF 30-50 every 2-3 hours and reapplied after exposure to water; use of photoprotective clothing, including a broad brim hat and UPF rated clothing if outdoors for several hours; avoid use of tanning beds as these pose significant risk for melanoma and skin cancer.    LENTIGINES  OTHER SKIN CHANGES DUE TO CHRONIC EXPOSURE TO NONIONIZING RADIATION  - Relevant exam: Over sun exposed areas are brown macules. ELM performed and without concerning findings.  - Exam and clinical history consistent with lentigines.  - Educated that these are indicative of prior sun exposure.   - Counseled to return to clinic prior to scheduled appointment should any of these lesions change or should any new lesions of concern arise.  - Recommended use of sunscreen as above and below.  - Counseled on use of sun protection daily. Reviewed latest FDA sunscreen guidelines, including use of broad spectrum (UVA and UVB blocking) sunscreen or sun protective clothing with SPF 30-50 every 2-3 hours and reapplied after exposure to water; use of photoprotective clothing, including a broad brim hat and UPF rated  "clothing if outdoors for several hours; avoid use of tanning beds as these pose significant risk for melanoma and skin cancer.    CHERRY ANGIOMAS  - Relevant exam: Scattered over the trunk/extremities, chin are red papules  - Exam and clinical history consistent with cherry angiomas  - Educated that these are benign  - Educated that removal is considered aesthetic and would incur a fee.    NEOPLASM OF UNCERTAIN BEHAVIOR OF SKIN    Physical Exam:  (Anatomic Location); (Size and Morphological Description); (Differential Diagnosis):  Right mid back; 3 mm irregularly pigmented papule. Differential diagnosis: benign vs atypical nevus; rule out melanoma. Possible DF    Pertinent Positives:  Pertinent Negatives:    Additional History of Present Condition:  patient notes that has been itchy recently.    Assessment and Plan:  I have discussed with the patient that a sample of skin via a \"skin biopsy” would be potentially helpful to further make a specific diagnosis under the microscope.  Based on a thorough discussion of this condition and the management approach to it (including a comprehensive discussion of the known risks, side effects and potential benefits of treatment), the patient (family) agrees to implement the following specific plan:    Procedure:  Skin Biopsy.  After a thorough discussion of treatment options and risk/benefits/alternatives (including but not limited to local pain, scarring, dyspigmentation, blistering, possible superinfection, and inability to confirm a diagnosis via histopathology), verbal and written consent were obtained and portion of the rash was biopsied for tissue sample.  See below for consent that was obtained from patient and subsequent Procedure Note.    PROCEDURE TANGENTIAL (SHAVE) BIOPSY NOTE:    Performing Physician:   Anatomic Location; Clinical Description with size (cm); Pre-Op Diagnosis:   ATTENTION:  DERMPATH GROUP    SPECIMEN A; Skin; Anatomic Location: right mid " "back; Procedure/Protocol: Skin Specimen (submit in [facility]):Tangential Biopsy (includes shave, scoop, saucerization, curette) (CPT 52634; each additional tangential biopsy is CPT 50469)   48 y.o. year old  Female with a Morphological Description:3 mm irregularly pigmented papule.  Differential Diagnosis and/or Specific Clinical Question:benign vs atypical nevus; rule out melanoma. Possible DF    Post-op diagnosis: Same     Local anesthesia: 1% xylocaine with epi      Topical anesthesia: None    Hemostasis: Aluminum chloride       After obtaining informed consent  at which time there was a discussion about the purpose of biopsy  and low risks of infection and bleeding.  The area was prepped and draped in the usual fashion. Anesthesia was obtained with 1% lidocaine with epinephrine. A shave biopsy to an appropriate sampling depth was obtained by Shave (Dermablade or 15 blade) The resulting wound was covered with surgical ointment and bandaged appropriately.     The patient tolerated the procedure well without complications and was without signs of functional compromise.      Specimen has been sent for review by Dermatopathology.    Standard post-procedure care has been explained and has been included in written form within the patient's copy of Informed Consent.    INFORMED CONSENT DISCUSSION AND POST-OPERATIVE INSTRUCTIONS FOR PATIENT    I.  RATIONALE FOR PROCEDURE  I understand that a skin biopsy allows the Dermatologist to test a lesion or rash under the microscope to obtain a diagnosis.  It usually involves numbing the area with numbing medication and removing a small piece of skin; sometimes the area will be closed with sutures. In this specific procedure, sutures are not usually needed.  If any sutures are placed, then they are usually need to be removed in 2 weeks or less.    I understand that my Dermatologist recommends that a skin \"shave\" biopsy be performed today.  A local anesthetic, similar to the kind " "that a dentist uses when filling a cavity, will be injected with a very small needle into the skin area to be sampled.  The injected skin and tissue underneath \"will go to sleep” and become numb so no pain should be felt afterwards.  An instrument shaped like a tiny \"razor blade\" (shave biopsy instrument) will be used to cut a small piece of tissue and skin from the area so that a sample of tissue can be taken and examined more closely under the microscope.  A slight amount of bleeding will occur, but it will be stopped with direct pressure and a pressure bandage and any other appropriate methods.  I understands that a scar will form where the wound was created.  Surgical ointment will be applied to help protect the wound.  Sutures are not usually needed.    II.  RISKS AND POTENTIAL COMPLICATIONS   I understand the risks and potential complications of a skin biopsy include but are not limited to the following:  Bleeding  Infection  Pain  Scar/keloid  Skin discoloration  Incomplete Removal  Recurrence  Nerve Damage/Numbness/Loss of Function  Allergic Reaction to Anesthesia  Biopsies are diagnostic procedures and based on findings additional treatment or evaluation may be required  Loss or destruction of specimen resulting in no additional findings    My Dermatologist has explained to me the nature of the condition, the nature of the procedure, and the benefits to be reasonably expected compared with alternative approaches.  My Dermatologist has discussed the likelihood of major risks or complications of this procedure including the specific risks listed above, such as bleeding, infection, and scarring/keloid.  I understand that a scar is expected after this procedure.  I understand that my physician cannot predict if the scar will form a \"keloid,\" which extends beyond the borders of the wound that is created.  A keloid is a thick, painful, and bumpy scar.  A keloid can be difficult to treat, as it does not always " "respond well to therapy, which includes injecting cortisone directly into the keloid every few weeks.  While this usually reduces the pain and size of the scar, it does not eliminate it.      I understand that photographs may be taken before and after the procedure.  These will be maintained as part of the medical providers confidential records and may not be made available to me.  I further authorize the medical provider to use the photographs for teaching purposes or to illustrate scientific papers, books, or lectures if in his/her judgment, medical research, education, or science may benefit from its use.    I have had an opportunity to fully inquire about the risks and benefits of this procedure and its alternatives.   I have been given ample time and opportunity to ask questions and to seek a second opinion if I wished to do so.  I acknowledge that there have specifically been no guarantees as to the cosmetic results from the procedure.  I am aware that with any procedure there is always the possibility of an unexpected complication.    III. POST-PROCEDURAL CARE (WHAT YOU WILL NEED TO DO \"AFTER THE BIOPSY\" TO OPTIMIZE HEALING)    Keep the area clean and dry.  Try NOT to remove the bandage or get it wet for the first 24 hours.    Gently clean the area and apply surgical ointment (such as Vaseline petrolatum ointment, which is available \"over the counter\" and not a prescription) to the biopsy site for up to 2 weeks straight.  This acts to protect the wound from the outside world.      Sutures are not usually placed in this procedure.  If any sutures were placed, return for suture removal as instructed (generally 1 week for the face, 2 weeks for the body).      Take Acetaminophen (Tylenol) for discomfort, if no contraindications.  Ibuprofen or aspirin could make bleeding worse.    Call our office immediately for signs of infection: fever, chills, increased redness, warmth, tenderness, discomfort/pain, or pus or " foul smell coming from the wound.    WHAT TO DO IF THERE IS ANY BLEEDING?  If a small amount of bleeding is noticed, place a clean cloth over the area and apply firm pressure for ten minutes.  Check the wound after 10 minutes of direct pressure.  If bleeding persists, try one more time for an additional 10 minutes of direct pressure on the area.  If the bleeding becomes heavier or does not stop after the second attempt, or if you have any other questions about this procedure, then please call your [facility]'s Dermatologist by calling [phone] (SKIN).     I hereby acknowledge that I have reviewed and verified the site with my Dermatologist and have requested and authorized my Dermatologist to proceed with the procedure.      DERMATOFIBROMA    Physical Exam:  Anatomic Location Affected:  left dorsal foot  Morphological Description: 3 light brown firm papule with consistent dermoscopy  Pertinent Positives:  Pertinent Negatives: No itch, pain, no growth or change    Additional History of Present Condition: N/A    Assessment and Plan:  - History and physical consistent with dermatofibroma  - Educated that these lesions are generally benign   - Offered shave biopsy to confirm diagnosis but patient declines. For any growth or change would recommend removal    Of note, patient has a history of onychomycosis treated multiple times with oral terbinafine. She is unsure if it is coming back again on her first toenails and inquiring about options. On exam, had some mild onycholysis and dystrophy of these nails. Discussed need for clipping to consider repeat oral medications as exam is not classic for onychomycosis vs topical antifungal. Suggested using vinegar soaks at home and she can consider further RX therapy if desired.      Scribe Attestation      I,:  Jeri Dover am acting as a scribe while in the presence of the attending physician.:       I,:  Ester Fernandez MD personally performed the services described in this  documentation    as scribed in my presence.:

## 2024-01-11 NOTE — PATIENT INSTRUCTIONS
SEBORRHEIC KERATOSES  - Relevant exam: Scattered over the trunk/extremities are waxy brown to black plaques and papules with stuck on appearance and consistent dermoscopy  - Exam and clinical history consistent with seborrheic keratoses  - Counseled that these are benign growths that do not require treatment  - Counseled that removal of lesions is considered cosmetic and so would incur a fee should patient elect to move forward.     MELANOCYTIC NEVI    Family hx with father with MM  -Relevant exam: Scattered over the trunk/extremities are homogenously pigmented brown macules and papules. ELM performed and without concerning findings. No outliers unless otherwise noted in today's note  - Exam and clinical history consistent with melanocytic nevi  - Educated on the ABCDE's of melanoma; handout provided  - Counseled to return to clinic prior to scheduled appointment should any of these lesions change or should any new lesions of concern arise  - Counseled on use of sun protection daily. Reviewed latest FDA sunscreen guidelines, including use of broad spectrum (UVA and UVB blocking) sunscreen or sun protective clothing with SPF 30-50 every 2-3 hours and reapplied after exposure to water; use of photoprotective clothing, including a broad brim hat and UPF rated clothing if outdoors for several hours; avoid use of tanning beds as these pose significant risk for melanoma and skin cancer.    LENTIGINES  OTHER SKIN CHANGES DUE TO CHRONIC EXPOSURE TO NONIONIZING RADIATION  - Relevant exam: Over sun exposed areas are brown macules. ELM performed and without concerning findings.  - Exam and clinical history consistent with lentigines.  - Educated that these are indicative of prior sun exposure.   - Counseled to return to clinic prior to scheduled appointment should any of these lesions change or should any new lesions of concern arise.  - Recommended use of sunscreen as above and below.  - Counseled on use of sun protection  "daily. Reviewed latest FDA sunscreen guidelines, including use of broad spectrum (UVA and UVB blocking) sunscreen or sun protective clothing with SPF 30-50 every 2-3 hours and reapplied after exposure to water; use of photoprotective clothing, including a broad brim hat and UPF rated clothing if outdoors for several hours; avoid use of tanning beds as these pose significant risk for melanoma and skin cancer.    CHERRY ANGIOMAS  - Relevant exam: Scattered over the trunk/extremities are red papules  - Exam and clinical history consistent with cherry angiomas  - Educated that these are benign  - Educated that removal is considered aesthetic and would incur a fee.    NEOPLASM OF UNCERTAIN BEHAVIOR OF SKIN    Assessment and Plan:  I have discussed with the patient that a sample of skin via a \"skin biopsy” would be potentially helpful to further make a specific diagnosis under the microscope.  Based on a thorough discussion of this condition and the management approach to it (including a comprehensive discussion of the known risks, side effects and potential benefits of treatment), the patient (family) agrees to implement the following specific plan:    Procedure:  Skin Biopsy.  After a thorough discussion of treatment options and risk/benefits/alternatives (including but not limited to local pain, scarring, dyspigmentation, blistering, possible superinfection, and inability to confirm a diagnosis via histopathology), verbal and written consent were obtained and portion of the rash was biopsied for tissue sample.  See below for consent that was obtained from patient and subsequent Procedure Note.    INFORMED CONSENT DISCUSSION AND POST-OPERATIVE INSTRUCTIONS FOR PATIENT    I.  RATIONALE FOR PROCEDURE  I understand that a skin biopsy allows the Dermatologist to test a lesion or rash under the microscope to obtain a diagnosis.  It usually involves numbing the area with numbing medication and removing a small piece of skin; " "sometimes the area will be closed with sutures. In this specific procedure, sutures are not usually needed.  If any sutures are placed, then they are usually need to be removed in 2 weeks or less.    I understand that my Dermatologist recommends that a skin \"shave\" biopsy be performed today.  A local anesthetic, similar to the kind that a dentist uses when filling a cavity, will be injected with a very small needle into the skin area to be sampled.  The injected skin and tissue underneath \"will go to sleep” and become numb so no pain should be felt afterwards.  An instrument shaped like a tiny \"razor blade\" (shave biopsy instrument) will be used to cut a small piece of tissue and skin from the area so that a sample of tissue can be taken and examined more closely under the microscope.  A slight amount of bleeding will occur, but it will be stopped with direct pressure and a pressure bandage and any other appropriate methods.  I understands that a scar will form where the wound was created.  Surgical ointment will be applied to help protect the wound.  Sutures are not usually needed.    II.  RISKS AND POTENTIAL COMPLICATIONS   I understand the risks and potential complications of a skin biopsy include but are not limited to the following:  Bleeding  Infection  Pain  Scar/keloid  Skin discoloration  Incomplete Removal  Recurrence  Nerve Damage/Numbness/Loss of Function  Allergic Reaction to Anesthesia  Biopsies are diagnostic procedures and based on findings additional treatment or evaluation may be required  Loss or destruction of specimen resulting in no additional findings    My Dermatologist has explained to me the nature of the condition, the nature of the procedure, and the benefits to be reasonably expected compared with alternative approaches.  My Dermatologist has discussed the likelihood of major risks or complications of this procedure including the specific risks listed above, such as bleeding, infection, " "and scarring/keloid.  I understand that a scar is expected after this procedure.  I understand that my physician cannot predict if the scar will form a \"keloid,\" which extends beyond the borders of the wound that is created.  A keloid is a thick, painful, and bumpy scar.  A keloid can be difficult to treat, as it does not always respond well to therapy, which includes injecting cortisone directly into the keloid every few weeks.  While this usually reduces the pain and size of the scar, it does not eliminate it.      I understand that photographs may be taken before and after the procedure.  These will be maintained as part of the medical providers confidential records and may not be made available to me.  I further authorize the medical provider to use the photographs for teaching purposes or to illustrate scientific papers, books, or lectures if in his/her judgment, medical research, education, or science may benefit from its use.    I have had an opportunity to fully inquire about the risks and benefits of this procedure and its alternatives.   I have been given ample time and opportunity to ask questions and to seek a second opinion if I wished to do so.  I acknowledge that there have specifically been no guarantees as to the cosmetic results from the procedure.  I am aware that with any procedure there is always the possibility of an unexpected complication.    III. POST-PROCEDURAL CARE (WHAT YOU WILL NEED TO DO \"AFTER THE BIOPSY\" TO OPTIMIZE HEALING)    Keep the area clean and dry.  Try NOT to remove the bandage or get it wet for the first 24 hours.    Gently clean the area and apply surgical ointment (such as Vaseline petrolatum ointment, which is available \"over the counter\" and not a prescription) to the biopsy site for up to 2 weeks straight.  This acts to protect the wound from the outside world.      Sutures are not usually placed in this procedure.  If any sutures were placed, return for suture removal " as instructed (generally 1 week for the face, 2 weeks for the body).      Take Acetaminophen (Tylenol) for discomfort, if no contraindications.  Ibuprofen or aspirin could make bleeding worse.    Call our office immediately for signs of infection: fever, chills, increased redness, warmth, tenderness, discomfort/pain, or pus or foul smell coming from the wound.    WHAT TO DO IF THERE IS ANY BLEEDING?  If a small amount of bleeding is noticed, place a clean cloth over the area and apply firm pressure for ten minutes.  Check the wound after 10 minutes of direct pressure.  If bleeding persists, try one more time for an additional 10 minutes of direct pressure on the area.  If the bleeding becomes heavier or does not stop after the second attempt, or if you have any other questions about this procedure, then please call your [facility]'s Dermatologist by calling [phone] (SKIN).     I hereby acknowledge that I have reviewed and verified the site with my Dermatologist and have requested and authorized my Dermatologist to proceed with the procedure.

## 2024-01-15 DIAGNOSIS — F41.9 ANXIETY: ICD-10-CM

## 2024-01-15 RX ORDER — ALPRAZOLAM 0.25 MG/1
TABLET ORAL
Qty: 20 TABLET | Refills: 0 | Status: SHIPPED | OUTPATIENT
Start: 2024-01-15

## 2024-01-15 NOTE — TELEPHONE ENCOUNTER
Reason for call:  [x] Refill   [] Prior Auth  [] Other:     Office:   [x] PCP/Provider - [facility]  [] Specialty/Provider -     Medication: Alprazolam 0.25mg    Dose/Frequency: Use 1/2 pill daily as needed,     Quantity: 20    Pharmacy: [facility] - [place], [place] - [address]     Does the patient have enough for 3 days?   [] Yes   [x] No - Send as HP to POD

## 2024-01-16 PROCEDURE — 88342 IMHCHEM/IMCYTCHM 1ST ANTB: CPT | Performed by: DERMATOLOGY

## 2024-01-16 PROCEDURE — 88305 TISSUE EXAM BY PATHOLOGIST: CPT | Performed by: DERMATOLOGY

## 2024-01-16 PROCEDURE — 88341 IMHCHEM/IMCYTCHM EA ADD ANTB: CPT | Performed by: DERMATOLOGY

## 2024-01-16 NOTE — RESULT ENCOUNTER NOTE
DERMATOPATHOLOGY RESULT NOTE    Results reviewed by ordering physician.  Called patient to personally discuss results. Discussed results with patient.       Instructions for Clinical Derm Team:   (remember to route Result Note to appropriate staff):    None    Result & Plan by Specimen:    Specimen A: benign  Plan: monitor and reassured, benign      0 Result Notes     Component   Case Report  Surgical Pathology Report                         Case: [id number]                                  Authorizing Provider:  Ester Fernandez MD     Collected:           01/11/2024 1101              Ordering Location:     [facility]      Received:            01/11/2024 [address] [place] [address]                                                                Pathologist:           Ester Fernandez MD                                                      Specimen:    Skin, Other, right mid back                                                              Final Diagnosis  A. Skin, right mid back, shave biopsy:  Seborrheic keratosis, clonal type; extending to biopsy margins.     Comment: SOX10 and MART1 were reviewed. Features of a melanocytic neoplasm or dermatofibroma were not identified on examined sections.  Electronically signed by Ester Fernandez MD on 1/16/2024 at 12:00 PM  Preliminary result electronically signed by Ester Fernandez MD on 1/15/2024 at  8:35 AM

## 2024-03-01 ENCOUNTER — TELEPHONE (OUTPATIENT)
Age: 49
End: 2024-03-01

## 2024-03-01 NOTE — TELEPHONE ENCOUNTER
Refilled xanax.   She is scheduled with me on 3/25, can offer sooner appointment if I have anything? To discuss daily medication options.

## 2024-03-04 NOTE — TELEPHONE ENCOUNTER
Patient aware refill sent and will discuss a maintenance medication at her next apt at end of march

## 2024-03-25 ENCOUNTER — TELEPHONE (OUTPATIENT)
Age: 49
End: 2024-03-25

## 2024-03-25 ENCOUNTER — OFFICE VISIT (OUTPATIENT)
Dept: INTERNAL MEDICINE CLINIC | Facility: CLINIC | Age: 49
End: 2024-03-25
Payer: COMMERCIAL

## 2024-03-25 VITALS
WEIGHT: 120 LBS | SYSTOLIC BLOOD PRESSURE: 120 MMHG | BODY MASS INDEX: 22.08 KG/M2 | HEART RATE: 66 BPM | OXYGEN SATURATION: 97 % | HEIGHT: 62 IN | TEMPERATURE: 97.3 F | DIASTOLIC BLOOD PRESSURE: 76 MMHG

## 2024-03-25 DIAGNOSIS — F41.8 SITUATIONAL ANXIETY: ICD-10-CM

## 2024-03-25 DIAGNOSIS — R73.03 PREDIABETES: ICD-10-CM

## 2024-03-25 DIAGNOSIS — Z12.11 SCREENING FOR COLON CANCER: ICD-10-CM

## 2024-03-25 DIAGNOSIS — J06.9 ACUTE UPPER RESPIRATORY INFECTION: ICD-10-CM

## 2024-03-25 DIAGNOSIS — Z00.00 ANNUAL PHYSICAL EXAM: Primary | ICD-10-CM

## 2024-03-25 DIAGNOSIS — E78.00 PURE HYPERCHOLESTEROLEMIA: ICD-10-CM

## 2024-03-25 DIAGNOSIS — G43.009 MIGRAINE WITHOUT AURA AND WITHOUT STATUS MIGRAINOSUS, NOT INTRACTABLE: ICD-10-CM

## 2024-03-25 LAB — S PYO AG THROAT QL: NEGATIVE

## 2024-03-25 PROCEDURE — 99396 PREV VISIT EST AGE 40-64: CPT | Performed by: NURSE PRACTITIONER

## 2024-03-25 PROCEDURE — 87880 STREP A ASSAY W/OPTIC: CPT | Performed by: NURSE PRACTITIONER

## 2024-03-25 PROCEDURE — 99214 OFFICE O/P EST MOD 30 MIN: CPT | Performed by: NURSE PRACTITIONER

## 2024-03-25 RX ORDER — AZITHROMYCIN 250 MG/1
TABLET, FILM COATED ORAL DAILY
Qty: 6 TABLET | Refills: 0 | Status: SHIPPED | OUTPATIENT
Start: 2024-03-25 | End: 2024-03-30

## 2024-03-25 RX ORDER — SERTRALINE HYDROCHLORIDE 25 MG/1
25 TABLET, FILM COATED ORAL DAILY
Qty: 30 TABLET | Refills: 0 | Status: SHIPPED | OUTPATIENT
Start: 2024-03-25 | End: 2024-09-21

## 2024-03-25 RX ORDER — ALBUTEROL SULFATE 90 UG/1
2 AEROSOL, METERED RESPIRATORY (INHALATION) EVERY 6 HOURS PRN
Qty: 8 G | Refills: 1 | Status: SHIPPED | OUTPATIENT
Start: 2024-03-25

## 2024-03-25 NOTE — TELEPHONE ENCOUNTER
I did not hear wheezing or tightness on exam. I would recommend starting antibiotics with the albuterol and call if not improving over the next 3-5 days.

## 2024-03-25 NOTE — TELEPHONE ENCOUNTER
Pt called in stating she was in the office today and would like to know if Saida would call in a prescription for Prednisone for her chest congestion. She's requesting a call back.

## 2024-03-25 NOTE — PROGRESS NOTES
ADULT ANNUAL PHYSICAL  [facility] INTERNAL MEDICINE    NAME: Peace Noriega  AGE: 48 y.o. SEX: female  : 1975     DATE: 3/25/2024     Assessment and Plan:     Problem List Items Addressed This Visit       Migraine without aura and without status migrainosus, not intractable     Controlled. Takes imitrex as needed.          Relevant Medications    sertraline (ZOLOFT) 25 mg tablet    Situational anxiety     Needing xanax more often.   Agreeable to start sertraline 25 mg daily. Reviewed side effects of the medication. RTO in 1 month or sooner if needed.          Relevant Medications    sertraline (ZOLOFT) 25 mg tablet    Prediabetes     Check A1C         Pure hypercholesterolemia     Check lipids          Other Visit Diagnoses       Annual physical exam    -  Primary    Relevant Orders    Comprehensive metabolic panel    CBC and differential    Lipid Panel with Direct LDL reflex    Hemoglobin A1C    TSH, 3rd generation with Free T4 reflex    Screening for colon cancer        Relevant Orders    Cologuard    Acute upper respiratory infection        Relevant Medications    albuterol (PROVENTIL HFA,VENTOLIN HFA) 90 mcg/act inhaler    azithromycin (Zithromax) 250 mg tablet    Other Relevant Orders    POCT rapid ANTIGEN strepA (Completed)            Immunizations and preventive care screenings were discussed with patient today. Appropriate education was printed on patient's after visit summary.    Counseling:  Exercise: the importance of regular exercise/physical activity was discussed. Recommend exercise 3-5 times per week for at least 30 minutes.          Return in about 1 month (around 2024) for Next scheduled follow up.     Chief Complaint:     Chief Complaint   Patient presents with    Annual Exam      History of Present Illness:     Adult Annual Physical   Patient here for a comprehensive physical exam. The patient reports  see below .    Cold symptoms  started 4-5 days ago. Had vomiting, cough, fatigue, and sore throat.     Migraines controlled with imitrex.    Needing xanax more recently, anxiety has been worse. She has issues sleeping due to racing thoughts.     Diet and Physical Activity  Diet/Nutrition: well balanced diet.   Exercise: no formal exercise.      Depression Screening  PHQ-2/9 Depression Screening           General Health  Sleep: sleeps poorly.   Hearing: normal - none .  Vision: goes for regular eye exams.   Dental: regular dental visits.       /GYN Health  Follows with gynecology? no   Patient is: postmenopausal  Last menstrual period: over a year ago     Review of Systems:     Review of Systems   Constitutional:  Positive for fatigue. Negative for activity change, appetite change and unexpected weight change.   HENT:  Positive for postnasal drip, rhinorrhea and sore throat. Negative for congestion.    Eyes:  Negative for visual disturbance.   Respiratory:  Positive for cough and chest tightness. Negative for shortness of breath and wheezing.    Cardiovascular:  Negative for chest pain, palpitations and leg swelling.   Gastrointestinal:  Positive for vomiting. Negative for abdominal pain, constipation, diarrhea and nausea.   Genitourinary:  Negative for difficulty urinating.   Musculoskeletal:  Negative for arthralgias.   Skin:  Negative for rash.   Neurological:  Negative for dizziness, light-headedness and headaches.   Psychiatric/Behavioral:  Positive for sleep disturbance. Negative for dysphoric mood and suicidal ideas. The patient is nervous/anxious.       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     Bronchitis     HPV in female     Hypercholesterolemia     200/75/119    Menstrual migraine     Migraines       Past Surgical History:     Past Surgical History:   Procedure Laterality Date    ACHILLES TENDON REPAIR Left     Repair  of Ruptured achilles tendon left    ACHILLES TENDON REPAIR Left     [age]    ELBOW ARTHROPLASTY Left     ELBOW SURGERY      [age]    WISDOM TOOTH EXTRACTION        Social History:     Social History     Socioeconomic History    Marital status: /Civil Union     Spouse name: None    Number of children: 3    Years of education: None    Highest education level: None   Occupational History    Occupation: Full-time employment   Tobacco Use    Smoking status: Never    Smokeless tobacco: Never   Vaping Use    Vaping status: Never Used   Substance and Sexual Activity    Alcohol use: Yes     Comment: socially beer and wine    Drug use: No    Sexual activity: Yes     Partners: Male     Birth control/protection: Male Sterilization   Other Topics Concern    None   Social History Narrative    Caffeine use 2-3 cups/day        3 children 16, 19, 21    Working - OT assistant for [employer]     Social Determinants of Health     Financial Resource Strain: Not on file   Food Insecurity: Not on file   Transportation Needs: Not on file   Physical Activity: Not on file   Stress: Not on file   Social Connections: Not on file   Intimate Partner Violence: Not on file   Housing Stability: Not on file      Family History:     Family History   Problem Relation Age of Onset    Hyperlipidemia Mother         Pure hypocholesterolemia    Melanoma Father     Aortic aneurysm Father     Hypertension Father     Osteoarthritis Father     No Known Problems Sister     No Known Problems Maternal Aunt     Cancer Maternal Grandfather         unknown type or dx age    Hypertension Paternal Grandmother     Parkinsonism Paternal Grandfather     Hypertension Paternal Grandfather     No Known Problems Daughter     Leukemia Cousin 20    Breast cancer Neg Hx     Ovarian cancer Neg Hx       Current Medications:     Current Outpatient Medications   Medication Sig Dispense Refill    albuterol (PROVENTIL HFA,VENTOLIN HFA) 90 mcg/act inhaler Inhale 2 puffs every  "6 (six) hours as needed for wheezing 8 g 1    ALPRAZolam (XANAX) 0.25 mg tablet Use 1/2 pill daily as needed, use sparingly 20 tablet 0    azithromycin (Zithromax) 250 mg tablet Take 2 tablets (500 mg total) by mouth daily for 1 day, THEN 1 tablet (250 mg total) daily for 4 days. 6 tablet 0    sertraline (ZOLOFT) 25 mg tablet Take 1 tablet (25 mg total) by mouth daily 30 tablet 0    acetaminophen (TYLENOL) 325 mg tablet Take 650 mg by mouth every 6 (six) hours as needed for mild pain      SUMAtriptan (IMITREX) 50 mg tablet Take 1 tablet (50 mg total) by mouth as needed for migraine May repeat in 2 hours 9 tablet 6     No current facility-administered medications for this visit.      Allergies:     No Known Allergies   Physical Exam:     /76   Pulse 66   Temp (!) 97.3 °F (36.3 °C)   Ht 5' 2\" (1.575 m)   Wt 54.4 kg (120 lb)   SpO2 97%   BMI 21.95 kg/m²     Physical Exam  Vitals reviewed.   Constitutional:       Appearance: Normal appearance.   HENT:      Head: Normocephalic and atraumatic.      Right Ear: Tympanic membrane, ear canal and external ear normal.      Left Ear: Tympanic membrane, ear canal and external ear normal.      Mouth/Throat:      Pharynx: Posterior oropharyngeal erythema present. No oropharyngeal exudate.   Eyes:      Conjunctiva/sclera: Conjunctivae normal.   Cardiovascular:      Rate and Rhythm: Normal rate and regular rhythm.      Pulses: Normal pulses.      Heart sounds: Normal heart sounds.   Pulmonary:      Effort: Pulmonary effort is normal.      Breath sounds: Normal breath sounds.   Abdominal:      General: Bowel sounds are normal.      Palpations: Abdomen is soft.   Musculoskeletal:         General: No swelling. Normal range of motion.   Lymphadenopathy:      Cervical: No cervical adenopathy.   Skin:     General: Skin is warm and dry.   Neurological:      Mental Status: She is alert and oriented to person, place, and time.   Psychiatric:         Mood and Affect: Mood normal.   "       Behavior: Behavior normal.          CHOCO Balderas  [facility] INTERNAL MEDICINE

## 2024-03-25 NOTE — ASSESSMENT & PLAN NOTE
Needing xanax more often.   Agreeable to start sertraline 25 mg daily. Reviewed side effects of the medication. RTO in 1 month or sooner if needed.

## 2024-03-27 ENCOUNTER — TELEPHONE (OUTPATIENT)
Age: 49
End: 2024-03-27

## 2024-03-27 NOTE — TELEPHONE ENCOUNTER
Pt is taking an antibiotic for URI since Monday.  She does not feel any better.  She is using the inhaler but she feels very congested in her chest and nose.  Non-productive cough.    She is asking if she can use mucinex as well.

## 2024-03-27 NOTE — TELEPHONE ENCOUNTER
Continue inhaler and antibiotics. Ok to add mucinex twice daily. Or she can try robitussin or delsym.

## 2024-03-31 ENCOUNTER — OFFICE VISIT (OUTPATIENT)
Dept: URGENT CARE | Age: 49
End: 2024-03-31
Payer: COMMERCIAL

## 2024-03-31 VITALS
SYSTOLIC BLOOD PRESSURE: 106 MMHG | HEART RATE: 60 BPM | BODY MASS INDEX: 22.08 KG/M2 | WEIGHT: 120 LBS | DIASTOLIC BLOOD PRESSURE: 59 MMHG | OXYGEN SATURATION: 100 % | TEMPERATURE: 97.9 F | RESPIRATION RATE: 18 BRPM | HEIGHT: 62 IN

## 2024-03-31 DIAGNOSIS — J01.90 ACUTE SINUSITIS, RECURRENCE NOT SPECIFIED, UNSPECIFIED LOCATION: Primary | ICD-10-CM

## 2024-03-31 DIAGNOSIS — R05.1 ACUTE COUGH: ICD-10-CM

## 2024-03-31 PROCEDURE — 99213 OFFICE O/P EST LOW 20 MIN: CPT | Performed by: EMERGENCY MEDICINE

## 2024-03-31 RX ORDER — PREDNISONE 50 MG/1
50 TABLET ORAL DAILY
Qty: 5 TABLET | Refills: 0 | Status: SHIPPED | OUTPATIENT
Start: 2024-03-31 | End: 2024-04-05

## 2024-03-31 RX ORDER — PREDNISONE 50 MG/1
50 TABLET ORAL DAILY
Qty: 5 TABLET | Refills: 0 | Status: SHIPPED | OUTPATIENT
Start: 2024-03-31 | End: 2024-03-31

## 2024-03-31 NOTE — PATIENT INSTRUCTIONS
Start prednisone as prescribed  Vitamin D3 2000 IU daily  Vitamin C 1000mg twice per day  Multivitamin daily  Fluids and rest  Over the counter cold medication as needed (EX: Mucinex, tylenol/motrin)  Follow up with PCP in 3-5 days.  Proceed to ER if symptoms worsen.

## 2024-03-31 NOTE — PROGRESS NOTES
[facility]        NAME: Peace Noriega is a 48 y.o. female  : 1975    MRN: [id number]  DATE: 2024  TIME: 9:18 AM    Assessment and Plan   Acute sinusitis, recurrence not specified, unspecified location [J01.90]  1. Acute sinusitis, recurrence not specified, unspecified location  predniSONE 50 mg tablet    DISCONTINUED: predniSONE 50 mg tablet      2. Acute cough  predniSONE 50 mg tablet    DISCONTINUED: predniSONE 50 mg tablet            Patient Instructions     Start prednisone as prescribed  Vitamin D3 2000 IU daily  Vitamin C 1000mg twice per day  Multivitamin daily  Fluids and rest  Over the counter cold medication as needed (EX: Mucinex, tylenol/motrin)  Follow up with PCP in 3-5 days.  Proceed to  ER if symptoms worsen.    If tests are performed, our office will contact you with results only if changes need to made to the care plan discussed with you at the visit. [facility]    Chief Complaint     Chief Complaint   Patient presents with    Cough     Cough, nasal congestion, sinus pressure, chest congestion x 10 days         History of Present Illness       Patient is a 47 yo female with no significant PMH presenting in the clinic today for cold sx x 10 days. Admits cough, congestion, sinus pressure, and fatigue. Denies fever, chills, body aches, sore throat, ear pain, headache, chest pain, SOB, abdominal pain, n/v/d. Patient was seen by her PCP on 3/25/24, diagnosed with a URI, and treated with azithromycin and albuterol inhaler. Admits the use of azithromycin, Mucinex, albuterol inhaler, tylenol, and Advil for sx management. Denies recent sick contacts.         Review of Systems   Review of Systems   Constitutional:  Positive for fatigue. Negative for chills and fever.   HENT:  Positive for congestion and sinus pressure. Negative for ear pain, postnasal drip, rhinorrhea, sinus pain and sore throat.    Respiratory:  Positive for  cough. Negative for shortness of breath.    Cardiovascular:  Negative for chest pain.   Gastrointestinal:  Negative for abdominal pain, diarrhea, nausea and vomiting.   Musculoskeletal:  Negative for myalgias.   Skin:  Negative for rash.   Neurological:  Negative for headaches.         Current Medications       Current Outpatient Medications:     acetaminophen (TYLENOL) 325 mg tablet, Take 650 mg by mouth every 6 (six) hours as needed for mild pain, Disp: , Rfl:     albuterol (PROVENTIL HFA,VENTOLIN HFA) 90 mcg/act inhaler, Inhale 2 puffs every 6 (six) hours as needed for wheezing, Disp: 8 g, Rfl: 1    ALPRAZolam (XANAX) 0.25 mg tablet, Use 1/2 pill daily as needed, use sparingly, Disp: 20 tablet, Rfl: 0    predniSONE 50 mg tablet, Take 1 tablet (50 mg total) by mouth daily for 5 days, Disp: 5 tablet, Rfl: 0    sertraline (ZOLOFT) 25 mg tablet, Take 1 tablet (25 mg total) by mouth daily, Disp: 30 tablet, Rfl: 0    SUMAtriptan (IMITREX) 50 mg tablet, Take 1 tablet (50 mg total) by mouth as needed for migraine May repeat in 2 hours, Disp: 9 tablet, Rfl: 6    Current Allergies     Allergies as of 03/31/2024    (No Known Allergies)            The following portions of the patient's history were reviewed and updated as appropriate: allergies, current medications, past family history, past medical history, past social history, past surgical history and problem list.     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     Bronchitis     HPV in female     Hypercholesterolemia     200/75/119    Menstrual migraine     Migraines        Past Surgical History:   Procedure Laterality Date    ACHILLES TENDON REPAIR Left     Repair of Ruptured achilles tendon left    ACHILLES TENDON REPAIR Left     [age]    ELBOW ARTHROPLASTY Left     ELBOW SURGERY      teen    WISDOM TOOTH EXTRACTION         Family History   Problem Relation Age of Onset     "Hyperlipidemia Mother         Pure hypocholesterolemia    Melanoma Father     Aortic aneurysm Father     Hypertension Father     Osteoarthritis Father     No Known Problems Sister     No Known Problems Maternal Aunt     Cancer Maternal Grandfather         unknown type or dx age    Hypertension Paternal Grandmother     Parkinsonism Paternal Grandfather     Hypertension Paternal Grandfather     No Known Problems Daughter     Leukemia Cousin 20    Breast cancer Neg Hx     Ovarian cancer Neg Hx          Medications have been verified.        Objective   /59   Pulse 60   Temp 97.9 °F (36.6 °C)   Resp 18   Ht 5' 2\" (1.575 m)   Wt 54.4 kg (120 lb)   SpO2 100%   BMI 21.95 kg/m²        Physical Exam     Physical Exam  Vitals reviewed.   Constitutional:       General: She is not in acute distress.     Appearance: Normal appearance. She is normal weight. She is not ill-appearing.   HENT:      Head: Normocephalic.      Right Ear: Hearing, tympanic membrane, ear canal and external ear normal. No middle ear effusion. There is no impacted cerumen. Tympanic membrane is not erythematous or bulging.      Left Ear: Hearing, tympanic membrane, ear canal and external ear normal.  No middle ear effusion. There is no impacted cerumen. Tympanic membrane is not erythematous or bulging.      Nose: Congestion present. No rhinorrhea.      Right Sinus: Maxillary sinus tenderness and frontal sinus tenderness present.      Left Sinus: Maxillary sinus tenderness and frontal sinus tenderness present.      Mouth/Throat:      Lips: Pink.      Mouth: Mucous membranes are moist.      Pharynx: Oropharynx is clear. Uvula midline. No pharyngeal swelling, oropharyngeal exudate, posterior oropharyngeal erythema or uvula swelling.   Eyes:      General:         Right eye: No discharge.         Left eye: No discharge.      Conjunctiva/sclera: Conjunctivae normal.   Cardiovascular:      Rate and Rhythm: Normal rate and regular rhythm.      Pulses: " Normal pulses.      Heart sounds: Normal heart sounds. No murmur heard.     No friction rub. No gallop.   Pulmonary:      Effort: Pulmonary effort is normal. No respiratory distress.      Breath sounds: Normal breath sounds. No stridor. No wheezing, rhonchi or rales.   Musculoskeletal:      Cervical back: Normal range of motion and neck supple. No tenderness.   Lymphadenopathy:      Cervical: No cervical adenopathy.   Skin:     General: Skin is warm.      Findings: No rash.   Neurological:      Mental Status: She is alert.   Psychiatric:         Mood and Affect: Mood normal.         Behavior: Behavior normal.

## 2024-04-22 ENCOUNTER — TELEMEDICINE (OUTPATIENT)
Dept: INTERNAL MEDICINE CLINIC | Facility: CLINIC | Age: 49
End: 2024-04-22
Payer: COMMERCIAL

## 2024-04-22 DIAGNOSIS — R00.2 PALPITATIONS: ICD-10-CM

## 2024-04-22 DIAGNOSIS — F41.8 SITUATIONAL ANXIETY: Primary | ICD-10-CM

## 2024-04-22 PROCEDURE — 99213 OFFICE O/P EST LOW 20 MIN: CPT | Performed by: NURSE PRACTITIONER

## 2024-04-22 NOTE — ASSESSMENT & PLAN NOTE
Increase sertraline to 50 mg daily.   Continue xanax if needed.   Advised to call the office or send a [employer] message in about a month with an update on how she's doing

## 2024-04-22 NOTE — PROGRESS NOTES
Virtual Regular Visit    Verification of patient location:    Patient is located at Other in the following state in which I hold an active license [place]      Assessment/Plan:    Problem List Items Addressed This Visit       Situational anxiety - Primary     Increase sertraline to 50 mg daily.   Continue xanax if needed.   Advised to call the office or send a froolyt message in about a month with an update on how she's doing          Relevant Medications    sertraline (ZOLOFT) 50 mg tablet     Other Visit Diagnoses       Palpitations        check labs/holter monitor.    Relevant Orders    Holter monitor                 Reason for visit is No chief complaint on file.       Encounter provider CHOCO Balderas    Provider located at [facility] INTERNAL MEDICINE  [address]  [place] [address]      Recent Visits  No visits were found meeting these conditions.  Showing recent visits within past 7 days and meeting all other requirements  Today's Visits  Date Type Provider Dept   04/22/24 Telemedicine CHOCO Balderas [facility] Internal Med   Showing today's visits and meeting all other requirements  Future Appointments  No visits were found meeting these conditions.  Showing future appointments within next 150 days and meeting all other requirements       The patient was identified by name and date of birth. Peace Noriega was informed that this is a telemedicine visit and that the visit is being conducted through the Epic Embedded platform. She agrees to proceed..  My office door was closed. No one else was in the room.  She acknowledged consent and understanding of privacy and security of the video platform. The patient has agreed to participate and understands they can discontinue the visit at any time.    Patient is aware this is a billable service.     Subjective  Peace Noriega is a 48 y.o. female anxiety follow up .  She was started on  sertraline about 1 month ago. For the first week or so she felt flat and non emotional. That has since passed. She does notice less anxiety during the day.   However at night she still has some anxiety, has taken xanax about 2-3 times in the last month.   She is waking up some nights with her heart racing. She denies lightheadedness or shortness of breath.   She has also noted less migraines since starting on the sertraline.        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     Bronchitis     HPV in female     Hypercholesterolemia     200/75/119    Menstrual migraine     Migraines        Past Surgical History:   Procedure Laterality Date    ACHILLES TENDON REPAIR Left     Repair of Ruptured achilles tendon left    ACHILLES TENDON REPAIR Left     teen    ELBOW ARTHROPLASTY Left     ELBOW SURGERY      teen    WISDOM TOOTH EXTRACTION         Current Outpatient Medications   Medication Sig Dispense Refill    sertraline (ZOLOFT) 50 mg tablet Take 1 tablet (50 mg total) by mouth daily 90 tablet 0    acetaminophen (TYLENOL) 325 mg tablet Take 650 mg by mouth every 6 (six) hours as needed for mild pain      albuterol (PROVENTIL HFA,VENTOLIN HFA) 90 mcg/act inhaler Inhale 2 puffs every 6 (six) hours as needed for wheezing 8 g 1    ALPRAZolam (XANAX) 0.25 mg tablet Use 1/2 pill daily as needed, use sparingly 20 tablet 0    SUMAtriptan (IMITREX) 50 mg tablet Take 1 tablet (50 mg total) by mouth as needed for migraine May repeat in 2 hours 9 tablet 6     No current facility-administered medications for this visit.        No Known Allergies    Review of Systems   Constitutional:  Negative for activity change, appetite change and fatigue.   Respiratory:  Negative for shortness of breath.    Cardiovascular:  Positive for palpitations. Negative for chest pain.   Gastrointestinal:  Negative for abdominal pain.   Neurological:  Negative for  dizziness, light-headedness and headaches.   Psychiatric/Behavioral:  Positive for sleep disturbance. Negative for dysphoric mood. The patient is not nervous/anxious.        Video Exam    There were no vitals filed for this visit.    Physical Exam  Constitutional:       Appearance: She is well-developed.   HENT:      Head: Normocephalic.   Eyes:      Conjunctiva/sclera: Conjunctivae normal.   Pulmonary:      Effort: Pulmonary effort is normal.   Neurological:      Mental Status: She is alert and oriented to person, place, and time.   Psychiatric:         Behavior: Behavior normal.          Visit Time  Total Visit Duration: 11

## 2024-05-17 ENCOUNTER — APPOINTMENT (OUTPATIENT)
Dept: LAB | Facility: CLINIC | Age: 49
End: 2024-05-17
Payer: COMMERCIAL

## 2024-05-17 DIAGNOSIS — Z00.00 ANNUAL PHYSICAL EXAM: ICD-10-CM

## 2024-05-17 LAB
ALBUMIN SERPL BCP-MCNC: 4.7 G/DL (ref 3.5–5)
ALP SERPL-CCNC: 54 U/L (ref 34–104)
ALT SERPL W P-5'-P-CCNC: 11 U/L (ref 7–52)
ANION GAP SERPL CALCULATED.3IONS-SCNC: 4 MMOL/L (ref 4–13)
AST SERPL W P-5'-P-CCNC: 19 U/L (ref 13–39)
BASOPHILS # BLD AUTO: 0.03 THOUSANDS/ÂΜL (ref 0–0.1)
BASOPHILS NFR BLD AUTO: 1 % (ref 0–1)
BILIRUB SERPL-MCNC: 0.43 MG/DL (ref 0.2–1)
BUN SERPL-MCNC: 15 MG/DL (ref 5–25)
CALCIUM SERPL-MCNC: 9.8 MG/DL (ref 8.4–10.2)
CHLORIDE SERPL-SCNC: 102 MMOL/L (ref 96–108)
CHOLEST SERPL-MCNC: 203 MG/DL
CO2 SERPL-SCNC: 31 MMOL/L (ref 21–32)
CREAT SERPL-MCNC: 0.8 MG/DL (ref 0.6–1.3)
EOSINOPHIL # BLD AUTO: 0.08 THOUSAND/ÂΜL (ref 0–0.61)
EOSINOPHIL NFR BLD AUTO: 2 % (ref 0–6)
ERYTHROCYTE [DISTWIDTH] IN BLOOD BY AUTOMATED COUNT: 12.8 % (ref 11.6–15.1)
EST. AVERAGE GLUCOSE BLD GHB EST-MCNC: 117 MG/DL
GFR SERPL CREATININE-BSD FRML MDRD: 86 ML/MIN/1.73SQ M
GLUCOSE P FAST SERPL-MCNC: 84 MG/DL (ref 65–99)
HBA1C MFR BLD: 5.7 %
HCT VFR BLD AUTO: 41.5 % (ref 34.8–46.1)
HDLC SERPL-MCNC: 94 MG/DL
HGB BLD-MCNC: 13.1 G/DL (ref 11.5–15.4)
IMM GRANULOCYTES # BLD AUTO: 0.01 THOUSAND/UL (ref 0–0.2)
IMM GRANULOCYTES NFR BLD AUTO: 0 % (ref 0–2)
LDLC SERPL CALC-MCNC: 93 MG/DL (ref 0–100)
LYMPHOCYTES # BLD AUTO: 1.6 THOUSANDS/ÂΜL (ref 0.6–4.47)
LYMPHOCYTES NFR BLD AUTO: 35 % (ref 14–44)
MCH RBC QN AUTO: 30.3 PG (ref 26.8–34.3)
MCHC RBC AUTO-ENTMCNC: 31.6 G/DL (ref 31.4–37.4)
MCV RBC AUTO: 96 FL (ref 82–98)
MONOCYTES # BLD AUTO: 0.27 THOUSAND/ÂΜL (ref 0.17–1.22)
MONOCYTES NFR BLD AUTO: 6 % (ref 4–12)
NEUTROPHILS # BLD AUTO: 2.58 THOUSANDS/ÂΜL (ref 1.85–7.62)
NEUTS SEG NFR BLD AUTO: 56 % (ref 43–75)
NRBC BLD AUTO-RTO: 0 /100 WBCS
PLATELET # BLD AUTO: 257 THOUSANDS/UL (ref 149–390)
PMV BLD AUTO: 11 FL (ref 8.9–12.7)
POTASSIUM SERPL-SCNC: 4.8 MMOL/L (ref 3.5–5.3)
PROT SERPL-MCNC: 7.6 G/DL (ref 6.4–8.4)
RBC # BLD AUTO: 4.32 MILLION/UL (ref 3.81–5.12)
SODIUM SERPL-SCNC: 137 MMOL/L (ref 135–147)
TRIGL SERPL-MCNC: 79 MG/DL
TSH SERPL DL<=0.05 MIU/L-ACNC: 1.71 UIU/ML (ref 0.45–4.5)
WBC # BLD AUTO: 4.57 THOUSAND/UL (ref 4.31–10.16)

## 2024-05-17 PROCEDURE — 85025 COMPLETE CBC W/AUTO DIFF WBC: CPT

## 2024-05-17 PROCEDURE — 80061 LIPID PANEL: CPT

## 2024-05-17 PROCEDURE — 36415 COLL VENOUS BLD VENIPUNCTURE: CPT

## 2024-05-17 PROCEDURE — 84443 ASSAY THYROID STIM HORMONE: CPT

## 2024-05-17 PROCEDURE — 80053 COMPREHEN METABOLIC PANEL: CPT

## 2024-05-17 PROCEDURE — 83036 HEMOGLOBIN GLYCOSYLATED A1C: CPT

## 2024-05-20 ENCOUNTER — TELEPHONE (OUTPATIENT)
Dept: INTERNAL MEDICINE CLINIC | Facility: CLINIC | Age: 49
End: 2024-05-20

## 2024-06-10 ENCOUNTER — HOSPITAL ENCOUNTER (OUTPATIENT)
Dept: MAMMOGRAPHY | Facility: MEDICAL CENTER | Age: 49
Discharge: HOME | End: 2024-06-10
Payer: COMMERCIAL

## 2024-06-10 VITALS — BODY MASS INDEX: 22.08 KG/M2 | HEIGHT: 62 IN | WEIGHT: 120 LBS

## 2024-06-10 DIAGNOSIS — Z12.31 ENCOUNTER FOR SCREENING MAMMOGRAM FOR MALIGNANT NEOPLASM OF BREAST: ICD-10-CM

## 2024-06-10 PROCEDURE — 77067 SCR MAMMO BI INCL CAD: CPT

## 2024-06-10 PROCEDURE — 77063 BREAST TOMOSYNTHESIS BI: CPT

## 2024-06-17 ENCOUNTER — TELEPHONE (OUTPATIENT)
Dept: INTERNAL MEDICINE CLINIC | Facility: CLINIC | Age: 49
End: 2024-06-17

## 2024-06-17 DIAGNOSIS — J30.89 ALLERGIC RHINITIS DUE TO OTHER ALLERGIC TRIGGER, UNSPECIFIED SEASONALITY: Primary | ICD-10-CM

## 2024-06-17 RX ORDER — FLUTICASONE PROPIONATE 50 MCG
1 SPRAY, SUSPENSION (ML) NASAL DAILY
Qty: 16 ML | Refills: 2 | Status: SHIPPED | OUTPATIENT
Start: 2024-06-17

## 2024-06-17 NOTE — TELEPHONE ENCOUNTER
Pt. requesting refill of Fluticasone Proprionate 50mcg s one spray into nostril daily. Not on med list.

## 2024-07-24 ENCOUNTER — OFFICE VISIT (OUTPATIENT)
Dept: INTERNAL MEDICINE CLINIC | Facility: CLINIC | Age: 49
End: 2024-07-24
Payer: COMMERCIAL

## 2024-07-24 VITALS
DIASTOLIC BLOOD PRESSURE: 80 MMHG | HEART RATE: 65 BPM | OXYGEN SATURATION: 99 % | HEIGHT: 62 IN | WEIGHT: 122.2 LBS | BODY MASS INDEX: 22.49 KG/M2 | SYSTOLIC BLOOD PRESSURE: 120 MMHG | TEMPERATURE: 97 F

## 2024-07-24 DIAGNOSIS — J30.89 ALLERGIC RHINITIS DUE TO OTHER ALLERGIC TRIGGER, UNSPECIFIED SEASONALITY: Primary | ICD-10-CM

## 2024-07-24 DIAGNOSIS — M77.02 BILATERAL MEDIAL EPICONDYLITIS OF ELBOW JOINT: ICD-10-CM

## 2024-07-24 DIAGNOSIS — M77.01 BILATERAL MEDIAL EPICONDYLITIS OF ELBOW JOINT: ICD-10-CM

## 2024-07-24 DIAGNOSIS — M25.50 ARTHRALGIA, UNSPECIFIED JOINT: ICD-10-CM

## 2024-07-24 PROCEDURE — 99213 OFFICE O/P EST LOW 20 MIN: CPT | Performed by: INTERNAL MEDICINE

## 2024-07-24 NOTE — PROGRESS NOTES
"Ambulatory Visit  Name: Peace Noriega      : 1975      MRN: [id number]  Encounter Provider: Shauna Camarillo MD  Encounter Date: 2024   Encounter department: [facility] INTERNAL MEDICINE    Assessment & Plan   1. Allergic rhinitis due to other allergic trigger, unspecified seasonality  Assessment & Plan:  Using Flonase daily, add saline rinse daily for the next few days.  May use saline spray prn.  2. Bilateral medial epicondylitis of elbow joint  Comments:  Start stretching exercises, this was demonstrated to her.  3. Arthralgia, unspecified joint  -     Lyme Total AB W Reflex to IGM/IGG; Future  -     VERENICE Screen w/ Reflex to Titer/Pattern; Future  -     Cyclic citrul peptide antibody, IgG  -     RF Screen w/ Reflex to Titer  -     C-reactive protein    Follow up as scheduled or as needed.       History of Present Illness     She complains of right ear fullness and discomfort since 2 days ago.  She swims, does not wear ear plugs. She uses a  since she clenches her jaw. No recent jaw pain, no issues with eating or chewing.   She uses Flonase daily. No cough, sore throat.    She also complains of joint pains. She complains of bilateral elbow pain, sometimes her wrists hurt also. No known injury, no swelling. She is asking if it is due to menopause.  She found a tick on her recently, no rash. She recalls having a scar from a previous tick bite several year ago.      Review of Systems   Constitutional:  Negative for fatigue and fever.   HENT:  Positive for ear pain. Negative for postnasal drip, rhinorrhea, sneezing and sore throat.    Musculoskeletal:  Positive for arthralgias. Negative for back pain, joint swelling and myalgias.   Skin:  Negative for rash.   Neurological:  Negative for headaches.       Objective     /80   Pulse 65   Temp (!) 97 °F (36.1 °C)   Ht 5' 2\" (1.575 m)   Wt 55.4 kg (122 lb 3.2 oz)   LMP 2022 (Approximate)   SpO2 99%   BMI 22.35 " kg/m²     Physical Exam  Constitutional:       Appearance: Normal appearance.   HENT:      Head: Normocephalic and atraumatic.      Right Ear: Tympanic membrane, ear canal and external ear normal.      Left Ear: Tympanic membrane, ear canal and external ear normal.      Nose: Congestion and rhinorrhea present.      Mouth/Throat:      Mouth: Mucous membranes are moist.   Eyes:      Pupils: Pupils are equal, round, and reactive to light.   Pulmonary:      Effort: Pulmonary effort is normal.      Breath sounds: Normal breath sounds.   Musculoskeletal:      Right elbow: Tenderness present in medial epicondyle.      Left elbow: Tenderness present in medial epicondyle.   Neurological:      General: No focal deficit present.      Mental Status: She is alert and oriented to person, place, and time.   Psychiatric:         Mood and Affect: Mood normal.       Administrative Statements

## 2024-08-02 DIAGNOSIS — G43.709 CHRONIC MIGRAINE WITHOUT AURA WITHOUT STATUS MIGRAINOSUS, NOT INTRACTABLE: ICD-10-CM

## 2024-08-02 DIAGNOSIS — F41.8 SITUATIONAL ANXIETY: ICD-10-CM

## 2024-08-02 RX ORDER — SUMATRIPTAN 50 MG/1
50 TABLET, FILM COATED ORAL AS NEEDED
Qty: 9 TABLET | Refills: 6 | Status: SHIPPED | OUTPATIENT
Start: 2024-08-02

## 2024-08-02 NOTE — TELEPHONE ENCOUNTER
Reason for call:   [x] Refill   [] Prior Auth  [] Other:     Office:   [x] PCP/Provider -   [] Specialty/Provider -     IMITREX   50 MG    SERTRALINE  50 MG    [facility] [place]    Does the patient have enough for 3 days?   [x] Yes   [] No - Send as HP to POD

## 2024-08-16 ENCOUNTER — APPOINTMENT (OUTPATIENT)
Dept: LAB | Facility: CLINIC | Age: 49
End: 2024-08-16
Payer: COMMERCIAL

## 2024-08-16 DIAGNOSIS — M25.50 ARTHRALGIA, UNSPECIFIED JOINT: ICD-10-CM

## 2024-08-16 LAB
ANA SER QL IA: NEGATIVE
B BURGDOR IGG+IGM SER QL IA: NEGATIVE
CRP SERPL QL: <1 MG/L

## 2024-08-16 PROCEDURE — 36415 COLL VENOUS BLD VENIPUNCTURE: CPT

## 2024-08-16 PROCEDURE — 86038 ANTINUCLEAR ANTIBODIES: CPT

## 2024-08-16 PROCEDURE — 86618 LYME DISEASE ANTIBODY: CPT

## 2024-08-18 LAB — RHEUMATOID FACT SER QL LA: NEGATIVE

## 2024-08-20 LAB — CCP AB SER IA-ACNC: 0.8

## 2024-08-27 DIAGNOSIS — M25.50 ARTHRALGIA, UNSPECIFIED JOINT: Primary | ICD-10-CM

## 2024-08-27 NOTE — RESULT ENCOUNTER NOTE
Your labs were all normal. If you are still having a lot of joint pain and/or swelling, I can refer you to rheumatology. Let me know.     [employer] message already sent.

## 2024-11-05 ENCOUNTER — ANNUAL EXAM (OUTPATIENT)
Dept: OBGYN CLINIC | Facility: CLINIC | Age: 49
End: 2024-11-05
Payer: COMMERCIAL

## 2024-11-05 VITALS
DIASTOLIC BLOOD PRESSURE: 70 MMHG | BODY MASS INDEX: 22.63 KG/M2 | HEIGHT: 62 IN | SYSTOLIC BLOOD PRESSURE: 112 MMHG | WEIGHT: 123 LBS

## 2024-11-05 DIAGNOSIS — Z01.419 ENCOUNTER FOR ANNUAL ROUTINE GYNECOLOGICAL EXAMINATION: Primary | ICD-10-CM

## 2024-11-05 DIAGNOSIS — Z12.11 COLON CANCER SCREENING: ICD-10-CM

## 2024-11-05 DIAGNOSIS — Z12.31 ENCOUNTER FOR SCREENING MAMMOGRAM FOR BREAST CANCER: ICD-10-CM

## 2024-11-05 PROCEDURE — G0145 SCR C/V CYTO,THINLAYER,RESCR: HCPCS | Performed by: OBSTETRICS & GYNECOLOGY

## 2024-11-05 PROCEDURE — S0612 ANNUAL GYNECOLOGICAL EXAMINA: HCPCS | Performed by: OBSTETRICS & GYNECOLOGY

## 2024-11-05 RX ORDER — CETIRIZINE HYDROCHLORIDE 10 MG/1
10 TABLET, CHEWABLE ORAL DAILY
COMMUNITY

## 2024-11-05 NOTE — PROGRESS NOTES
Ambulatory Visit  Name: Peace Noriega      : 1975      MRN: [id number]  Encounter Provider: Shena Portillo DO  Encounter Date: 2024   Encounter department: [facility]    Assessment & Plan  Encounter for annual routine gynecological examination    Orders:    Liquid-based pap, screening    Encounter for screening mammogram for breast cancer    Orders:    Mammo screening bilateral w 3d and cad; Future    Colon cancer screening    Orders:    Ambulatory Referral to Gastroenterology; Future    Pap done for cytology reflex HPV.  Encouraged self breast examination as well as calcium supplementation.  Continue annual mammogram.  Reviewed colon cancer screening, recommend GI evaluation colon cancer screening as well as epigastric pain.  She will continue to follow-up with primary care, neurology as scheduled.  She is also scheduled for rheumatology consultation next month diffuse joint pain.  Return to office in 1 year or as needed    History of Present Illness     Peace Noriega is a 49 y.o. female who presents     This is a pleasant 49-year-old female P3 ( x 3, age 26, 24, 21) presents for her GYN exam.  She went through menopause at age 47.  She has never been on hormone replacement therapy.  She denies any vaginal bleeding or spotting.  No changes in bowel or bladder function.  She is sexually active, monogamous relationship for 10 years, method of contraception vasectomy.    She does follow-up with her family physician on a regular basis, neurology for history of migraine headaches uses Imitrex several times per month.  She has had diffuse joint pain and is referred to rheumatology.    History obtained from : patient  Review of Systems   Constitutional:  Negative for fatigue, fever and unexpected weight change.   Respiratory:  Negative for cough, chest tightness, shortness of breath and wheezing.    Cardiovascular: Negative.  Negative for chest pain and palpitations.  "  Gastrointestinal: Negative.  Negative for abdominal distention, abdominal pain, blood in stool, constipation, diarrhea, nausea and vomiting.   Genitourinary: Negative.  Negative for difficulty urinating, dyspareunia, dysuria, flank pain, frequency, genital sores, hematuria, pelvic pain, urgency, vaginal bleeding, vaginal discharge and vaginal pain.   Skin:  Negative for rash.     Current Outpatient Medications on File Prior to Visit   Medication Sig Dispense Refill    acetaminophen (TYLENOL) 325 mg tablet Take 650 mg by mouth every 6 (six) hours as needed for mild pain      albuterol (PROVENTIL HFA,VENTOLIN HFA) 90 mcg/act inhaler Inhale 2 puffs every 6 (six) hours as needed for wheezing 8 g 1    ALPRAZolam (XANAX) 0.25 mg tablet Use 1/2 pill daily as needed, use sparingly 20 tablet 0    cetirizine (ZyrTEC) 10 MG chewable tablet Chew 10 mg daily      fluticasone (FLONASE) 50 mcg/act nasal spray 1 spray into each nostril daily 16 mL 2    sertraline (ZOLOFT) 50 mg tablet Take 1 tablet (50 mg total) by mouth daily 100 tablet 0    SUMAtriptan (IMITREX) 50 mg tablet Take 1 tablet (50 mg total) by mouth as needed for migraine May repeat in 2 hours 9 tablet 6     No current facility-administered medications on file prior to visit.          Objective     /70   Ht 5' 2\" (1.575 m)   Wt 55.8 kg (123 lb)   LMP 11/11/2022 (Approximate)   BMI 22.50 kg/m²     Physical Exam  Constitutional:       Appearance: Normal appearance. She is well-developed.   HENT:      Head: Normocephalic and atraumatic.   Cardiovascular:      Rate and Rhythm: Normal rate and regular rhythm.   Pulmonary:      Effort: Pulmonary effort is normal.      Breath sounds: Normal breath sounds.   Chest:   Breasts:     Right: No inverted nipple, mass, nipple discharge, skin change or tenderness.      Left: No inverted nipple, mass, nipple discharge, skin change or tenderness.   Abdominal:      General: Bowel sounds are normal. There is no distension.    "   Palpations: Abdomen is soft.      Tenderness: There is no abdominal tenderness. There is no guarding or rebound.   Genitourinary:     Labia:         Right: No rash, tenderness or lesion.         Left: No rash, tenderness or lesion.       Vagina: Normal. No signs of injury. No vaginal discharge or tenderness.      Cervix: No cervical motion tenderness, discharge, friability, lesion or cervical bleeding.      Uterus: Not enlarged and not tender.       Adnexa:         Right: No mass, tenderness or fullness.          Left: No mass, tenderness or fullness.     Neurological:      Mental Status: She is alert.   Psychiatric:         Behavior: Behavior normal.       Administrative Statements   I have spent a total time of 30 minutes in caring for this patient on the day of the visit/encounter including Risks and benefits of tx options, Instructions for management, Impressions, Counseling / Coordination of care, Documenting in the medical record, Reviewing / ordering tests, medicine, procedures  , and Obtaining or reviewing history  .

## 2024-11-06 ENCOUNTER — PREP FOR PROCEDURE (OUTPATIENT)
Age: 49
End: 2024-11-06

## 2024-11-06 ENCOUNTER — TELEPHONE (OUTPATIENT)
Age: 49
End: 2024-11-06

## 2024-11-06 DIAGNOSIS — Z12.11 SCREENING FOR COLON CANCER: Primary | ICD-10-CM

## 2024-11-06 NOTE — TELEPHONE ENCOUNTER
11/06/24  Screened by: Khloe Terrell    Referring Provider Kenisha    Pre- Screening:     There is no height or weight on file to calculate BMI.  Has patient been referred for a routine screening Colonoscopy? yes  Is the patient between 45-75 years old? yes      Previous Colonoscopy no   If yes:    Date:     Facility:     Reason:       Does the patient want to see a Gastroenterologist prior to their procedure OR are they having any GI symptoms? no    Has the patient been hospitalized or had abdominal surgery in the past 6 months? no    Does the patient use supplemental oxygen? no    Does the patient take Coumadin, Lovenox, Plavix, Elliquis, Xarelto, or other blood thinning medication? no    Has the patient had a stroke, cardiac event, or stent placed in the past year? no      If patient is between 45yrs - 49yrs, please advise patient that we will have to confirm benefits & coverage with their insurance company for a routine screening colonoscopy.

## 2024-11-06 NOTE — TELEPHONE ENCOUNTER
Scheduled date of colonoscopy (as of today): 12/19/24  Physician performing colonoscopy: Maryana  Location of colonoscopy: [facility]  Bowel prep reviewed with patient: SEBASTIÁN/GARDENIA sent to My chart  Instructions reviewed with patient by: SF  Clearances: N/A    :  Naya Mittal [phone]

## 2024-11-06 NOTE — LETTER
Chris Hand,    Attached are your prep instructions for your upcoming procedure on 12/19/24. If you have any questions, please give us a call at [phone].    Thank you,    [facility]         Medicine Instructions for Adults with Diabetes who Need a Bowel Prep       Follow these instructions when a BOWEL PREP is required for your procedure or surgery!    NOTE:   GLP-1 Agonists taken weekly: do not take in the 7 days before your procedure   SGLT-2 Inhibitors: do not take in the 4 days before your procedure     On the Day Before Surgery/Procedure  If you are having a procedure (e.g. Colonoscopy) or surgery that requires a bowel prep and you may have at least a clear liquid diet, follow the directions below based on the type of medicine you take for your diabetes.     Type of Medicine You Take Examples What to do   Pre-Mixed Insulin - Intermediate Acting Humalog® 75/25, Humulin® 70/30, Novolog® 70/30, Regular Insulin Take ½ your regular dose the evening before your procedure   Rapid/Fast Acting Insulin Humalog® U200, NovoLog®, Apidra®, Fiasp® Take ½ your regular dose the evening before your procedure.   Long-Acting Insulin Lantus®, Levemir®, Tresiba®, Toujeo®, Basaglar® Take your FULL regular dose the day before procedure   Oral Sulfonylurea Glipizide/Glimepiride/Glucotrol® Take ½ your regular dose the evening before your procedure   Other Oral Diabetes Medicines Metformin®, Glucophage®, Glucophage XR®, Riomet®, Glumetza®), Actose®, Avandia®, Glyset®, Prandin® Take your regular dose with dinner in the evening before your procedure   GLP-1 Agonists AdlyxinÒ, ByettaÒ, BydureonÒ, OzempicÒ, SoliquaÒ, TanzeumÒ, TrulicityÒ, VictozaÒ, Saxenda®, Rybelsus® If taken daily, take as normal    If taken weekly, do not take this medicine for 7 days before your procedure including the day of the procedure (resume taking after the procedure)   SGLT-2 Inhibitors Jardiance®, Invokana®,  Farxiga®,   Steglatro®, Brenzavvy®, Qtern®, Segluromet®, Glyxambi®, Synjardy®, Synjardy XR®, Invokamet®, Invokamet XR®, Trijary XR®, Xigduo XR®, Steglujan® Do not take for 4 days before your procedure including the day of the procedure (resume taking after the procedure)                More information continued on back                    Medicine Instructions for Adults with Diabetes who Need a Bowel Prep  Page 2      On the Day of Surgery/Procedure  Follow the directions below based on the type of medicine you take for your diabetes.     Type of Medicine You Take Examples What to do   Long-Acting Insulin Lantus®, Levemir®, Tresiba®, Toujeo®, Basaglar®, Semglee®   If you usually take your Long-Acting Insulin in the morning, take the full dose as scheduled.   GLP-1 Agonists AdlyxinÒ, ByettaÒ, BydureonÒ, OzempicÒ, SoliquaÒ, TanzeumÒ, TrulicityÒ, VictozaÒ, Saxenda®, Rybelsus® Do NOT take this medicine on the day of your procedure (resume taking after the procedure)       On the Day of Surgery/Procedure (continued)  Except for the morning Long-Acting Insulin, DO NOT take ANY diabetic medicine on the day of your procedure unless you were instructed by the doctor who manages your diabetes medicines.    Continue to check your blood sugars.  If you have an insulin pump, ask your endocrinologist for instructions at least 3 days before your procedure. NOTE: If you are not able to ask your endocrinologist in advance, on the day of the procedure set your insulin pump to your basal rate only. Bring your insulin pump supplies to the hospital.     If you have any questions about taking your diabetes medicines prior to your procedure, please contact the doctor who manages your diabetes medicines.

## 2024-11-08 LAB
LAB AP GYN PRIMARY INTERPRETATION: NORMAL
Lab: NORMAL

## 2024-11-15 ENCOUNTER — HOSPITAL ENCOUNTER (OUTPATIENT)
Dept: RADIOLOGY | Facility: HOSPITAL | Age: 49
Discharge: HOME | End: 2024-11-15
Payer: COMMERCIAL

## 2024-11-15 ENCOUNTER — OFFICE VISIT (OUTPATIENT)
Dept: RHEUMATOLOGY | Facility: CLINIC | Age: 49
End: 2024-11-15

## 2024-11-15 VITALS
HEIGHT: 62 IN | WEIGHT: 124.1 LBS | OXYGEN SATURATION: 100 % | HEART RATE: 62 BPM | TEMPERATURE: 97.1 F | DIASTOLIC BLOOD PRESSURE: 70 MMHG | BODY MASS INDEX: 22.84 KG/M2 | SYSTOLIC BLOOD PRESSURE: 102 MMHG

## 2024-11-15 DIAGNOSIS — M25.50 HYPERMOBILITY ARTHRALGIA: Primary | ICD-10-CM

## 2024-11-15 DIAGNOSIS — M77.11 LATERAL EPICONDYLITIS OF RIGHT ELBOW: ICD-10-CM

## 2024-11-15 DIAGNOSIS — M25.50 HYPERMOBILITY ARTHRALGIA: ICD-10-CM

## 2024-11-15 PROCEDURE — 73562 X-RAY EXAM OF KNEE 3: CPT

## 2024-11-15 PROCEDURE — 73130 X-RAY EXAM OF HAND: CPT

## 2024-11-15 PROCEDURE — 73070 X-RAY EXAM OF ELBOW: CPT

## 2024-11-15 NOTE — PATIENT INSTRUCTIONS
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

## 2024-11-15 NOTE — PROGRESS NOTES
Name: Peace Noriega      : 1975      MRN: [id number]  Encounter Provider: Jose Salinas DO  Encounter Date: 11/15/2024   Encounter department: [facility]     Reason for Consultation: Diffuse joint pain:  Assessment & Plan  Arthralgia, unspecified joint  Given history of diffuse joint pain with associated history of hypermobility suspect component of hypermobility arthralgias given no morning stiffness low suspicious inflammatory arthritis.  Beighton score 7/9 support hypermobility arthralgias.  Can also consider component of OA especially with joints and hand symptoms.    Elbow symptoms suspect a component of epicondylitis/tennis elbow    Plan  X-rays hands, knee, elbow  No further rheumatologic workup recommended at this point in time  Recommend physical therapy/sports medicine  Can take NSAIDs if needed  If x-ray is unremarkable no suspicious of inflammation no further rheumatology evaluation recommended at this point in time.    Orders:    Ambulatory Referral to Rheumatology          Pertinent Medical History   Prediabetes, Migraines     History of Present Illness   HPI  Peace Noriega is a 49 y.o. female who presents for Rheumatologic consultation asked by Dr. Camarillo.  Has a history of diffuse joint pain has been ongoing over the past year that waxes and wanes more pronounced in the left knee and right elbow.  This is not associated with redness, swelling however there is pain and achiness.  Endorsed that on occasions can been associated with swelling and some morning stiffness however this only lasted for a few minutes.  Denies any traumas, falls.  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      Denies any dry eyes, dry mouth, redness or ulcerations in the eyes.  No odynophagia, dysphagia, hematochezia or hematuria.  Denies any Raynaud's symptoms.    Endorse prior history of right knee  ACL rupture that was treated conservatively.  There is a prior history of elbow injury/fracture which did had surgery however this was approximately at younger age.    Denies family history of rheumatologic disease although there is OA.  Is a lifetime non-smoker with seldom alcohol use.    Did had rheumatology workup ordered by PCP which showed negative VERENICE, Lyme, CRP and RF.  Review of Systems   Constitutional:  Negative for chills and fever.   HENT:  Negative for ear pain and sore throat.    Eyes:  Negative for pain and visual disturbance.   Respiratory:  Negative for cough and shortness of breath.    Cardiovascular:  Negative for chest pain and palpitations.   Gastrointestinal:  Negative for abdominal pain and vomiting.   Genitourinary:  Negative for dysuria and hematuria.   Musculoskeletal:  Positive for arthralgias. Negative for back pain and joint swelling.   Skin:  Negative for color change and rash.   Neurological:  Negative for seizures and syncope.   All other systems reviewed and are negative.          Objective   [facility] 11/11/2022 (Approximate)     Physical Exam  Vitals and nursing note reviewed.   Constitutional:       General: She is not in acute distress.     Appearance: She is well-developed.   HENT:      Head: Normocephalic and atraumatic.   Eyes:      Conjunctiva/sclera: Conjunctivae normal.   Cardiovascular:      Rate and Rhythm: Normal rate and regular rhythm.      Heart sounds: No murmur heard.  Pulmonary:      Effort: Pulmonary effort is normal. No respiratory distress.      Breath sounds: Normal breath sounds.   Abdominal:      Palpations: Abdomen is soft.      Tenderness: There is no abdominal tenderness.   Musculoskeletal:         General: No swelling.      Cervical back: Neck supple.      Right lower leg: No edema.      Left lower leg: No edema.   Skin:     General: Skin is warm and dry.      Capillary Refill: Capillary refill takes less than 2 seconds.   Neurological:      Mental Status: She  is alert.   Psychiatric:         Mood and Affect: Mood normal.       BEIGHTON SCORE    LEFT RIGHT   1. Passive dosriflexion and hyperextension of the fifth MCP joint beyond 90° 1/1 1/1   2. Passive apposition of the thumb to the flexor aspect of the forearm 1/1 1/1   3. Passive hyperextension of the elbow beyond 10° 1/1 0/1   4. Passive hyperextension of the knee beyond 10° 1/1 1/1   5. Active forward flexion of the trunk with the knees fully extended so that the palms of the hands rest flat on the floor 1/1     TOTAL 8/9      Joint Exam 11/15/2024     No joint exam has been documented for this visit        Recent labs:  Lab Results   Component Value Date/Time    SODIUM 137 05/17/2024 11:23 AM    K 4.8 05/17/2024 11:23 AM    BUN 15 05/17/2024 11:23 AM    CREATININE 0.80 05/17/2024 11:23 AM    GLUC 90 12/21/2016 08:39 AM    CALCIUM 9.8 05/17/2024 11:23 AM    AST 19 05/17/2024 11:23 AM    ALT 11 05/17/2024 11:23 AM    ALB 4.7 05/17/2024 11:23 AM    TP 7.6 05/17/2024 11:23 AM    EGFR 86 05/17/2024 11:23 AM     Lab Results   Component Value Date/Time    HGB 13.1 05/17/2024 11:23 AM    WBC 4.57 05/17/2024 11:23 AM     05/17/2024 11:23 AM     Lab Results   Component Value Date/Time    SHO7GPDMFLVI 1.709 05/17/2024 11:23 AM

## 2024-11-18 ENCOUNTER — RESULTS FOLLOW-UP (OUTPATIENT)
Dept: RHEUMATOLOGY | Facility: CLINIC | Age: 49
End: 2024-11-18

## 2024-12-05 ENCOUNTER — ANESTHESIA EVENT (OUTPATIENT)
Dept: ANESTHESIOLOGY | Facility: HOSPITAL | Age: 49
End: 2024-12-05

## 2024-12-05 ENCOUNTER — ANESTHESIA (OUTPATIENT)
Dept: ANESTHESIOLOGY | Facility: HOSPITAL | Age: 49
End: 2024-12-05

## 2024-12-11 ENCOUNTER — TELEPHONE (OUTPATIENT)
Age: 49
End: 2024-12-11

## 2024-12-11 NOTE — LETTER
Medicine Instructions for Adults with Diabetes who Need a Bowel Prep       Follow these instructions when a BOWEL PREP is required for your procedure or surgery!    NOTE:   GLP-1 Agonists taken weekly: do not take in the 7 days before your procedure   SGLT-2 Inhibitors: do not take in the 4 days before your procedure     On the Day Before Surgery/Procedure  If you are having a procedure (e.g. Colonoscopy) or surgery that requires a bowel prep and you may have at least a clear liquid diet, follow the directions below based on the type of medicine you take for your diabetes.     Type of Medicine You Take Examples What to do   Pre-Mixed Insulin - Intermediate Acting Humalog® 75/25, Humulin® 70/30, Novolog® 70/30, Regular Insulin Take ½ your regular dose the evening before your procedure   Rapid/Fast Acting Insulin Humalog® U200, NovoLog®, Apidra®, Fiasp® Take ½ your regular dose the evening before your procedure.   Long-Acting Insulin Lantus®, Levemir®, Tresiba®, Toujeo®, Basaglar® Take your FULL regular dose the day before procedure   Oral Sulfonylurea Glipizide/Glimepiride/Glucotrol® Take ½ your regular dose the evening before your procedure   Other Oral Diabetes Medicines Metformin®, Glucophage®, Glucophage XR®, Riomet®, Glumetza®), Actose®, Avandia®, Glyset®, Prandin® Take your regular dose with dinner in the evening before your procedure   GLP-1 Agonists AdlyxinÒ, ByettaÒ, BydureonÒ, OzempicÒ, SoliquaÒ, TanzeumÒ, TrulicityÒ, VictozaÒ, Saxenda®, Rybelsus® If taken daily, take as normal    If taken weekly, do not take this medicine for 7 days before your procedure including the day of the procedure (resume taking after the procedure)   SGLT-2 Inhibitors Jardiance®, Invokana®, Farxiga®,   Steglatro®, Brenzavvy®, Qtern®, Segluromet®, Glyxambi®, Synjardy®, Synjardy XR®, Invokamet®, Invokamet XR®, Trijary XR®, Xigduo XR®, Steglujan® Do not take for 4 days before your procedure including the day of the  procedure (resume taking after the procedure)                More information continued on back                    Medicine Instructions for Adults with Diabetes who Need a Bowel Prep  Page 2      On the Day of Surgery/Procedure  Follow the directions below based on the type of medicine you take for your diabetes.     Type of Medicine You Take Examples What to do   Long-Acting Insulin Lantus®, Levemir®, Tresiba®, Toujeo®, Basaglar®, Semglee®   If you usually take your Long-Acting Insulin in the morning, take the full dose as scheduled.   GLP-1 Agonists AdlyxinÒ, ByettaÒ, BydureonÒ, OzempicÒ, SoliquaÒ, TanzeumÒ, TrulicityÒ, VictozaÒ, Saxenda®, Rybelsus® Do NOT take this medicine on the day of your procedure (resume taking after the procedure)       On the Day of Surgery/Procedure (continued)  Except for the morning Long-Acting Insulin, DO NOT take ANY diabetic medicine on the day of your procedure unless you were instructed by the doctor who manages your diabetes medicines.    Continue to check your blood sugars.  If you have an insulin pump, ask your endocrinologist for instructions at least 3 days before your procedure. NOTE: If you are not able to ask your endocrinologist in advance, on the day of the procedure set your insulin pump to your basal rate only. Bring your insulin pump supplies to the hospital.     If you have any questions about taking your diabetes medicines prior to your procedure, please contact the doctor who manages your diabetes medicines.

## 2024-12-19 ENCOUNTER — HOSPITAL ENCOUNTER (OUTPATIENT)
Dept: GASTROENTEROLOGY | Facility: AMBULARY SURGERY CENTER | Age: 49
Setting detail: OUTPATIENT SURGERY
End: 2024-12-19
Attending: INTERNAL MEDICINE
Payer: COMMERCIAL

## 2024-12-19 ENCOUNTER — ANESTHESIA (OUTPATIENT)
Dept: GASTROENTEROLOGY | Facility: AMBULARY SURGERY CENTER | Age: 49
End: 2024-12-19
Payer: COMMERCIAL

## 2024-12-19 VITALS
HEIGHT: 62 IN | SYSTOLIC BLOOD PRESSURE: 96 MMHG | DIASTOLIC BLOOD PRESSURE: 72 MMHG | BODY MASS INDEX: 22.08 KG/M2 | OXYGEN SATURATION: 100 % | HEART RATE: 60 BPM | WEIGHT: 120 LBS | RESPIRATION RATE: 22 BRPM | TEMPERATURE: 97.7 F

## 2024-12-19 DIAGNOSIS — Z12.11 SCREENING FOR COLON CANCER: ICD-10-CM

## 2024-12-19 PROCEDURE — G0121 COLON CA SCRN NOT HI RSK IND: HCPCS | Performed by: INTERNAL MEDICINE

## 2024-12-19 RX ORDER — LIDOCAINE HYDROCHLORIDE 10 MG/ML
INJECTION, SOLUTION EPIDURAL; INFILTRATION; INTRACAUDAL; PERINEURAL AS NEEDED
Status: DISCONTINUED | OUTPATIENT
Start: 2024-12-19 | End: 2024-12-19

## 2024-12-19 RX ORDER — PROPOFOL 10 MG/ML
INJECTION, EMULSION INTRAVENOUS AS NEEDED
Status: DISCONTINUED | OUTPATIENT
Start: 2024-12-19 | End: 2024-12-19

## 2024-12-19 RX ORDER — PROPOFOL 10 MG/ML
INJECTION, EMULSION INTRAVENOUS CONTINUOUS PRN
Status: DISCONTINUED | OUTPATIENT
Start: 2024-12-19 | End: 2024-12-19

## 2024-12-19 RX ORDER — SODIUM CHLORIDE 9 MG/ML
INJECTION, SOLUTION INTRAVENOUS CONTINUOUS PRN
Status: DISCONTINUED | OUTPATIENT
Start: 2024-12-19 | End: 2024-12-19

## 2024-12-19 RX ADMIN — PROPOFOL 120 MG: 10 INJECTION, EMULSION INTRAVENOUS at 12:02

## 2024-12-19 RX ADMIN — PROPOFOL 110 MCG/KG/MIN: 10 INJECTION, EMULSION INTRAVENOUS at 12:02

## 2024-12-19 RX ADMIN — PROPOFOL 30 MG: 10 INJECTION, EMULSION INTRAVENOUS at 12:14

## 2024-12-19 RX ADMIN — LIDOCAINE HYDROCHLORIDE 50 MG: 10 INJECTION, SOLUTION EPIDURAL; INFILTRATION; INTRACAUDAL at 12:02

## 2024-12-19 RX ADMIN — SODIUM CHLORIDE: 0.9 INJECTION, SOLUTION INTRAVENOUS at 11:56

## 2024-12-19 NOTE — ANESTHESIA PREPROCEDURE EVALUATION
Procedure:  COLONOSCOPY    Relevant Problems   CARDIO   (+) Migraine without aura and without status migrainosus, not intractable   (+) Pure hypercholesterolemia      NEURO/PSYCH   (+) Migraine without aura and without status migrainosus, not intractable   (+) Situational anxiety   (+) Trigeminal autonomic cephalgias        Physical Exam    Airway    Mallampati score: I  TM Distance: >3 FB  Neck ROM: full     Dental   No notable dental hx     Cardiovascular      Pulmonary      Other Findings  post-pubertal.      Anesthesia Plan  ASA Score- 1     Anesthesia Type- IV sedation with anesthesia with ASA Monitors.         Additional Monitors:     Airway Plan:            Plan Factors-Exercise tolerance (METS): >4 METS.    Chart reviewed.   Existing labs reviewed. Patient summary reviewed.                  Induction- intravenous.    Postoperative Plan-         Informed Consent- Anesthetic plan and risks discussed with patient.  I personally reviewed this patient with the CRNA. Discussed and agreed on the Anesthesia Plan with the CRNA..

## 2024-12-19 NOTE — H&P
History and Physical -  [facility]  Peace Noriega 49 y.o. female MRN: [id number]                  HPI: Peace Noriega is a 49 y.o. year old female who presents for screening      REVIEW OF SYSTEMS: Per the HPI, and otherwise unremarkable.    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     Bronchitis     HPV in female     Hypercholesterolemia     200/75/119    Menstrual migraine     Migraines      Past Surgical History:   Procedure Laterality Date    ACHILLES TENDON REPAIR Left     Repair of Ruptured achilles tendon left    ACHILLES TENDON REPAIR Left     [age]    ELBOW ARTHROPLASTY Left     ELBOW SURGERY      teen    WISDOM TOOTH EXTRACTION       Social History   Social History     Substance and Sexual Activity   Alcohol Use Yes    Comment: socially beer and wine     Social History     Substance and Sexual Activity   Drug Use No     Social History     Tobacco Use   Smoking Status Never   Smokeless Tobacco Never     Family History   Problem Relation Age of Onset    Hyperlipidemia Mother         Pure hypocholesterolemia    Melanoma Father 75    Aortic aneurysm Father     Hypertension Father     Osteoarthritis Father     No Known Problems Sister     No Known Problems Daughter     Cancer Maternal Grandfather         unknown type or dx age    Hypertension Paternal Grandmother     Parkinsonism Paternal Grandfather     Hypertension Paternal Grandfather     No Known Problems Maternal Aunt     Leukemia Cousin 20    Breast cancer Neg Hx     Ovarian cancer Neg Hx        Meds/Allergies       Current Outpatient Medications:     acetaminophen (TYLENOL) 325 mg tablet    cetirizine (ZyrTEC) 10 MG chewable tablet    fluticasone (FLONASE) 50 mcg/act nasal spray    sertraline (ZOLOFT) 50 mg tablet    SUMAtriptan (IMITREX) 50 mg tablet    albuterol (PROVENTIL HFA,VENTOLIN HFA) 90 mcg/act inhaler    " ALPRAZolam (XANAX) 0.25 mg tablet    No Known Allergies    Objective     /71   Pulse 66   Temp (!) 97.2 °F (36.2 °C) (Temporal)   Resp 18   Ht 5' 2\" (1.575 m)   Wt 54.4 kg (120 lb)   LMP 11/11/2022 (Approximate)   SpO2 100%   BMI 21.95 kg/m²       PHYSICAL EXAM    Gen: NAD  Head: NCAT  CV: RRR  CHEST: Clear  ABD: soft, NT/ND  EXT: no edema      ASSESSMENT/PLAN:  This is a 49 y.o. year old female here for colonoscopy, and she is stable and optimized for her procedure.        "

## 2024-12-19 NOTE — ANESTHESIA POSTPROCEDURE EVALUATION
Post-Op Assessment Note    CV Status:  Stable  Pain Score: 0    Pain management: adequate       Mental Status:  Alert and awake   Hydration Status:  Euvolemic   PONV Controlled:  Controlled   Airway Patency:  Patent     Post Op Vitals Reviewed: Yes    No anethesia notable event occurred.    Staff: CRNA           Last Filed PACU Vitals:  Vitals Value Taken Time   Temp 97.7    Pulse 76    BP 92/55    Resp 16    SpO2 99% RA        Modified Wyatt:  No data recorded

## 2025-02-24 ENCOUNTER — EVALUATION (OUTPATIENT)
Dept: PHYSICAL THERAPY | Facility: OTHER | Age: 50
End: 2025-02-24
Payer: COMMERCIAL

## 2025-02-24 DIAGNOSIS — M77.42 METATARSALGIA OF BOTH FEET: Primary | ICD-10-CM

## 2025-02-24 DIAGNOSIS — M77.41 METATARSALGIA OF BOTH FEET: Primary | ICD-10-CM

## 2025-02-24 PROCEDURE — 97161 PT EVAL LOW COMPLEX 20 MIN: CPT | Performed by: PHYSICAL THERAPIST

## 2025-02-24 PROCEDURE — 97760 ORTHOTIC MGMT&TRAING 1ST ENC: CPT | Performed by: PHYSICAL THERAPIST

## 2025-02-24 NOTE — PROGRESS NOTES
Orthotic Evaluation    Today's date: 25  Patient name: Peace Noriega  : 1975  MRN: [id number]  Referring provider: Beckie Ga PT  Dx:   Encounter Diagnosis     ICD-10-CM    1. Metatarsalgia of both feet  M77.41     M77.42           Start Time: 0700  Stop Time: 07  Total time in clinic (min): 33 minutes     Subjective:    Peace presents today for orthotic evaluation. she complains of pain with functional activities including ambulation and leisure. The patient plans to use her orthotic for walking, standing, and work. The orthotic will be placed in a wide, size 8.5 adrenaline or revel sneaker. Patient reports she has responded well to a lower heel to toe drop versus the higher heel to toe drop in the glycerin. She also notes some back discomfort with prolonged standing at work in orthotics. But, does note when she is in her custom orthotics her met heads are pain free. She does occasionally switch to superfeet due to back pain and heel pain but notes her met heads feel worse in super feet over the counter device.     Objective:    Age: 49 y.o.  Weight: 120        Objective     General Comments:      Ankle/Foot Comments   Orthotics are difficult to fit in current shoe due to width. They also demonstrate an extra deep heel cup and little rearfoot padding  She also demonstrates mild excessive hang of fifth met and soft tissue bulge at talonavicular joint     Metatarsal and soft tissue overhang is increased on superfeet orthotics.       Assessment:    Patient requires custom foot orthosis with medial post and metbar to correct altered gait mechanics.Her current pair are well worn with notable wear of heel counter padding. We discussed changing design to more of a street cut, although this design will be more narrow and less supportive it will transition easier in and out of shoes. As patient does not have a wardrobe of wide shoes. Patient was educated on the design of the custom orthotic  and it's ability to offload her current pathology. Patient was also educated on potential shoe wear changes with device, break-in period, and skin checks to avoid potential skin break down.     Orthotic goals:  1) Patient will have custom foot orthoses fitted to her shoe.   2) Patient will be compliant with break-in period of custom foot orthoses as prescribed by PT.   3) Patient will be compliant with custom foot orthoses use as prescribed by PT.     Plan:    Planned therapy interventions: orthotic fitting/training  Duration in visits: 2

## 2025-03-26 ENCOUNTER — APPOINTMENT (OUTPATIENT)
Dept: PHYSICAL THERAPY | Facility: OTHER | Age: 50
End: 2025-03-26
Payer: COMMERCIAL

## 2025-03-26 ENCOUNTER — OFFICE VISIT (OUTPATIENT)
Dept: PHYSICAL THERAPY | Facility: OTHER | Age: 50
End: 2025-03-26
Payer: COMMERCIAL

## 2025-03-26 DIAGNOSIS — M77.42 METATARSALGIA OF BOTH FEET: Primary | ICD-10-CM

## 2025-03-26 DIAGNOSIS — M77.41 METATARSALGIA OF BOTH FEET: Primary | ICD-10-CM

## 2025-03-26 PROCEDURE — L3010 FOOT LONGITUDINAL ARCH SUPPO: HCPCS | Performed by: PHYSICAL THERAPIST

## 2025-05-09 ENCOUNTER — OFFICE VISIT (OUTPATIENT)
Dept: INTERNAL MEDICINE CLINIC | Facility: CLINIC | Age: 50
End: 2025-05-09
Payer: COMMERCIAL

## 2025-05-09 VITALS
HEART RATE: 70 BPM | HEIGHT: 62 IN | OXYGEN SATURATION: 100 % | TEMPERATURE: 97.9 F | SYSTOLIC BLOOD PRESSURE: 110 MMHG | WEIGHT: 126 LBS | BODY MASS INDEX: 23.19 KG/M2 | DIASTOLIC BLOOD PRESSURE: 74 MMHG | RESPIRATION RATE: 14 BRPM

## 2025-05-09 DIAGNOSIS — K59.00 CONSTIPATION, UNSPECIFIED CONSTIPATION TYPE: ICD-10-CM

## 2025-05-09 DIAGNOSIS — F41.8 SITUATIONAL ANXIETY: Primary | ICD-10-CM

## 2025-05-09 DIAGNOSIS — E78.00 PURE HYPERCHOLESTEROLEMIA: ICD-10-CM

## 2025-05-09 DIAGNOSIS — R73.03 PREDIABETES: ICD-10-CM

## 2025-05-09 DIAGNOSIS — E53.8 VITAMIN B12 DEFICIENCY: ICD-10-CM

## 2025-05-09 DIAGNOSIS — Z00.00 LABORATORY EXAMINATION ORDERED AS PART OF A ROUTINE GENERAL MEDICAL EXAMINATION: ICD-10-CM

## 2025-05-09 DIAGNOSIS — G43.009 MIGRAINE WITHOUT AURA AND WITHOUT STATUS MIGRAINOSUS, NOT INTRACTABLE: ICD-10-CM

## 2025-05-09 DIAGNOSIS — M25.50 ARTHRALGIA, UNSPECIFIED JOINT: ICD-10-CM

## 2025-05-09 DIAGNOSIS — E55.9 VITAMIN D DEFICIENCY: ICD-10-CM

## 2025-05-09 DIAGNOSIS — N95.1 MENOPAUSAL STATE: ICD-10-CM

## 2025-05-09 DIAGNOSIS — J30.89 ALLERGIC RHINITIS DUE TO OTHER ALLERGIC TRIGGER, UNSPECIFIED SEASONALITY: ICD-10-CM

## 2025-05-09 DIAGNOSIS — Z00.00 ANNUAL PHYSICAL EXAM: ICD-10-CM

## 2025-05-09 PROCEDURE — 99396 PREV VISIT EST AGE 40-64: CPT | Performed by: INTERNAL MEDICINE

## 2025-05-09 PROCEDURE — 99214 OFFICE O/P EST MOD 30 MIN: CPT | Performed by: INTERNAL MEDICINE

## 2025-05-09 RX ORDER — GABAPENTIN 100 MG/1
100 CAPSULE ORAL 3 TIMES DAILY PRN
Qty: 60 CAPSULE | Refills: 0 | Status: SHIPPED | OUTPATIENT
Start: 2025-05-09

## 2025-05-09 RX ORDER — FLUTICASONE PROPIONATE 50 MCG
1 SPRAY, SUSPENSION (ML) NASAL DAILY
Qty: 16 ML | Refills: 5 | Status: SHIPPED | OUTPATIENT
Start: 2025-05-09

## 2025-05-09 RX ORDER — SUMATRIPTAN 50 MG/1
50 TABLET, FILM COATED ORAL AS NEEDED
Qty: 9 TABLET | Refills: 2 | Status: SHIPPED | OUTPATIENT
Start: 2025-05-09

## 2025-05-09 NOTE — PROGRESS NOTES
Name: Peace Noriega      : 1975      MRN: [id number]  Encounter Provider: Shauna Camarillo MD  Encounter Date: 2025   Encounter department: [facility] INTERNAL MEDICINE  :  Assessment & Plan  Situational anxiety  Stable, off sertraline.  Previously saw therapist.       Migraine without aura and without status migrainosus, not intractable  Takes Imitrex a few times a month. Instructed to start headache diary, briefly discussed daily prophylactic medication.  Recommend to start Mg, B2.  Orders:  •  SUMAtriptan (IMITREX) 50 mg tablet; Take 1 tablet (50 mg total) by mouth as needed for migraine May repeat in 2 hours    Arthralgia, unspecified joint  Saw Rheum, with hypermobility.  May have component of inflammatory arthritis, fibromyalgia. Discussed trial of gabapentin to take qHS or prn.  Limit NSAID use.  Orders:  •  gabapentin (Neurontin) 100 mg capsule; Take 1 capsule (100 mg total) by mouth 3 (three) times a day as needed (pain)    Prediabetes  Previous A1c 5.7%.  Orders:  •  Hemoglobin A1C; Future    Pure hypercholesterolemia  Repeat lipids.  Low risk, not on medication.  Orders:  •  Comprehensive metabolic panel; Future  •  Lipid panel; Future  •  TSH, 3rd generation with Free T4 reflex; Future    Allergic rhinitis due to other allergic trigger, unspecified seasonality  Taking antihistamine, using Flonase daily.  Orders:  •  fluticasone (FLONASE) 50 mcg/act nasal spray; 1 spray into each nostril daily    Vitamin B12 deficiency  Not taking B12.  Orders:  •  CBC and differential; Future  •  Vitamin B12; Future    Vitamin D deficiency  Not taking D3.  Orders:  •  Vitamin D 25 hydroxy; Future    Constipation, unspecified constipation type  Reviewed high-fiber diet.       Menopausal state  Briefly discussed symptoms.       Annual physical exam  Eye exam due.       Laboratory examination ordered as part of a routine general medical examination    Orders:  •  CBC and differential;  Future  •  Comprehensive metabolic panel; Future  •  Lipid panel; Future  •  TSH, 3rd generation with Free T4 reflex; Future  •  Vitamin B12; Future  •  Vitamin D 25 hydroxy; Future  •  Hemoglobin A1C; Future    Follow up in 1 year or as needed.       History of Present Illness   She reports ongoing joint pains.  She reports pains are worse with the cold weather, with snow or rain.  She takes Advil as needed only, about 2 a week.  She did see rheumatology, thought to have osteoarthritis.  She did have some x-rays done.  She reports episodes sometimes in the year which would last for a few days, she would experience hand swelling for about an hour in the morning and disappear afterwards.  This is not consistent.  She is asking if symptoms related to menopause.    She stopped taking Zoloft a few months ago, did not need to wean off.  She used to see her therapist regularly but she retired.  She feels she is doing much better, no issues with sleep.    She takes Imitrex a few times a month.  She is not sure what triggers it.  She did see neurology in the past.      Review of Systems   Constitutional:  Negative for appetite change and fatigue.   HENT:  Negative for congestion, ear pain and postnasal drip.    Eyes:  Negative for visual disturbance.   Respiratory:  Negative for cough and shortness of breath.    Cardiovascular:  Negative for chest pain and leg swelling.   Gastrointestinal:  Negative for abdominal pain, constipation and diarrhea.   Genitourinary:  Negative for dysuria, frequency and urgency.   Musculoskeletal:  Positive for arthralgias. Negative for myalgias, neck pain and neck stiffness.   Skin:  Negative for rash and wound.   Neurological:  Negative for dizziness, numbness and headaches.   Hematological:  Does not bruise/bleed easily.   Psychiatric/Behavioral:  Negative for confusion. The patient is not nervous/anxious.        Objective   /74 (BP Location: Left arm, Patient Position: Sitting, Cuff  "Size: Standard)   Pulse 70   Temp 97.9 °F (36.6 °C)   Resp 14   Ht 5' 2\" (1.575 m)   Wt 57.2 kg (126 lb)   LMP 11/11/2022 (Approximate)   SpO2 100%   BMI 23.05 kg/m²      Physical Exam  Vitals and nursing note reviewed.   Constitutional:       General: She is not in acute distress.     Appearance: She is well-developed.   HENT:      Head: Normocephalic and atraumatic.      Right Ear: Tympanic membrane, ear canal and external ear normal.      Left Ear: Tympanic membrane, ear canal and external ear normal.      Nose: Nose normal.      Mouth/Throat:      Mouth: Mucous membranes are moist.   Eyes:      Pupils: Pupils are equal, round, and reactive to light.   Cardiovascular:      Rate and Rhythm: Normal rate and regular rhythm.      Heart sounds: Normal heart sounds.   Pulmonary:      Effort: Pulmonary effort is normal.      Breath sounds: Normal breath sounds. No wheezing.   Abdominal:      General: Bowel sounds are normal.      Palpations: Abdomen is soft.   Musculoskeletal:         General: No swelling.      Right lower leg: No edema.      Left lower leg: No edema.   Skin:     General: Skin is warm.      Findings: No rash.   Neurological:      General: No focal deficit present.      Mental Status: She is alert and oriented to person, place, and time.   Psychiatric:         Mood and Affect: Mood and affect normal. Mood is not anxious or depressed.         Behavior: Behavior normal.           Labs & imaging results reviewed with patient.    "

## 2025-05-09 NOTE — ASSESSMENT & PLAN NOTE
Repeat lipids.  Low risk, not on medication.  Orders:  •  Comprehensive metabolic panel; Future  •  Lipid panel; Future  •  TSH, 3rd generation with Free T4 reflex; Future

## 2025-05-09 NOTE — ASSESSMENT & PLAN NOTE
Takes Imitrex a few times a month. Instructed to start headache diary, briefly discussed daily prophylactic medication.  Recommend to start Mg, B2.  Orders:  •  SUMAtriptan (IMITREX) 50 mg tablet; Take 1 tablet (50 mg total) by mouth as needed for migraine May repeat in 2 hours

## 2025-05-09 NOTE — PATIENT INSTRUCTIONS
Start headache diary.    Start magnesium oxide 200 mg twice a day.  Start vitamin B2 200 mg twice a day.  Start coenzyme Q10 150 mg twice a day.

## 2025-05-09 NOTE — ASSESSMENT & PLAN NOTE
Taking antihistamine, using Flonase daily.  Orders:  •  fluticasone (FLONASE) 50 mcg/act nasal spray; 1 spray into each nostril daily

## 2025-05-09 NOTE — ASSESSMENT & PLAN NOTE
Saw Rheum, with hypermobility.  May have component of inflammatory arthritis, fibromyalgia. Discussed trial of gabapentin to take qHS or prn.  Limit NSAID use.  Orders:  •  gabapentin (Neurontin) 100 mg capsule; Take 1 capsule (100 mg total) by mouth 3 (three) times a day as needed (pain)

## 2025-05-17 DIAGNOSIS — G43.009 MIGRAINE WITHOUT AURA AND WITHOUT STATUS MIGRAINOSUS, NOT INTRACTABLE: ICD-10-CM

## 2025-05-17 DIAGNOSIS — J30.89 ALLERGIC RHINITIS DUE TO OTHER ALLERGIC TRIGGER, UNSPECIFIED SEASONALITY: ICD-10-CM

## 2025-05-18 RX ORDER — CETIRIZINE HYDROCHLORIDE 10 MG/1
10 TABLET, CHEWABLE ORAL DAILY
Refills: 0 | OUTPATIENT
Start: 2025-05-18

## 2025-05-18 RX ORDER — FLUTICASONE PROPIONATE 50 MCG
1 SPRAY, SUSPENSION (ML) NASAL DAILY
Qty: 16 ML | Refills: 0 | OUTPATIENT
Start: 2025-05-18

## 2025-05-18 RX ORDER — SUMATRIPTAN 50 MG/1
50 TABLET, FILM COATED ORAL AS NEEDED
Qty: 9 TABLET | Refills: 0 | OUTPATIENT
Start: 2025-05-18

## 2025-05-28 ENCOUNTER — APPOINTMENT (OUTPATIENT)
Dept: LAB | Facility: CLINIC | Age: 50
End: 2025-05-28
Payer: COMMERCIAL

## 2025-05-28 DIAGNOSIS — E78.00 PURE HYPERCHOLESTEROLEMIA: ICD-10-CM

## 2025-05-28 DIAGNOSIS — R73.03 PREDIABETES: ICD-10-CM

## 2025-05-28 DIAGNOSIS — E53.8 VITAMIN B12 DEFICIENCY: ICD-10-CM

## 2025-05-28 DIAGNOSIS — E55.9 VITAMIN D DEFICIENCY: ICD-10-CM

## 2025-05-28 DIAGNOSIS — Z00.00 LABORATORY EXAMINATION ORDERED AS PART OF A ROUTINE GENERAL MEDICAL EXAMINATION: ICD-10-CM

## 2025-05-28 LAB
25(OH)D3 SERPL-MCNC: 32.2 NG/ML (ref 30–100)
ALBUMIN SERPL BCG-MCNC: 4.3 G/DL (ref 3.5–5)
ALP SERPL-CCNC: 49 U/L (ref 34–104)
ALT SERPL W P-5'-P-CCNC: 10 U/L (ref 7–52)
ANION GAP SERPL CALCULATED.3IONS-SCNC: 8 MMOL/L (ref 4–13)
AST SERPL W P-5'-P-CCNC: 17 U/L (ref 13–39)
BASOPHILS # BLD AUTO: 0.02 THOUSANDS/ÂΜL (ref 0–0.1)
BASOPHILS NFR BLD AUTO: 1 % (ref 0–1)
BILIRUB SERPL-MCNC: 0.52 MG/DL (ref 0.2–1)
BUN SERPL-MCNC: 13 MG/DL (ref 5–25)
CALCIUM SERPL-MCNC: 9.4 MG/DL (ref 8.4–10.2)
CHLORIDE SERPL-SCNC: 104 MMOL/L (ref 96–108)
CHOLEST SERPL-MCNC: 194 MG/DL (ref ?–200)
CO2 SERPL-SCNC: 29 MMOL/L (ref 21–32)
CREAT SERPL-MCNC: 0.79 MG/DL (ref 0.6–1.3)
EOSINOPHIL # BLD AUTO: 0.13 THOUSAND/ÂΜL (ref 0–0.61)
EOSINOPHIL NFR BLD AUTO: 3 % (ref 0–6)
ERYTHROCYTE [DISTWIDTH] IN BLOOD BY AUTOMATED COUNT: 13.2 % (ref 11.6–15.1)
EST. AVERAGE GLUCOSE BLD GHB EST-MCNC: 117 MG/DL
GFR SERPL CREATININE-BSD FRML MDRD: 87 ML/MIN/1.73SQ M
GLUCOSE P FAST SERPL-MCNC: 82 MG/DL (ref 65–99)
HBA1C MFR BLD: 5.7 %
HCT VFR BLD AUTO: 37.8 % (ref 34.8–46.1)
HDLC SERPL-MCNC: 89 MG/DL
HGB BLD-MCNC: 12 G/DL (ref 11.5–15.4)
IMM GRANULOCYTES # BLD AUTO: 0.01 THOUSAND/UL (ref 0–0.2)
IMM GRANULOCYTES NFR BLD AUTO: 0 % (ref 0–2)
LDLC SERPL CALC-MCNC: 96 MG/DL (ref 0–100)
LYMPHOCYTES # BLD AUTO: 1.65 THOUSANDS/ÂΜL (ref 0.6–4.47)
LYMPHOCYTES NFR BLD AUTO: 43 % (ref 14–44)
MCH RBC QN AUTO: 30.9 PG (ref 26.8–34.3)
MCHC RBC AUTO-ENTMCNC: 31.7 G/DL (ref 31.4–37.4)
MCV RBC AUTO: 97 FL (ref 82–98)
MONOCYTES # BLD AUTO: 0.35 THOUSAND/ÂΜL (ref 0.17–1.22)
MONOCYTES NFR BLD AUTO: 9 % (ref 4–12)
NEUTROPHILS # BLD AUTO: 1.68 THOUSANDS/ÂΜL (ref 1.85–7.62)
NEUTS SEG NFR BLD AUTO: 44 % (ref 43–75)
NONHDLC SERPL-MCNC: 105 MG/DL
NRBC BLD AUTO-RTO: 0 /100 WBCS
PLATELET # BLD AUTO: 213 THOUSANDS/UL (ref 149–390)
PMV BLD AUTO: 10.5 FL (ref 8.9–12.7)
POTASSIUM SERPL-SCNC: 4.3 MMOL/L (ref 3.5–5.3)
PROT SERPL-MCNC: 6.7 G/DL (ref 6.4–8.4)
RBC # BLD AUTO: 3.88 MILLION/UL (ref 3.81–5.12)
SODIUM SERPL-SCNC: 141 MMOL/L (ref 135–147)
TRIGL SERPL-MCNC: 47 MG/DL (ref ?–150)
TSH SERPL DL<=0.05 MIU/L-ACNC: 3.46 UIU/ML (ref 0.45–4.5)
VIT B12 SERPL-MCNC: 209 PG/ML (ref 180–914)
WBC # BLD AUTO: 3.84 THOUSAND/UL (ref 4.31–10.16)

## 2025-05-28 PROCEDURE — 82607 VITAMIN B-12: CPT

## 2025-05-28 PROCEDURE — 80061 LIPID PANEL: CPT

## 2025-05-28 PROCEDURE — 82306 VITAMIN D 25 HYDROXY: CPT

## 2025-05-28 PROCEDURE — 84443 ASSAY THYROID STIM HORMONE: CPT

## 2025-05-28 PROCEDURE — 83036 HEMOGLOBIN GLYCOSYLATED A1C: CPT

## 2025-05-28 PROCEDURE — 80053 COMPREHEN METABOLIC PANEL: CPT

## 2025-05-28 PROCEDURE — 85025 COMPLETE CBC W/AUTO DIFF WBC: CPT

## 2025-05-28 PROCEDURE — 36415 COLL VENOUS BLD VENIPUNCTURE: CPT

## 2025-06-02 ENCOUNTER — RESULTS FOLLOW-UP (OUTPATIENT)
Dept: INTERNAL MEDICINE CLINIC | Facility: CLINIC | Age: 50
End: 2025-06-02

## 2025-07-23 ENCOUNTER — APPOINTMENT (OUTPATIENT)
Dept: URGENT CARE | Age: 50
End: 2025-07-23
Payer: OTHER MISCELLANEOUS

## 2025-07-23 PROCEDURE — 99283 EMERGENCY DEPT VISIT LOW MDM: CPT | Performed by: STUDENT IN AN ORGANIZED HEALTH CARE EDUCATION/TRAINING PROGRAM

## 2025-07-23 PROCEDURE — 90471 IMMUNIZATION ADMIN: CPT | Performed by: NURSE PRACTITIONER

## 2025-07-23 PROCEDURE — G0382 LEV 3 HOSP TYPE B ED VISIT: HCPCS | Performed by: STUDENT IN AN ORGANIZED HEALTH CARE EDUCATION/TRAINING PROGRAM

## 2025-07-23 PROCEDURE — 90715 TDAP VACCINE 7 YRS/> IM: CPT
